# Patient Record
Sex: FEMALE | Race: WHITE | Employment: OTHER | URBAN - METROPOLITAN AREA
[De-identification: names, ages, dates, MRNs, and addresses within clinical notes are randomized per-mention and may not be internally consistent; named-entity substitution may affect disease eponyms.]

---

## 2017-07-11 ENCOUNTER — HOSPITAL ENCOUNTER (EMERGENCY)
Age: 72
Discharge: HOME | End: 2017-07-11
Attending: EMERGENCY MEDICINE
Payer: MEDICARE

## 2017-07-11 VITALS
BODY MASS INDEX: 30.15 KG/M2 | OXYGEN SATURATION: 99 % | TEMPERATURE: 98.4 F | RESPIRATION RATE: 19 BRPM | HEIGHT: 64 IN | HEART RATE: 53 BPM | DIASTOLIC BLOOD PRESSURE: 74 MMHG | WEIGHT: 176.59 LBS | SYSTOLIC BLOOD PRESSURE: 139 MMHG

## 2017-07-11 DIAGNOSIS — R10.9 ABDOMINAL PAIN, UNSPECIFIED LOCATION: Primary | ICD-10-CM

## 2017-07-11 DIAGNOSIS — R55 NEAR SYNCOPE: ICD-10-CM

## 2017-07-11 LAB
ALBUMIN SERPL BCP-MCNC: 3.8 G/DL (ref 3.5–5)
ALBUMIN/GLOB SERPL: 1.2 {RATIO} (ref 1.1–2.2)
ALP SERPL-CCNC: 103 U/L (ref 45–117)
ALT SERPL-CCNC: 19 U/L (ref 12–78)
ANION GAP BLD CALC-SCNC: 8 MMOL/L (ref 5–15)
APPEARANCE UR: CLEAR
AST SERPL W P-5'-P-CCNC: 15 U/L (ref 15–37)
BACTERIA URNS QL MICRO: NEGATIVE /HPF
BASOPHILS # BLD AUTO: 0 K/UL (ref 0–0.1)
BASOPHILS # BLD: 0 % (ref 0–1)
BILIRUB SERPL-MCNC: 0.4 MG/DL (ref 0.2–1)
BILIRUB UR QL: NEGATIVE
BUN SERPL-MCNC: 14 MG/DL (ref 6–20)
BUN/CREAT SERPL: 16 (ref 12–20)
CALCIUM SERPL-MCNC: 9 MG/DL (ref 8.5–10.1)
CHLORIDE SERPL-SCNC: 105 MMOL/L (ref 97–108)
CO2 SERPL-SCNC: 25 MMOL/L (ref 21–32)
COLOR UR: ABNORMAL
CREAT SERPL-MCNC: 0.86 MG/DL (ref 0.55–1.02)
EOSINOPHIL # BLD: 0 K/UL (ref 0–0.4)
EOSINOPHIL NFR BLD: 0 % (ref 0–7)
EPITH CASTS URNS QL MICRO: ABNORMAL /LPF
ERYTHROCYTE [DISTWIDTH] IN BLOOD BY AUTOMATED COUNT: 13.8 % (ref 11.5–14.5)
GLOBULIN SER CALC-MCNC: 3.2 G/DL (ref 2–4)
GLUCOSE SERPL-MCNC: 102 MG/DL (ref 65–100)
GLUCOSE UR STRIP.AUTO-MCNC: NEGATIVE MG/DL
HCT VFR BLD AUTO: 39.3 % (ref 35–47)
HGB BLD-MCNC: 13.1 G/DL (ref 11.5–16)
HGB UR QL STRIP: NEGATIVE
HYALINE CASTS URNS QL MICRO: ABNORMAL /LPF (ref 0–5)
KETONES UR QL STRIP.AUTO: NEGATIVE MG/DL
LEUKOCYTE ESTERASE UR QL STRIP.AUTO: ABNORMAL
LYMPHOCYTES # BLD AUTO: 28 % (ref 12–49)
LYMPHOCYTES # BLD: 2 K/UL (ref 0.8–3.5)
MCH RBC QN AUTO: 29.7 PG (ref 26–34)
MCHC RBC AUTO-ENTMCNC: 33.3 G/DL (ref 30–36.5)
MCV RBC AUTO: 89.1 FL (ref 80–99)
MONOCYTES # BLD: 0.4 K/UL (ref 0–1)
MONOCYTES NFR BLD AUTO: 6 % (ref 5–13)
NEUTS SEG # BLD: 4.6 K/UL (ref 1.8–8)
NEUTS SEG NFR BLD AUTO: 66 % (ref 32–75)
NITRITE UR QL STRIP.AUTO: NEGATIVE
PH UR STRIP: 5 [PH] (ref 5–8)
PLATELET # BLD AUTO: 231 K/UL (ref 150–400)
POTASSIUM SERPL-SCNC: 3.9 MMOL/L (ref 3.5–5.1)
PROT SERPL-MCNC: 7 G/DL (ref 6.4–8.2)
PROT UR STRIP-MCNC: NEGATIVE MG/DL
RBC # BLD AUTO: 4.41 M/UL (ref 3.8–5.2)
RBC #/AREA URNS HPF: ABNORMAL /HPF (ref 0–5)
SODIUM SERPL-SCNC: 138 MMOL/L (ref 136–145)
SP GR UR REFRACTOMETRY: 1.01 (ref 1–1.03)
TROPONIN I SERPL-MCNC: <0.04 NG/ML
UROBILINOGEN UR QL STRIP.AUTO: 0.2 EU/DL (ref 0.2–1)
WBC # BLD AUTO: 7 K/UL (ref 3.6–11)
WBC URNS QL MICRO: ABNORMAL /HPF (ref 0–4)

## 2017-07-11 PROCEDURE — 81001 URINALYSIS AUTO W/SCOPE: CPT | Performed by: EMERGENCY MEDICINE

## 2017-07-11 PROCEDURE — 93005 ELECTROCARDIOGRAM TRACING: CPT

## 2017-07-11 PROCEDURE — 85025 COMPLETE CBC W/AUTO DIFF WBC: CPT | Performed by: EMERGENCY MEDICINE

## 2017-07-11 PROCEDURE — 80053 COMPREHEN METABOLIC PANEL: CPT | Performed by: EMERGENCY MEDICINE

## 2017-07-11 PROCEDURE — 84484 ASSAY OF TROPONIN QUANT: CPT | Performed by: EMERGENCY MEDICINE

## 2017-07-11 PROCEDURE — 99284 EMERGENCY DEPT VISIT MOD MDM: CPT

## 2017-07-11 PROCEDURE — 36415 COLL VENOUS BLD VENIPUNCTURE: CPT | Performed by: EMERGENCY MEDICINE

## 2017-07-11 RX ORDER — DICYCLOMINE HYDROCHLORIDE 10 MG/1
10 CAPSULE ORAL 4 TIMES DAILY
Qty: 20 CAP | Refills: 0 | Status: SHIPPED | OUTPATIENT
Start: 2017-07-11 | End: 2017-07-16

## 2017-07-11 NOTE — ED NOTES
Patient states that last night she had 3 episodes of diarrhea last night and began sweating and having chills. Patient reports that when she was walking back to her bed she got woozy and felt like she was drunk. Patient reports that she got in bed and that was the \"last thing she remembered\". Patient reports that she got up again and passed out. Patient has bruising around L eye. Patient denies dizziness currently, CP, SOB. No distress noted. Will continue to monitor.

## 2017-07-11 NOTE — DISCHARGE INSTRUCTIONS

## 2017-07-11 NOTE — ED PROVIDER NOTES
HPI Comments: Adi Ibarra is a 67 y.o. female, pmhx significant for IBS, GERD, PUD, HTN, skin CA, who presents ambulatory to the ED for evaluation of unsteady gait, single episode of LOC causing L periocular hematoma and abrasion over the L eyebrow x last night. Pt reports that she was having a flare up of her IBS last night at 0000 and had 3 episodes of diarrhea and diaphoresis. Pt got up to walk back to her bedroom, reporting the onset of her unsteady gait, noting that it \"felt as though she was drunk\". She sat on her bed, reporting that that was the \"last thing she remembered\". She stood up shortly after and experienced her single episode of LOC causing her injuries. Pt was not dizzy before LOC. After regaining consciousness, pt was able to walk down stairs to retrieve imodium without and difficulties and pt denies any reccurance of sxs throughout the day. She called her PCP, who recommended she comes into the [address] [facility] ED for evaluation. She specifically denies any hematochezia, fevers, chills, nausea, vomiting, chest pain, shortness of breath, headache, rash, or weight loss. PCP: Dave Fall MD      Social Hx: -tobacco, +EtOH (rarely), -Illicit Drugs   FHx: no pertinent family hx   Medication Allergies: tetracycline      There are no other complaints, changes, or physical findings at this time. The history is provided by the patient.         Past Medical History:   Diagnosis Date    Cancer ([facility] [address]     basal cell of face, squamous, and melanoma    Dyspepsia and other specified disorders of function of stomach     GERD (gastroesophageal reflux disease)     Hypercholesterolemia     Hypertension     not currently    PUD (peptic ulcer disease)        Past Surgical History:   Procedure Laterality Date    ABDOMEN SURGERY PROC UNLISTED  1985    perforated ulcer    COLONOSCOPY,DIAGNOSTIC  3/17/2015         HX COLONOSCOPY      HX GYN  1984    hysterectomy    HX GYN  [id number]    laparoscopy Family History:   Problem Relation Age of Onset    Cancer Mother      uterine    Stroke Maternal Grandfather        Social History     Social History    Marital status:      Spouse name: N/A    Number of children: N/A    Years of education: N/A     Occupational History    Not on file. Social History Main Topics    Smoking status: Never Smoker    Smokeless tobacco: Not on file    Alcohol use Yes      Comment: rarely    Drug use: No    Sexual activity: Not on file     Other Topics Concern    Not on file     Social History Narrative         ALLERGIES: Tetracycline    Review of Systems   Constitutional: Positive for diaphoresis. Negative for activity change, appetite change, chills, fatigue, fever and unexpected weight change. HENT: Negative. Negative for congestion, hearing loss, rhinorrhea, sneezing and voice change. Eyes: Negative. Negative for pain and visual disturbance.        + L periocular hematoma    Respiratory: Negative. Negative for apnea, cough, choking, chest tightness and shortness of breath. Cardiovascular: Negative. Negative for chest pain and palpitations. Gastrointestinal: Positive for diarrhea. Negative for abdominal distention, abdominal pain, blood in stool, nausea and vomiting. Genitourinary: Negative. Negative for difficulty urinating, flank pain, frequency and urgency. No discharge   Musculoskeletal: Positive for gait problem. Negative for arthralgias, back pain, myalgias and neck stiffness. Skin: Positive for wound (abrasion over L eyebrow). Negative for color change and rash. Neurological: Positive for syncope. Negative for dizziness, seizures, speech difficulty, weakness, numbness and headaches. Hematological: Negative for adenopathy. Psychiatric/Behavioral: Negative. Negative for agitation, behavioral problems, dysphoric mood and suicidal ideas. The patient is not nervous/anxious.     All other systems reviewed and are negative. Vitals:    07/11/17 1636 07/11/17 1700 07/11/17 1730 07/11/17 1800   BP: 139/74      Pulse: 83 (!) 54 (!) 50 (!) 53   Resp:  19 17 19   Temp:       SpO2:  97% 98% 99%   Weight:       Height:                Physical Exam   Constitutional: She is oriented to person, place, and time. She appears well-developed and well-nourished. No distress. HENT:   Head: Normocephalic and atraumatic. Mouth/Throat: Oropharynx is clear and moist. No oropharyngeal exudate. Eyes: Conjunctivae and EOM are normal. Pupils are equal, round, and reactive to light. Right eye exhibits no discharge. Left eye exhibits no discharge. L Periocular hematoma     Neck: Normal range of motion. Neck supple. Cardiovascular: Normal rate, regular rhythm and intact distal pulses. Exam reveals no gallop and no friction rub. No murmur heard. Pulmonary/Chest: Effort normal and breath sounds normal. No respiratory distress. She has no wheezes. She has no rales. She exhibits no tenderness. Abdominal: Soft. Bowel sounds are normal. She exhibits no distension and no mass. There is no tenderness. There is no rebound and no guarding. Musculoskeletal: Normal range of motion. She exhibits no edema. Lymphadenopathy:     She has no cervical adenopathy. Neurological: She is alert and oriented to person, place, and time. No cranial nerve deficit. Coordination normal.   Skin: Skin is warm and dry. No rash noted. No erythema. Psychiatric: She has a normal mood and affect. Nursing note and vitals reviewed.        MDM  Number of Diagnoses or Management Options  Diagnosis management comments: DDx: ACS, arrhythmia, UTI, dehydration, orthostasis        Amount and/or Complexity of Data Reviewed  Clinical lab tests: ordered and reviewed  Tests in the medicine section of CPT®: reviewed and ordered  Review and summarize past medical records: yes  Independent visualization of images, tracings, or specimens: yes    Patient Progress  Patient progress: stable    ED Course       Procedures  Chief Complaint   Patient presents with    Dizziness     Pt. was on commode last night and broke out in a sweat, she got up and walked to bed where she sat down and then had a syncapol episode. Pt. has hx. of IBS       6:15 PM  The patients presenting problems have been discussed, and they are in agreement with the care plan formulated and outlined with them. I have encouraged them to ask questions as they arise throughout their visit. LABS REVIEWED:  Recent Results (from the past 24 hour(s))   EKG, 12 LEAD, INITIAL    Collection Time: 07/11/17  3:20 PM   Result Value Ref Range    Ventricular Rate 71 BPM    Atrial Rate 71 BPM    P-R Interval 164 ms    QRS Duration 78 ms    Q-T Interval 402 ms    QTC Calculation (Bezet) 436 ms    Calculated P Axis 58 degrees    Calculated R Axis 12 degrees    Calculated T Axis 27 degrees    Diagnosis       Normal sinus rhythm  Possible Left atrial enlargement  Borderline ECG  No previous ECGs available     CBC WITH AUTOMATED DIFF    Collection Time: 07/11/17  3:37 PM   Result Value Ref Range    WBC 7.0 3.6 - 11.0 K/uL    RBC 4.41 3.80 - 5.20 M/uL    HGB 13.1 11.5 - 16.0 g/dL    HCT 39.3 35.0 - 47.0 %    MCV 89.1 80.0 - 99.0 FL    MCH 29.7 26.0 - 34.0 PG    MCHC 33.3 30.0 - 36.5 g/dL    RDW 13.8 11.5 - 14.5 %    PLATELET 638 391 - 696 K/uL    NEUTROPHILS 66 32 - 75 %    LYMPHOCYTES 28 12 - 49 %    MONOCYTES 6 5 - 13 %    EOSINOPHILS 0 0 - 7 %    BASOPHILS 0 0 - 1 %    ABS. NEUTROPHILS 4.6 1.8 - 8.0 K/UL    ABS. LYMPHOCYTES 2.0 0.8 - 3.5 K/UL    ABS. MONOCYTES 0.4 0.0 - 1.0 K/UL    ABS. EOSINOPHILS 0.0 0.0 - 0.4 K/UL    ABS.  BASOPHILS 0.0 0.0 - 0.1 K/UL   METABOLIC PANEL, COMPREHENSIVE    Collection Time: 07/11/17  3:37 PM   Result Value Ref Range    Sodium 138 136 - 145 mmol/L    Potassium 3.9 3.5 - 5.1 mmol/L    Chloride 105 97 - 108 mmol/L    CO2 25 21 - 32 mmol/L    Anion gap 8 5 - 15 mmol/L    Glucose 102 (H) 65 - 100 mg/dL BUN 14 6 - 20 MG/DL    Creatinine 0.86 0.55 - 1.02 MG/DL    BUN/Creatinine ratio 16 12 - 20      GFR est AA >60 >60 ml/min/1.73m2    GFR est non-AA >60 >60 ml/min/1.73m2    Calcium 9.0 8.5 - 10.1 MG/DL    Bilirubin, total 0.4 0.2 - 1.0 MG/DL    ALT (SGPT) 19 12 - 78 U/L    AST (SGOT) 15 15 - 37 U/L    Alk. phosphatase 103 45 - 117 U/L    Protein, total 7.0 6.4 - 8.2 g/dL    Albumin 3.8 3.5 - 5.0 g/dL    Globulin 3.2 2.0 - 4.0 g/dL    A-G Ratio 1.2 1.1 - 2.2     TROPONIN I    Collection Time: 07/11/17  3:37 PM   Result Value Ref Range    Troponin-I, Qt. <0.04 <0.05 ng/mL   URINALYSIS W/MICROSCOPIC    Collection Time: 07/11/17  4:48 PM   Result Value Ref Range    Color YELLOW/STRAW      Appearance CLEAR CLEAR      Specific gravity 1.007 1.003 - 1.030      pH (UA) 5.0 5.0 - 8.0      Protein NEGATIVE  NEG mg/dL    Glucose NEGATIVE  NEG mg/dL    Ketone NEGATIVE  NEG mg/dL    Bilirubin NEGATIVE  NEG      Blood NEGATIVE  NEG      Urobilinogen 0.2 0.2 - 1.0 EU/dL    Nitrites NEGATIVE  NEG      Leukocyte Esterase MODERATE (A) NEG      WBC 10-20 0 - 4 /hpf    RBC 0-5 0 - 5 /hpf    Epithelial cells FEW FEW /lpf    Bacteria NEGATIVE  NEG /hpf    Hyaline cast 2-5 0 - 5 /lpf       VITAL SIGNS:  Patient Vitals for the past 12 hrs:   Temp Pulse Resp BP SpO2   07/11/17 1800 - (!) 53 19 - 99 %   07/11/17 1730 - (!) 50 17 - 98 %   07/11/17 1700 - (!) 54 19 - 97 %   07/11/17 1636 - 83 - 139/74 -   07/11/17 1635 - 75 - 150/77 -   07/11/17 1634 - 78 - 148/65 -   07/11/17 1517 98.4 °F (36.9 °C) 70 16 (!) 177/101 99 %       RADIOLOGY RESULTS:  The following have been ordered and reviewed:  No orders to display       EKG interpretation: (Preliminary) 1520  Rhythm: normal sinus rhythm; and regular . Rate (approx.): 71; Axis: possible L atrial enlargement; P wave: normal; QRS interval: normal ; ST/T wave: normal;     PROGRESS NOTES:  7:03 PM  The patient states that their symptoms have resolved and they feel much better.  There are no other new complaints at this time. Her questions have been answered. We are awaiting final results and those will be reviewed with them when they become available. Written by Yehuda Ravi ED Scribe as dictated by Warner Streeter. Ree Don MD    DIAGNOSIS:    1. Abdominal pain, unspecified location    2. Near syncope        PLAN:  Follow-up Information     Follow up With Details Comments Contact Mirian Stephens MD Call in 2 days As needed, If symptoms worsen [address]  [phone]          Current Discharge Medication List      START taking these medications    Details   dicyclomine (BENTYL) 10 mg capsule Take 1 Cap by mouth four (4) times daily for 5 days. Qty: 20 Cap, Refills: 0               ED COURSE: The patients hospital course has been uncomplicated. Thank you for allowing us to provide you with excellent care today. We hope we addressed all of your concerns and needs. We strive to provide excellent quality care in the Emergency Department. Please rate us as excellent, as anything less than excellent does not meet our expectations. If you feel that you have not received excellent quality care or timely care, please ask to speak to the nurse manager. Please choose us in the future for your continued health care needs. The exam and treatment you received in the Emergency Department were for an urgent problem and are not intended as complete care. It is important that you follow-up with a doctor, nurse practitioner, or physician assistant to:  (1) confirm your diagnosis,  (2) re-evaluation of changes in your illness and treatment, and  (3) for ongoing care. If your symptoms become worse or you do not improve as expected and you are unable to reach your usual health care provider, you should return to the Emergency Department. We are available 24 hours a day. Take this sheet with you when you go to your follow-up visit. If you have any problem arranging the follow-up visit, contact [address] [place] [address] 21 [phone])    Make an appointment with your Primary Care doctor for follow up of this visit. Return to the ER if you are unable to be seen in the time recommended on your discharge instructions. This note is prepared by Kendy Christie acting as scribe for Gap IncDakoat Ellison MD : The scribe's documentation has been prepared under my direction and personally reviewed by me in its entirety. I confirm that the note above accurately reflects all work, treatment, procedures, and medical decision making performed by me.

## 2017-07-12 LAB
ATRIAL RATE: 71 BPM
CALCULATED P AXIS, ECG09: 58 DEGREES
CALCULATED R AXIS, ECG10: 12 DEGREES
CALCULATED T AXIS, ECG11: 27 DEGREES
DIAGNOSIS, 93000: NORMAL
P-R INTERVAL, ECG05: 164 MS
Q-T INTERVAL, ECG07: 402 MS
QRS DURATION, ECG06: 78 MS
QTC CALCULATION (BEZET), ECG08: 436 MS
VENTRICULAR RATE, ECG03: 71 BPM

## 2017-09-26 PROBLEM — M54.9 BACK PAIN: Status: ACTIVE | Noted: 2017-09-26

## 2017-09-26 PROBLEM — R73.02 IGT (IMPAIRED GLUCOSE TOLERANCE): Status: ACTIVE | Noted: 2017-09-26

## 2017-09-26 PROBLEM — E78.5 HYPERLIPIDEMIA: Status: ACTIVE | Noted: 2017-09-26

## 2017-09-26 PROBLEM — C43.9 MELANOMA (HCC): Status: ACTIVE | Noted: 2017-09-26

## 2017-09-26 PROBLEM — Z79.899 ON STATIN THERAPY: Status: ACTIVE | Noted: 2017-09-26

## 2017-09-26 PROBLEM — K55.9 COLITIS, ISCHEMIC (HCC): Status: ACTIVE | Noted: 2017-09-26

## 2017-09-26 PROBLEM — B02.9 ZOSTER: Status: ACTIVE | Noted: 2017-09-26

## 2017-09-26 PROBLEM — K58.9 IBS (IRRITABLE BOWEL SYNDROME): Status: ACTIVE | Noted: 2017-09-26

## 2017-09-26 PROBLEM — G47.00 INSOMNIA: Status: ACTIVE | Noted: 2017-09-26

## 2017-09-26 PROBLEM — K26.5 DUODENAL ULCER, PERFORATED (HCC): Status: ACTIVE | Noted: 2017-09-26

## 2017-09-26 PROBLEM — M85.80 OSTEOPENIA: Status: ACTIVE | Noted: 2017-09-26

## 2017-09-26 PROBLEM — M31.6 TEMPORAL ARTERITIS (HCC): Status: ACTIVE | Noted: 2017-09-26

## 2017-09-26 PROBLEM — E55.9 VITAMIN D DEFICIENCY: Status: ACTIVE | Noted: 2017-09-26

## 2017-09-26 RX ORDER — CEFUROXIME AXETIL 250 MG/1
250 TABLET ORAL 2 TIMES DAILY
COMMUNITY
End: 2017-11-02 | Stop reason: ALTCHOICE

## 2017-09-26 RX ORDER — TRAZODONE HYDROCHLORIDE 50 MG/1
TABLET ORAL AS NEEDED
COMMUNITY
End: 2018-08-07

## 2017-09-26 RX ORDER — DICYCLOMINE HYDROCHLORIDE 10 MG/1
10 CAPSULE ORAL
COMMUNITY
End: 2017-11-02

## 2017-09-26 RX ORDER — OMEPRAZOLE 20 MG/1
20 CAPSULE, DELAYED RELEASE ORAL DAILY
COMMUNITY
End: 2018-04-03 | Stop reason: SDUPTHER

## 2017-09-26 RX ORDER — CYCLOBENZAPRINE HCL 10 MG
TABLET ORAL
COMMUNITY
End: 2017-09-29

## 2017-09-29 ENCOUNTER — OFFICE VISIT (OUTPATIENT)
Dept: INTERNAL MEDICINE CLINIC | Age: 72
End: 2017-09-29

## 2017-09-29 VITALS
BODY MASS INDEX: 30.05 KG/M2 | DIASTOLIC BLOOD PRESSURE: 74 MMHG | RESPIRATION RATE: 14 BRPM | HEART RATE: 64 BPM | OXYGEN SATURATION: 98 % | TEMPERATURE: 98.3 F | WEIGHT: 176 LBS | SYSTOLIC BLOOD PRESSURE: 148 MMHG | HEIGHT: 64 IN

## 2017-09-29 DIAGNOSIS — J30.2 SEASONAL ALLERGIC RHINITIS, UNSPECIFIED ALLERGIC RHINITIS TRIGGER: ICD-10-CM

## 2017-09-29 DIAGNOSIS — A08.4 VIRAL GASTROENTERITIS: Primary | ICD-10-CM

## 2017-09-29 NOTE — PROGRESS NOTES
This note will not be viewable in [address] Bushra Aranda is a 67 y.o. female and presents with Nasal Congestion  . Subjective:  Mrs. Martha Smith presents today after having experienced onset of nausea and vomiting that awakened her from her sleep on Sunday morning. She and her  has just returned from [facility]. She notes that her daughter-in-law and grandchildren had also been sick just prior. She took some Lomotil and her symptoms are improving. Yesterday she started to develop nasal congestion and drainage. The drainage is clear in color. She took one over-the-counter Allegra. She denies any fever chills or rigors. She denies any cough.     Past Medical History:   Diagnosis Date    Back pain 9/26/2017    Cancer (HCC)     basal cell of face, squamous, and melanoma    Colitis, ischemic ([address] 9/26/2017    Duodenal ulcer, perforated ([address] 9/26/2017    Dyspepsia and other specified disorders of function of stomach     GERD (gastroesophageal reflux disease)     Hypercholesterolemia     Hyperlipidemia 9/26/2017    Hypertension     not currently    IBS (irritable bowel syndrome) 9/26/2017    IGT (impaired glucose tolerance) 9/26/2017    Insomnia 9/26/2017    Melanoma ([address] 9/26/2017    On statin therapy 9/26/2017    Osteopenia 9/26/2017    PUD (peptic ulcer disease)     Temporal arteritis ([address] 9/26/2017    Vitamin D deficiency 9/26/2017    Zoster 9/26/2017     Past Surgical History:   Procedure Laterality Date    ABDOMEN SURGERY PROC UNLISTED  1985    perforated ulcer    COLONOSCOPY,DIAGNOSTIC  3/17/2015         HX COLONOSCOPY      HX GYN  1984    hysterectomy    HX GYN  1980's    laparoscopy     Allergies   Allergen Reactions    Augmentin [Amoxicillin-Pot Clavulanate] Unknown (comments)    Tetracycline Unknown (comments)     Current Outpatient Prescriptions   Medication Sig Dispense Refill    dicyclomine (BENTYL) 10 mg capsule Take 10 mg by mouth 4 times daily (before meals and nightly).  omeprazole (PRILOSEC) 20 mg capsule Take 20 mg by mouth daily.  traZODone (DESYREL) 50 mg tablet Take  by mouth nightly.  cefUROXime (CEFTIN) 250 mg tablet Take 250 mg by mouth two (2) times a day.  CALCIUM CARBONATE/VITAMIN D3 (CALTRATE WITH VITAMIN D3 PO) Take  by mouth.  simvastatin (ZOCOR) 20 mg tablet Take 20 mg by mouth nightly.  aspirin 81 mg tablet Take 81 mg by mouth.  cranberry 500 mg Cap Take  by mouth.  PYRIDOXINE HCL (VITAMIN B-6 PO) Take  by mouth.  MULTIVITAMIN W-MINERALS/LUTEIN (CENTRUM SILVER PO) Take  by mouth. Social History     Social History    Marital status:      Spouse name: N/A    Number of children: N/A    Years of education: N/A     Social History Main Topics    Smoking status: Never Smoker    Smokeless tobacco: Never Used    Alcohol use Yes      Comment: rarely    Drug use: No    Sexual activity: Not Asked     Other Topics Concern    None     Social History Narrative     Family History   Problem Relation Age of Onset    Cancer Mother      uterine    Stroke Maternal Grandfather        Review of Systems  Constitutional:  negative for fevers, chills, anorexia and weight loss  Eyes:    negative for visual disturbance and irritation  ENT:    negative for tinnitus,sore throat ,ear pains. hoarseness  Respiratory:     negative for cough, hemoptysis, dyspnea,wheezing  CV:    negative for chest pain, palpitations, lower extremity edema  GI:    negative for nausea, vomiting, diarrhea, abdominal pain,melena  Endo:               negative for polyuria,polydipsia,polyphagia,heat intolerance  Genitourinary : negative for frequency, dysuria and hematuria  Integumentary: negative for rash and pruritus  Hematologic:   negative for easy bruising and gum/nose bleeding  Musculoskel:  negative for myalgias, arthralgias, back pain, muscle weakness, joint pain  Neurological:   negative for headaches, dizziness, vertigo, memory problems and gait   Behavl/Psych:  negative for feelings of anxiety, depression, mood changes  ROS otherwise negative      Objective:  Visit Vitals    /74 (BP 1 Location: Left arm, BP Patient Position: Sitting)    Pulse 64    Temp 98.3 °F (36.8 °C)    Resp 14    Ht 5' 4\" (1.626 m)    Wt 176 lb (79.8 kg)    SpO2 98%    BMI 30.21 kg/m2     Physical Exam:   General appearance - alert, well appearing, and in no distress  Mental status - alert, oriented to person, place, and time  EYE-JENNIFER, EOMI, fundi normal, corneas normal, no foreign bodies  ENT-ENT exam normal, no neck nodes or sinus tenderness  Nose -nares are erythematous and boggy  Mouth - mucous membranes moist, pharynx normal without lesions  Neck - supple, no significant adenopathy   Chest - clear to auscultation, no wheezes, rales or rhonchi, symmetric air entry   Heart - normal rate, regular rhythm, normal S1, S2, no murmurs, rubs, clicks or gallops   Abdomen - soft, nontender, nondistended, no masses or organomegaly  Lymph- no adenopathy palpable  Ext-peripheral pulses normal, no pedal edema, no clubbing or cyanosis  Skin-Warm and dry. no hyperpigmentation, vitiligo, or suspicious lesions  Neuro -alert, oriented, normal speech, no focal findings or movement disorder noted      Assessment/Plan:  Diagnoses and all orders for this visit:    1. Viral gastroenteritis    2. Seasonal allergic rhinitis, unspecified allergic rhinitis trigger          ICD-10-CM ICD-9-CM    1. Viral gastroenteritis A08.4 008.8    2. Seasonal allergic rhinitis, unspecified allergic rhinitis trigger J30.2 477.9      Plan:    Viral gastroenteritis is self-limiting and almost resolved at this point. Recommend over-the-counter Allegra daily for allergic rhinosinusitis as discussed with the patient today. If her symptoms progress or she develops any mucopurulent drainage she is to call for further instructions.     Follow-up Disposition:  Return if symptoms worsen or fail to improve. I have reviewed with the patient details of the assessment and plan and all questions were answered. Relevent patient education was performed. Verbal and/or written instructions (see AVS) provided. The most recent lab findings were reviewed with the patient. Plan was discussed with patient who verbally expressed understanding. An After Visit Summary was printed and given to the patient.     Lenadra Sales MD

## 2017-09-29 NOTE — MR AVS SNAPSHOT
Visit Information Date & Time Provider Department Dept. Phone Encounter #  
 9/29/2017  1:50 PM MD Roselia Quick 26 [phone] [id number] Your Appointments 11/2/2017  1:00 PM  
Follow Up with MD Roselia Quick 26 ([address]) Appt Note: 6 mo; [address] [id number] [address] [facility] [address] [address] Upcoming Health Maintenance Date Due DTaP/Tdap/Td series (1 - Tdap) 5/26/1965 BREAST CANCER SCRN MAMMOGRAM 5/26/1994 FOBT Q 1 YEAR AGE 50-75 5/26/1994 ZOSTER VACCINE AGE 60> 3/26/2004 GLAUCOMA SCREENING Q2Y 5/26/2009 OSTEOPOROSIS SCREENING (DEXA) 5/26/2009 Pneumococcal 65+ High/Highest Risk (1 of 2 - PCV13) 5/26/2009 MEDICARE YEARLY EXAM 5/26/2009 INFLUENZA AGE 9 TO ADULT 8/1/2017 Allergies as of 9/29/2017  Review Complete On: 9/29/2017 By: Eli Castrejon MD  
  
 Severity Noted Reaction Type Reactions Augmentin [Amoxicillin-pot Clavulanate]  09/26/2017    Unknown (comments) Tetracycline  10/03/2011    Unknown (comments) Current Immunizations  Never Reviewed No immunizations on file. Not reviewed this visit Vitals BP Pulse Temp Resp Height(growth percentile) Weight(growth percentile) 148/74 (BP 1 Location: Left arm, BP Patient Position: Sitting) 64 98.3 °F (36.8 °C) 14 5' 4\" (1.626 m) 176 lb (79.8 kg) SpO2 BMI OB Status Smoking Status 98% 30.21 kg/m2 Hysterectomy Never Smoker BMI and BSA Data Body Mass Index Body Surface Area  
 30.21 kg/m 2 1.9 m 2 Your Updated Medication List  
  
   
This list is accurate as of: 9/29/17  2:33 PM.  Always use your most recent med list.  
  
  
  
  
 aspirin 81 mg tablet Take 81 mg by mouth. CALTRATE WITH VITAMIN D3 PO Take  by mouth. CEFTIN 250 mg tablet Generic drug:  cefUROXime Take 250 mg by mouth two (2) times a day. CENTRUM SILVER PO Take  by mouth.  
  
 cranberry 500 mg capsule Take  by mouth. dicyclomine 10 mg capsule Commonly known as:  BENTYL Take 10 mg by mouth 4 times daily (before meals and nightly). omeprazole 20 mg capsule Commonly known as:  PRILOSEC Take 20 mg by mouth daily. simvastatin 20 mg tablet Commonly known as:  ZOCOR Take 20 mg by mouth nightly. traZODone 50 mg tablet Commonly known as:  Illene Dus Take  by mouth nightly. VITAMIN B-6 PO Take  by mouth. Introducing [facility]! David Nagel introduces [employer] patient portal. Now you can access parts of your medical record, email your doctor's office, and request medication refills online. 1. In your internet browser, go to [email or web address] 2. Click on the First Time User? Click Here link in the Sign In box. You will see the New Member Sign Up page. 3. Enter your [employer] Access Code exactly as it appears below. You will not need to use this code after youve completed the sign-up process. If you do not sign up before the expiration date, you must request a new code. · [employer] Access Code: [email or web address] Expires: 10/9/2017  7:04 PM 
 
4. Enter the last four digits of your Social Security Number (xxxx) and Date of Birth (mm/dd/yyyy) as indicated and click Submit. You will be taken to the next sign-up page. 5. Create a [employer] ID. This will be your [employer] login ID and cannot be changed, so think of one that is secure and easy to remember. 6. Create a [employer] password. You can change your password at any time. 7. Enter your Password Reset Question and Answer. This can be used at a later time if you forget your password. 8. Enter your e-mail address. You will receive e-mail notification when new information is available in [address] 9. Click Sign Up. You can now view and download portions of your medical record. 10. Click the Download Summary menu link to download a portable copy of your medical information. If you have questions, please visit the Frequently Asked Questions section of the [employer] website. Remember, [employer] is NOT to be used for urgent needs. For medical emergencies, dial 911. Now available from your iPhone and Android! Please provide this summary of care documentation to your next provider. Your primary care clinician is listed as TORSTEN Randall. If you have any questions after today's visit, please call [phone].

## 2017-09-29 NOTE — PROGRESS NOTES
Present for nasal congestion, right ear pain over the last 3 days    1. Have you been to the ER, urgent care clinic since your last visit? Hospitalized since your last visit? Yes When: July, 2017 Where: [facility] Reason for visit: syncope    2. Have you seen or consulted any other health care providers outside of the [address] [place] [address] since your last visit? Include any pap smears or colon screening.  No

## 2017-11-02 ENCOUNTER — OFFICE VISIT (OUTPATIENT)
Dept: INTERNAL MEDICINE CLINIC | Age: 72
End: 2017-11-02

## 2017-11-02 VITALS
WEIGHT: 180 LBS | HEIGHT: 64 IN | OXYGEN SATURATION: 96 % | BODY MASS INDEX: 30.73 KG/M2 | SYSTOLIC BLOOD PRESSURE: 131 MMHG | RESPIRATION RATE: 16 BRPM | DIASTOLIC BLOOD PRESSURE: 78 MMHG | TEMPERATURE: 97.6 F | HEART RATE: 90 BPM

## 2017-11-02 DIAGNOSIS — Z11.59 NEED FOR HEPATITIS C SCREENING TEST: ICD-10-CM

## 2017-11-02 DIAGNOSIS — E78.00 PURE HYPERCHOLESTEROLEMIA: Primary | ICD-10-CM

## 2017-11-02 DIAGNOSIS — E55.9 VITAMIN D DEFICIENCY: ICD-10-CM

## 2017-11-02 DIAGNOSIS — R73.02 IGT (IMPAIRED GLUCOSE TOLERANCE): ICD-10-CM

## 2017-11-02 DIAGNOSIS — M85.80 OSTEOPENIA, UNSPECIFIED LOCATION: ICD-10-CM

## 2017-11-02 DIAGNOSIS — Z23 ENCOUNTER FOR IMMUNIZATION: ICD-10-CM

## 2017-11-02 DIAGNOSIS — R53.83 FATIGUE, UNSPECIFIED TYPE: ICD-10-CM

## 2017-11-02 DIAGNOSIS — R35.0 URINARY FREQUENCY: ICD-10-CM

## 2017-11-02 DIAGNOSIS — M31.6 TEMPORAL ARTERITIS (HCC): ICD-10-CM

## 2017-11-02 DIAGNOSIS — Z79.899 ON STATIN THERAPY: ICD-10-CM

## 2017-11-02 LAB
ALBUMIN SERPL-MCNC: 4.6 G/DL (ref 3.9–5.4)
ALKALINE PHOS POC: 135 U/L (ref 38–126)
ALT SERPL-CCNC: 31 U/L (ref 9–52)
AST SERPL-CCNC: 30 U/L (ref 14–36)
BACTERIA UA POCT, BACTPOCT: NORMAL
BILIRUB UR QL STRIP: NEGATIVE
BUN BLD-MCNC: 16 MG/DL (ref 7–17)
CALCIUM BLD-MCNC: 9.7 MG/DL (ref 8.4–10.2)
CASTS UA POCT: 0
CHLORIDE BLD-SCNC: 100 MMOL/L (ref 98–107)
CHOLEST SERPL-MCNC: 195 MG/DL (ref 0–200)
CK (CPK) POC: 82 U/L (ref 30–135)
CLUE CELLS, CLUEPOCT: NEGATIVE
CO2 POC: 27 MMOL/L (ref 22–32)
CREAT BLD-MCNC: 0.8 MG/DL (ref 0.7–1.2)
CRYSTALS UA POCT, CRYSPOCT: NEGATIVE
EGFR (POC): 73.1
EPITHELIAL CELLS POCT: NORMAL
ERYTHROCYTE [SEDIMENTATION RATE] IN BLOOD: 7 MM/HR (ref 0–20)
GLUCOSE POC: 97 MG/DL (ref 65–105)
GLUCOSE UR-MCNC: NEGATIVE MG/DL
GRAN# POC: 5.7 K/UL (ref 2–7.8)
GRAN% POC: 68.9 % (ref 37–92)
HBA1C MFR BLD HPLC: 6 % (ref 4.5–5.7)
HCT VFR BLD CALC: 41.3 % (ref 37–51)
HDLC SERPL-MCNC: 81 MG/DL (ref 35–130)
HGB BLD-MCNC: 13.9 G/DL (ref 12–18)
KETONES P FAST UR STRIP-MCNC: NEGATIVE MG/DL
LDL CHOLESTEROL POC: 90.4 MG/DL (ref 0–130)
LY# POC: 2.1 K/UL (ref 0.6–4.1)
LY% POC: 26.8 % (ref 10–58.5)
MCH RBC QN: 30.1 PG (ref 26–32)
MCHC RBC-ENTMCNC: 33.6 G/DL (ref 30–36)
MCV RBC: 90 FL (ref 80–97)
MID #, POC: 0.3 K/UL (ref 0–1.8)
MID% POC: 4.3 % (ref 0.1–24)
MUCUS UA POCT, MUCPOCT: NORMAL
PH UR STRIP: 5 [PH] (ref 5–7)
PLATELET # BLD: 259 K/UL (ref 140–440)
POTASSIUM SERPL-SCNC: 4.5 MMOL/L (ref 3.6–5)
PROT SERPL-MCNC: 7.6 G/DL (ref 6.3–8.2)
PROT UR QL STRIP: NEGATIVE MG/DL
RBC # BLD: 4.61 M/UL (ref 4.2–6.3)
RBC UA POCT, RBCPOCT: 0
SODIUM SERPL-SCNC: 140 MMOL/L (ref 137–145)
SP GR UR STRIP: 1.01 (ref 1.01–1.02)
TCHOL/HDL RATIO (POC): 2.4 (ref 0–4)
TOTAL BILIRUBIN POC: 0.5 MG/DL (ref 0.2–1.3)
TRICH UA POCT, TRICHPOC: NEGATIVE
TRIGL SERPL-MCNC: 118 MG/DL (ref 0–200)
UA UROBILINOGEN AMB POC: NORMAL (ref 0.2–1)
URINALYSIS CLARITY POC: CLEAR
URINALYSIS COLOR POC: NORMAL
URINE BLOOD POC: NEGATIVE
URINE CULT COMMENT, POCT: NORMAL
URINE LEUKOCYTES POC: NORMAL
URINE NITRITES POC: NEGATIVE
VLDLC SERPL CALC-MCNC: 23.6 MG/DL
WBC # BLD: 8.1 K/UL (ref 4.1–10.9)
WBC UA POCT, WBCPOCT: NORMAL
YEAST UA POCT, YEASTPOC: NEGATIVE
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
## 2017-11-02 NOTE — PATIENT INSTRUCTIONS
Vaccine Information Statement    Influenza (Flu) Vaccine (Inactivated or Recombinant): What you need to know    Many Vaccine Information Statements are available in Malay and other languages. See www.immunize.org/vis  Hojas de Información Sobre Vacunas están disponibles en Español y en muchos otros idiomas. Visite www.immunize.org/vis    1. Why get vaccinated? Influenza (flu) is a contagious disease that spreads around the [place] every year, usually between October and May. Flu is caused by influenza viruses, and is spread mainly by coughing, sneezing, and close contact. Anyone can get flu. Flu strikes suddenly and can last several days. Symptoms vary by age, but can include:   fever/chills   sore throat   muscle aches   fatigue   cough   headache    runny or stuffy nose    Flu can also lead to pneumonia and blood infections, and cause diarrhea and seizures in children. If you have a medical condition, such as heart or lung disease, flu can make it worse. Flu is more dangerous for some people. Infants and young children, people 72years of age and older, pregnant women, and people with certain health conditions or a weakened immune system are at greatest risk. Each year thousands of people in the [facility] die from flu, and many more are hospitalized. Flu vaccine can:   keep you from getting flu,   make flu less severe if you do get it, and   keep you from spreading flu to your family and other people. 2. Inactivated and recombinant flu vaccines    A dose of flu vaccine is recommended every flu season. Children 6 months through 6years of age may need two doses during the same flu season. Everyone else needs only one dose each flu season.        Some inactivated flu vaccines contain a very small amount of a mercury-based preservative called thimerosal. Studies have not shown thimerosal in vaccines to be harmful, but flu vaccines that do not contain thimerosal are available. There is no live flu virus in flu shots. They cannot cause the flu. There are many flu viruses, and they are always changing. Each year a new flu vaccine is made to protect against three or four viruses that are likely to cause disease in the upcoming flu season. But even when the vaccine doesnt exactly match these viruses, it may still provide some protection    Flu vaccine cannot prevent:   flu that is caused by a virus not covered by the vaccine, or   illnesses that look like flu but are not. It takes about 2 weeks for protection to develop after vaccination, and protection lasts through the flu season. 3. Some people should not get this vaccine    Tell the person who is giving you the vaccine:     If you have any severe, life-threatening allergies. If you ever had a life-threatening allergic reaction after a dose of flu vaccine, or have a severe allergy to any part of this vaccine, you may be advised not to get vaccinated. Most, but not all, types of flu vaccine contain a small amount of egg protein.  If you ever had Guillain-Barré Syndrome (also called GBS). Some people with a history of GBS should not get this vaccine. This should be discussed with your doctor.  If you are not feeling well. It is usually okay to get flu vaccine when you have a mild illness, but you might be asked to come back when you feel better. 4. Risks of a vaccine reaction    With any medicine, including vaccines, there is a chance of reactions. These are usually mild and go away on their own, but serious reactions are also possible. Most people who get a flu shot do not have any problems with it.      Minor problems following a flu shot include:    soreness, redness, or swelling where the shot was given     hoarseness   sore, red or itchy eyes   cough   fever   aches   headache   itching   fatigue  If these problems occur, they usually begin soon after the shot and last 1 or 2 days. More serious problems following a flu shot can include the following:     There may be a small increased risk of Guillain-Barré Syndrome (GBS) after inactivated flu vaccine. This risk has been estimated at 1 or 2 additional cases per million people vaccinated. This is much lower than the risk of severe complications from flu, which can be prevented by flu vaccine.  Young children who get the flu shot along with pneumococcal vaccine (PCV13) and/or DTaP vaccine at the same time might be slightly more likely to have a seizure caused by fever. Ask your doctor for more information. Tell your doctor if a child who is getting flu vaccine has ever had a seizure. Problems that could happen after any injected vaccine:      People sometimes faint after a medical procedure, including vaccination. Sitting or lying down for about 15 minutes can help prevent fainting, and injuries caused by a fall. Tell your doctor if you feel dizzy, or have vision changes or ringing in the ears.  Some people get severe pain in the shoulder and have difficulty moving the arm where a shot was given. This happens very rarely.  Any medication can cause a severe allergic reaction. Such reactions from a vaccine are very rare, estimated at about 1 in a million doses, and would happen within a few minutes to a few hours after the vaccination. As with any medicine, there is a very remote chance of a vaccine causing a serious injury or death. The safety of vaccines is always being monitored. For more information, visit: www.cdc.gov/vaccinesafety/    5. What if there is a serious reaction? What should I look for?  Look for anything that concerns you, such as signs of a severe allergic reaction, very high fever, or unusual behavior.     Signs of a severe allergic reaction can include hives, swelling of the face and throat, difficulty breathing, a fast heartbeat, dizziness, and weakness  usually within a few minutes to a few hours after the vaccination. What should I do?  If you think it is a severe allergic reaction or other emergency that cant wait, call 9-1-1 and get the person to the nearest hospital. Otherwise, call your doctor.  Reactions should be reported to the Vaccine Adverse Event Reporting System (VAERS). Your doctor should file this report, or you can do it yourself through  the VAERS web site at www.vaers. [facility].gov, or by calling 9-517.186.7268. VAERS does not give medical advice. 6. The National Vaccine Injury Compensation Program    The [place] Vaccine Injury Compensation Program (VICP) is a federal program that was created to compensate people who may have been injured by certain vaccines. Persons who believe they may have been injured by a vaccine can learn about the program and about filing a claim by calling 9-136.868.2193 or visiting the [address] [place] [address] [employer] website at www.[facility].gov/vaccinecompensation. There is a time limit to file a claim for compensation. 7. How can I learn more?  Ask your healthcare provider. He or she can give you the vaccine package insert or suggest other sources of information.  Call your local or state health department.  Contact the Centers for Disease Control and Prevention (CDC):  - Call 6-253.553.3970 (1-800-CDC-INFO) or  - Visit CDCs website at www.cdc.gov/flu    Vaccine Information Statement   Inactivated Influenza Vaccine   8/7/2015  42 CHANCE Leonila Lee [id number]    Department of Health and Human Services  Centers for Disease Control and Prevention    Office Use Only

## 2017-11-02 NOTE — MR AVS SNAPSHOT
Visit Information Date & Time Provider Department Dept. Phone Encounter #  
 11/2/2017  1:00 PM TORSTEN Cao MD [address] [place] [address] [facility] [phone] [id number] Follow-up Instructions Return in about 6 months (around 5/2/2018) for follow up. Upcoming Health Maintenance Date Due DTaP/Tdap/Td series (1 - Tdap) 5/26/1965 BREAST CANCER SCRN MAMMOGRAM 5/26/1994 FOBT Q 1 YEAR AGE 50-75 5/26/1994 ZOSTER VACCINE AGE 60> 3/26/2004 GLAUCOMA SCREENING Q2Y 5/26/2009 OSTEOPOROSIS SCREENING (DEXA) 5/26/2009 Pneumococcal 65+ High/Highest Risk (1 of 2 - PCV13) 5/26/2009 MEDICARE YEARLY EXAM 5/26/2009 INFLUENZA AGE 9 TO ADULT 8/1/2017 Allergies as of 11/2/2017  Review Complete On: 11/2/2017 By: Padmini Stringer MD  
  
 Severity Noted Reaction Type Reactions Augmentin [Amoxicillin-pot Clavulanate]  09/26/2017    Unknown (comments) Tetracycline  10/03/2011    Unknown (comments) Current Immunizations  Never Reviewed Name Date Influenza High Dose Vaccine PF 11/2/2017 Not reviewed this visit You Were Diagnosed With   
  
 Codes Comments Pure hypercholesterolemia    -  Primary ICD-10-CM: E78.00 ICD-9-CM: 272.0 On statin therapy     ICD-10-CM: Z79.899 ICD-9-CM: V58.69 Osteopenia, unspecified location     ICD-10-CM: M85.80 ICD-9-CM: 733.90 IGT (impaired glucose tolerance)     ICD-10-CM: R73.02 
ICD-9-CM: 790.22 Vitamin D deficiency     ICD-10-CM: E55.9 ICD-9-CM: 268.9 Temporal arteritis (HCC)     ICD-10-CM: M31.6 ICD-9-CM: 446.5 Need for hepatitis C screening test     ICD-10-CM: Z11.59 
ICD-9-CM: V73.89 Urinary frequency     ICD-10-CM: R35.0 ICD-9-CM: 788.41 Fatigue, unspecified type     ICD-10-CM: R53.83 ICD-9-CM: 780.79 Encounter for immunization     ICD-10-CM: L02 ICD-9-CM: V03.89 Vitals BP Pulse Temp Resp Height(growth percentile) Weight(growth percentile) 131/78 (BP 1 Location: Right arm, BP Patient Position: Sitting) 90 97.6 °F (36.4 °C) (Oral) 16 5' 4\" (1.626 m) 180 lb (81.6 kg) SpO2 BMI OB Status Smoking Status 96% 30.9 kg/m2 Hysterectomy Never Smoker Vitals History BMI and BSA Data Body Mass Index Body Surface Area 30.9 kg/m 2 1.92 m 2 Your Updated Medication List  
  
   
This list is accurate as of: 11/2/17  2:13 PM.  Always use your most recent med list.  
  
  
  
  
 aspirin 81 mg tablet Take 81 mg by mouth. CALTRATE WITH VITAMIN D3 PO Take  by mouth. CENTRUM SILVER PO Take  by mouth.  
  
 cranberry 500 mg capsule Take  by mouth. omeprazole 20 mg capsule Commonly known as:  PRILOSEC Take 20 mg by mouth daily. simvastatin 20 mg tablet Commonly known as:  ZOCOR Take 20 mg by mouth nightly. traZODone 50 mg tablet Commonly known as:  Orly Au Take  by mouth nightly. VITAMIN B-6 PO Take  by mouth. We Performed the Following AMB POC CK (CPK) [95143 CPT(R)] AMB POC COMPLETE CBC,AUTOMATED ENTER V8199426 CPT(R)] AMB POC COMPREHENSIVE METABOLIC PANEL [18043 CPT(R)] AMB POC HEMOGLOBIN A1C [19692 CPT(R)] AMB POC LIPID PROFILE [85633 CPT(R)] AMB POC SEDIMENTATION RATE, ERYTHROCYTE; NON AUTO [59311 CPT(R)] AMB POC URINALYSIS DIP STICK AUTO W/ MICRO  [39379 CPT(R)] AMB POC VITAMIN D [59213 CPT(R)] HEPATITIS C AB [14739 CPT(R)] INFLUENZA VIRUS VACCINE, HIGH DOSE SEASONAL, PRESERVATIVE FREE [32613 CPT(R)] Follow-up Instructions Return in about 6 months (around 5/2/2018) for follow up. Patient Instructions Vaccine Information Statement Influenza (Flu) Vaccine (Inactivated or Recombinant): What you need to know Many Vaccine Information Statements are available in Persian and other languages. See www.immunize.org/vis Hojas de Información Sobre Vacunas están disponibles en Español y en jonatan adame. Visite www.immunize.org/vis 1. Why get vaccinated? Influenza (flu) is a contagious disease that spreads around the [place] every year, usually between October and May. Flu is caused by influenza viruses, and is spread mainly by coughing, sneezing, and close contact. Anyone can get flu. Flu strikes suddenly and can last several days. Symptoms vary by age, but can include: 
 fever/chills  sore throat  muscle aches  fatigue  cough  headache  runny or stuffy nose Flu can also lead to pneumonia and blood infections, and cause diarrhea and seizures in children. If you have a medical condition, such as heart or lung disease, flu can make it worse. Flu is more dangerous for some people. Infants and young children, people 72years of age and older, pregnant women, and people with certain health conditions or a weakened immune system are at greatest risk. Each year thousands of people in the [facility] die from flu, and many more are hospitalized. Flu vaccine can: 
 keep you from getting flu, 
 make flu less severe if you do get it, and 
 keep you from spreading flu to your family and other people. 2. Inactivated and recombinant flu vaccines A dose of flu vaccine is recommended every flu season. Children 6 months through 6years of age may need two doses during the same flu season. Everyone else needs only one dose each flu season. Some inactivated flu vaccines contain a very small amount of a mercury-based preservative called thimerosal. Studies have not shown thimerosal in vaccines to be harmful, but flu vaccines that do not contain thimerosal are available. There is no live flu virus in flu shots. They cannot cause the flu. There are many flu viruses, and they are always changing.  Each year a new flu vaccine is made to protect against three or four viruses that are likely to cause disease in the upcoming flu season. But even when the vaccine doesnt exactly match these viruses, it may still provide some protection Flu vaccine cannot prevent: 
 flu that is caused by a virus not covered by the vaccine, or 
 illnesses that look like flu but are not. It takes about 2 weeks for protection to develop after vaccination, and protection lasts through the flu season. 3. Some people should not get this vaccine Tell the person who is giving you the vaccine:  If you have any severe, life-threatening allergies. If you ever had a life-threatening allergic reaction after a dose of flu vaccine, or have a severe allergy to any part of this vaccine, you may be advised not to get vaccinated. Most, but not all, types of flu vaccine contain a small amount of egg protein.  If you ever had Guillain-Barré Syndrome (also called GBS). Some people with a history of GBS should not get this vaccine. This should be discussed with your doctor.  If you are not feeling well. It is usually okay to get flu vaccine when you have a mild illness, but you might be asked to come back when you feel better. 4. Risks of a vaccine reaction With any medicine, including vaccines, there is a chance of reactions. These are usually mild and go away on their own, but serious reactions are also possible. Most people who get a flu shot do not have any problems with it. Minor problems following a flu shot include:  
 soreness, redness, or swelling where the shot was given  hoarseness  sore, red or itchy eyes  cough  fever  aches  headache  itching  fatigue If these problems occur, they usually begin soon after the shot and last 1 or 2 days. More serious problems following a flu shot can include the following:  There may be a small increased risk of Guillain-Barré Syndrome (GBS) after inactivated flu vaccine.   This risk has been estimated at 1 or 2 additional cases per million people vaccinated. This is much lower than the risk of severe complications from flu, which can be prevented by flu vaccine.  Young children who get the flu shot along with pneumococcal vaccine (PCV13) and/or DTaP vaccine at the same time might be slightly more likely to have a seizure caused by fever. Ask your doctor for more information. Tell your doctor if a child who is getting flu vaccine has ever had a seizure. Problems that could happen after any injected vaccine:  People sometimes faint after a medical procedure, including vaccination. Sitting or lying down for about 15 minutes can help prevent fainting, and injuries caused by a fall. Tell your doctor if you feel dizzy, or have vision changes or ringing in the ears.  Some people get severe pain in the shoulder and have difficulty moving the arm where a shot was given. This happens very rarely.  Any medication can cause a severe allergic reaction. Such reactions from a vaccine are very rare, estimated at about 1 in a million doses, and would happen within a few minutes to a few hours after the vaccination. As with any medicine, there is a very remote chance of a vaccine causing a serious injury or death. The safety of vaccines is always being monitored. For more information, visit: www.cdc.gov/vaccinesafety/ 
 
5. What if there is a serious reaction? What should I look for?  Look for anything that concerns you, such as signs of a severe allergic reaction, very high fever, or unusual behavior. Signs of a severe allergic reaction can include hives, swelling of the face and throat, difficulty breathing, a fast heartbeat, dizziness, and weakness  usually within a few minutes to a few hours after the vaccination. What should I do?  
 
 If you think it is a severe allergic reaction or other emergency that cant wait, call 9-1-1 and get the person to the nearest hospital. Otherwise, call your doctor.  Reactions should be reported to the Vaccine Adverse Event Reporting System (VAERS). Your doctor should file this report, or you can do it yourself through  the VAERS web site at www.vaers. [facility].gov, or by calling 1-792.701.6565. VAERS does not give medical advice. 6. The National Vaccine Injury Compensation Program 
 
The [facility] Vaccine Injury Compensation Program (VICP) is a federal program that was created to compensate people who may have been injured by certain vaccines. Persons who believe they may have been injured by a vaccine can learn about the program and about filing a claim by calling 0-152.990.5401 or visiting the [employer] website at www.[facility].gov/vaccinecompensation. There is a time limit to file a claim for compensation. 7. How can I learn more?  Ask your healthcare provider. He or she can give you the vaccine package insert or suggest other sources of information.  Call your local or state health department.  Contact the Centers for Disease Control and Prevention (CDC): 
- Call 9-603.482.6188 (1-800-CDC-INFO) or 
- Visit CDCs website at www.cdc.gov/flu Vaccine Information Statement Inactivated Influenza Vaccine 8/7/2015 
42 CHANCE Jones [id number] [facility] and [employer] Centers for Disease Control and Prevention Office Use Only Introducing [facility]! Bee Romero introduces [employer] patient portal. Now you can access parts of your medical record, email your doctor's office, and request medication refills online. 1. In your internet browser, go to [email or web address] 2. Click on the First Time User? Click Here link in the Sign In box. You will see the New Member Sign Up page. 3. Enter your [employer] Access Code exactly as it appears below. You will not need to use this code after youve completed the sign-up process.  If you do not sign up before the expiration date, you must request a new code. 
 
· mTraks Access Code: [email or web address] Expires: 1/31/2018  1:03 PM 
 
4. Enter the last four digits of your Social Security Number (xxxx) and Date of Birth (mm/dd/yyyy) as indicated and click Submit. You will be taken to the next sign-up page. 5. Create a mTraks ID. This will be your mTraks login ID and cannot be changed, so think of one that is secure and easy to remember. 6. Create a [employer]ks password. You can change your password at any time. 7. Enter your Password Reset Question and Answer. This can be used at a later time if you forget your password. 8. Enter your e-mail address. You will receive e-mail notification when new information is available in [address] 9. Click Sign Up. You can now view and download portions of your medical record. 10. Click the Download Summary menu link to download a portable copy of your medical information. If you have questions, please visit the Frequently Asked Questions section of the [employer] website. Remember, mTraks is NOT to be used for urgent needs. For medical emergencies, dial 911. Now available from your iPhone and Android! Please provide this summary of care documentation to your next provider. Your primary care clinician is listed as TORSTEN Pringle. If you have any questions after today's visit, please call [phone].

## 2017-11-03 LAB
HCV AB S/CO SERPL IA: <0.1 S/CO RATIO (ref 0–0.9)
VITAMIN D POC: 26 NG/ML (ref 30–96)

## 2018-03-05 RX ORDER — SIMVASTATIN 20 MG/1
TABLET, FILM COATED ORAL
Qty: 90 TAB | Refills: 3 | Status: SHIPPED | OUTPATIENT
Start: 2018-03-05 | End: 2019-04-19 | Stop reason: SDUPTHER

## 2018-04-03 NOTE — TELEPHONE ENCOUNTER
Requested Prescriptions     Pending Prescriptions Disp Refills    omeprazole (PRILOSEC) 20 mg capsule 90 Cap 3     Sig: Take 1 Cap by mouth daily.        Last Refill: 03/05/18  Next Appointment:05/07/18

## 2018-04-09 RX ORDER — OMEPRAZOLE 20 MG/1
20 CAPSULE, DELAYED RELEASE ORAL DAILY
Qty: 90 CAP | Refills: 3 | Status: SHIPPED | OUTPATIENT
Start: 2018-04-09 | End: 2019-04-19 | Stop reason: SDUPTHER

## 2018-04-27 ENCOUNTER — DOCUMENTATION ONLY (OUTPATIENT)
Dept: INTERNAL MEDICINE CLINIC | Age: 73
End: 2018-04-27

## 2018-04-27 NOTE — PROGRESS NOTES
Patient had called on wed stating that she was seen in the ER in [place] and diagnosed with Colitis - she continues to have blood in stool however it is getting better and she has a follow up visit here with Dr Godfrey Garcia on Wed. May 2 - spoke with Dr Godfrey Garcia as long as it is getting better this is to be expected. She conts to be on antibiotics - tried to call the patient back all day at the number she provided [phone] and no answer and no vm set up.

## 2018-05-02 ENCOUNTER — OFFICE VISIT (OUTPATIENT)
Dept: INTERNAL MEDICINE CLINIC | Age: 73
End: 2018-05-02

## 2018-05-02 VITALS
HEIGHT: 64 IN | DIASTOLIC BLOOD PRESSURE: 74 MMHG | HEART RATE: 78 BPM | BODY MASS INDEX: 29.98 KG/M2 | OXYGEN SATURATION: 95 % | WEIGHT: 175.6 LBS | RESPIRATION RATE: 18 BRPM | TEMPERATURE: 98.4 F | SYSTOLIC BLOOD PRESSURE: 123 MMHG

## 2018-05-02 DIAGNOSIS — K52.9 COLITIS: Primary | ICD-10-CM

## 2018-05-02 DIAGNOSIS — R35.0 FREQUENCY OF URINATION: ICD-10-CM

## 2018-05-02 LAB
BACTERIA UA POCT, BACTPOCT: NORMAL
BILIRUB UR QL STRIP: NEGATIVE
CASTS UA POCT: NORMAL
CLUE CELLS, CLUEPOCT: NEGATIVE
CRYSTALS UA POCT, CRYSPOCT: NEGATIVE
EPITHELIAL CELLS POCT: NORMAL
GLUCOSE UR-MCNC: NEGATIVE MG/DL
GRAN# POC: 6 K/UL (ref 2–7.8)
GRAN% POC: 71.1 % (ref 37–92)
HCT VFR BLD CALC: 42.6 % (ref 37–51)
HGB BLD-MCNC: 14 G/DL (ref 12–18)
KETONES P FAST UR STRIP-MCNC: NEGATIVE MG/DL
LY# POC: 2 K/UL (ref 0.6–4.1)
LY% POC: 24.6 % (ref 10–58.5)
MCH RBC QN: 29.6 PG (ref 26–32)
MCHC RBC-ENTMCNC: 32.8 G/DL (ref 30–36)
MCV RBC: 90 FL (ref 80–97)
MID #, POC: 0.3 K/UL (ref 0–1.8)
MID% POC: 4.3 % (ref 0.1–24)
MUCUS UA POCT, MUCPOCT: NORMAL
PH UR STRIP: 5 [PH] (ref 5–7)
PLATELET # BLD: 319 K/UL (ref 140–440)
PROT UR QL STRIP: NEGATIVE
RBC # BLD: 4.73 M/UL (ref 4.2–6.3)
RBC UA POCT, RBCPOCT: NORMAL
SP GR UR STRIP: 1.01 (ref 1.01–1.02)
TRICH UA POCT, TRICHPOC: NEGATIVE
UA UROBILINOGEN AMB POC: NORMAL (ref 0.2–1)
URINALYSIS CLARITY POC: CLEAR
URINALYSIS COLOR POC: NORMAL
URINE BLOOD POC: NEGATIVE
URINE CULT COMMENT, POCT: NORMAL
URINE LEUKOCYTES POC: NEGATIVE
URINE NITRITES POC: NEGATIVE
WBC # BLD: 8.3 K/UL (ref 4.1–10.9)
WBC UA POCT, WBCPOCT: NORMAL
YEAST UA POCT, YEASTPOC: NEGATIVE

## 2018-05-02 NOTE — MR AVS SNAPSHOT
[address] [facility] [address] [address] [phone] Patient: Theron Varela MRN: [id number] JKB:[id number] Visit Information Date & Time Provider Department Dept. Phone Encounter #  
 5/2/2018 11:00 AM MD Roselia Russell 26 [phone] [id number] Follow-up Instructions Return for as scheduled. Your Appointments 5/7/2018  1:00 PM  
Follow Up with MD Roselia Russell 26 ([address]) Appt Note: [address] [id number] [address] [address] Upcoming Health Maintenance Date Due DTaP/Tdap/Td series (1 - Tdap) 5/26/1965 FOBT Q 1 YEAR AGE 50-75 5/26/1994 ZOSTER VACCINE AGE 60> 3/26/2004 GLAUCOMA SCREENING Q2Y 5/26/2009 Bone Densitometry (Dexa) Screening 5/26/2009 Pneumococcal 65+ High/Highest Risk (1 of 2 - PCV13) 5/26/2009 MEDICARE YEARLY EXAM 3/14/2018 Influenza Age 5 to Adult 8/1/2018 BREAST CANCER SCRN MAMMOGRAM 11/18/2019 Allergies as of 5/2/2018  Review Complete On: 5/2/2018 By: Arnie Curry MD  
  
 Severity Noted Reaction Type Reactions Augmentin [Amoxicillin-pot Clavulanate]  09/26/2017    Unknown (comments) Tetracycline  10/03/2011    Unknown (comments) Current Immunizations  Never Reviewed Name Date Influenza High Dose Vaccine PF 11/2/2017 Not reviewed this visit You Were Diagnosed With   
  
 Codes Comments Colitis    -  Primary ICD-10-CM: K52.9 ICD-9-CM: 558.9 Frequency of urination     ICD-10-CM: R35.0 ICD-9-CM: 788.41 Vitals BP Pulse Temp Resp Height(growth percentile) Weight(growth percentile) 123/74 (BP 1 Location: Left arm, BP Patient Position: Sitting) 78 98.4 °F (36.9 °C) (Oral) 18 5' 4\" (1.626 m) 175 lb 9.6 oz (79.7 kg) SpO2 BMI OB Status Smoking Status 95% 30.14 kg/m2 Hysterectomy Never Smoker Vitals History BMI and BSA Data Body Mass Index Body Surface Area  
 30.14 kg/m 2 1.9 m 2 Preferred Pharmacy Pharmacy Name Phone [facility] [place] [facility] [address] [facility] [address] [place] [address] [phone] Your Updated Medication List  
  
   
This list is accurate as of 5/2/18 12:40 PM.  Always use your most recent med list.  
  
  
  
  
 aspirin 81 mg tablet Take 81 mg by mouth. CALTRATE WITH VITAMIN D3 PO Take  by mouth. CENTRUM SILVER PO Take  by mouth.  
  
 cranberry 500 mg capsule Take  by mouth. omeprazole 20 mg capsule Commonly known as:  PRILOSEC Take 1 Cap by mouth daily. simvastatin 20 mg tablet Commonly known as:  ZOCOR  
TAKE 1 TABLET EVERY DAY  
  
 traZODone 50 mg tablet Commonly known as:  Jennifer Hand Take  by mouth nightly. VITAMIN B-6 PO Take  by mouth. We Performed the Following AMB POC COMPLETE CBC,AUTOMATED ENTER [id number] CPT(R)] AMB POC URINALYSIS DIP STICK AUTO W/ MICRO  [83495 CPT(R)] REFERRAL TO GASTROENTEROLOGY [BSE65 Custom] Comments:  
 See patient for recurring bout of colitis. Follow-up Instructions Return for as scheduled. Referral Information Referral ID Referred By Referred To  
  
 [id number] MAKI, [address] [place] [address] 200 [facility] Visits Status Start Date End Date 1 New Request 5/2/18 5/2/19 If your referral has a status of pending review or denied, additional information will be sent to support the outcome of this decision. Introducing [facility]! [facility] introduces [employer] patient portal. Now you can access parts of your medical record, email your doctor's office, and request medication refills online.    
 
1. In your internet browser, go to [email or web address] 2. Click on the First Time User? Click Here link in the Sign In box. You will see the New Member Sign Up page. 3. Enter your [employer] Access Code exactly as it appears below. You will not need to use this code after youve completed the sign-up process. If you do not sign up before the expiration date, you must request a new code. · [employer] Access Code: [email or web address] Expires: 7/31/2018 11:07 AM 
 
4. Enter the last four digits of your Social Security Number (xxxx) and Date of Birth (mm/dd/yyyy) as indicated and click Submit. You will be taken to the next sign-up page. 5. Create a June [employer] ID. This will be your [employer] login ID and cannot be changed, so think of one that is secure and easy to remember. 6. Create a [employer] password. You can change your password at any time. 7. Enter your Password Reset Question and Answer. This can be used at a later time if you forget your password. 8. Enter your e-mail address. You will receive e-mail notification when new information is available in [address] 9. Click Sign Up. You can now view and download portions of your medical record. 10. Click the Download Summary menu link to download a portable copy of your medical information. If you have questions, please visit the Frequently Asked Questions section of the [employer] website. Remember, [employer] is NOT to be used for urgent needs. For medical emergencies, dial 911. Now available from your iPhone and Android! Please provide this summary of care documentation to your next provider. Your primary care clinician is listed as TORSTEN Choi. If you have any questions after today's visit, please call [phone].

## 2018-05-02 NOTE — PROGRESS NOTES
Exam Room 2  Bushra Aranda is a 67 y.o. female  Chief Complaint   Patient presents with   [address] F/U ER     1. Have you been to the ER, urgent care clinic since your last visit? Hospitalized since your last visit? Yes When: 4/21/2018 Where: [facility] Reason for visit: Colitis    2. Have you seen or consulted any other health care providers outside of the [facility] since your last visit? Include any pap smears or colon screening.  No

## 2018-05-03 NOTE — PROGRESS NOTES
This note will not be viewable in [address] Suri De La Torre is a 67 y.o. female and presents with Hospital Follow Up [facility] F/U ER)  . Subjective:  Patient presents today for follow-up after hospitalization for colitis. She had crampy abdominal pain with bloody stool and was seen in emergency room in [place] where she had a CT scan that demonstrated diffuse inflammation of the transverse and descending colon. She does have a previous history of colitis and diverticulosis. She had a colonoscopy previously couple of years ago that demonstrated some diverticulosis. She is completed a course of Cipro and metronidazole and notes that her symptoms are significantly improved although she has some mild lower abdominal discomfort. She has had no further bloody stools.     Past Medical History:   Diagnosis Date    Back pain 9/26/2017    Cancer ([address]     basal cell of face, squamous, and melanoma    Colitis 04/21/2018    [facility]     Colitis, ischemic [facility]) 9/26/2017    Duodenal ulcer, perforated ([address] 9/26/2017    Dyspepsia and other specified disorders of function of stomach     GERD (gastroesophageal reflux disease)     Hypercholesterolemia     Hyperlipidemia 9/26/2017    Hypertension     not currently    IBS (irritable bowel syndrome) 9/26/2017    IGT (impaired glucose tolerance) 9/26/2017    Insomnia 9/26/2017    Melanoma ([address] 9/26/2017    On statin therapy 9/26/2017    Osteopenia 9/26/2017    PUD (peptic ulcer disease)     Temporal arteritis ([address] 9/26/2017    Vitamin D deficiency 9/26/2017    Zoster 9/26/2017     Past Surgical History:   Procedure Laterality Date    ABDOMEN SURGERY PROC UNLISTED  1985    perforated ulcer    COLONOSCOPY,DIAGNOSTIC  3/17/2015         HX COLONOSCOPY      HX GYN  1984    hysterectomy    HX GYN  1980's    laparoscopy     Allergies   Allergen Reactions    Augmentin [Amoxicillin-Pot Clavulanate] Unknown (comments)    Tetracycline Unknown (comments)     Current Outpatient Prescriptions   Medication Sig Dispense Refill    omeprazole (PRILOSEC) 20 mg capsule Take 1 Cap by mouth daily. 90 Cap 3    simvastatin (ZOCOR) 20 mg tablet TAKE 1 TABLET EVERY DAY 90 Tab 3    traZODone (DESYREL) 50 mg tablet Take  by mouth nightly.  CALCIUM CARBONATE/VITAMIN D3 (CALTRATE WITH VITAMIN D3 PO) Take  by mouth.  aspirin 81 mg tablet Take 81 mg by mouth.  cranberry 500 mg Cap Take  by mouth.  PYRIDOXINE HCL (VITAMIN B-6 PO) Take  by mouth.  MULTIVITAMIN W-MINERALS/LUTEIN (CENTRUM SILVER PO) Take  by mouth. Social History     Social History    Marital status:      Spouse name: N/A    Number of children: N/A    Years of education: N/A     Social History Main Topics    Smoking status: Never Smoker    Smokeless tobacco: Never Used    Alcohol use Yes      Comment: rarely    Drug use: No    Sexual activity: Not Asked     Other Topics Concern    None     Social History Narrative     Family History   Problem Relation Age of Onset    Cancer Mother      uterine    Stroke Maternal Grandfather        Review of Systems  Constitutional:  negative for fevers, chills, anorexia and weight loss  Eyes:    negative for visual disturbance and irritation  ENT:    negative for tinnitus,sore throat,nasal congestion,ear pains. hoarseness  Respiratory:     negative for cough, hemoptysis, dyspnea,wheezing  CV:    negative for chest pain, palpitations, lower extremity edema  GI:    negative for nausea, vomiting, diarrhea,melena  Endo:               negative for polyuria,polydipsia,polyphagia,heat intolerance  Genitourinary : negative for, dysuria and hematuria, positive for frequency this a.m.   Integumentary: negative for rash and pruritus  Hematologic:   negative for easy bruising and gum/nose bleeding  Musculoskel:  negative for myalgias, arthralgias, back pain, muscle weakness, joint pain  Neurological:   negative for headaches, dizziness, vertigo, memory problems and gait   Behavl/Psych:  negative for feelings of anxiety, depression, mood changes  ROS otherwise negative      Objective:  Visit Vitals    /74 (BP 1 Location: Left arm, BP Patient Position: Sitting)    Pulse 78    Temp 98.4 °F (36.9 °C) (Oral)    Resp 18    Ht 5' 4\" (1.626 m)    Wt 175 lb 9.6 oz (79.7 kg)    SpO2 95%    BMI 30.14 kg/m2     Physical Exam:   General appearance - alert, well appearing, and in no distress  Mental status - alert, oriented to person, place, and time  EYE-JENNIFER, EOMI, fundi normal, corneas normal, no foreign bodies  ENT-ENT exam normal, no neck nodes or sinus tenderness  Nose - normal and patent, no erythema, discharge or polyps  Mouth - mucous membranes moist, pharynx normal without lesions  Neck - supple, no significant adenopathy   Chest - clear to auscultation, no wheezes, rales or rhonchi, symmetric air entry   Heart - normal rate, regular rhythm, normal S1, S2, no murmurs, rubs, clicks or gallops   Abdomen - soft, minimally tender, nondistended, no masses or organomegaly  Lymph- no adenopathy palpable  Ext-peripheral pulses normal, no pedal edema, no clubbing or cyanosis  Skin-Warm and dry. no hyperpigmentation, vitiligo, or suspicious lesions  Neuro -alert, oriented, normal speech, no focal findings or movement disorder noted      Assessment/Plan:  Diagnoses and all orders for this visit:    1. Colitis  -     AMB POC COMPLETE CBC,AUTOMATED ENTER  -     REFERRAL TO GASTROENTEROLOGY    2. Frequency of urination  -     AMB POC URINALYSIS DIP STICK AUTO W/ MICRO           ICD-10-CM ICD-9-CM    1. Colitis K52.9 558.9 AMB POC COMPLETE CBC,AUTOMATED ENTER      REFERRAL TO GASTROENTEROLOGY   2. Frequency of urination R35.0 788.41 AMB POC URINALYSIS DIP STICK AUTO W/ MICRO      Plan:    The patient will be referred to GI for follow-up evaluation.   She had a colonoscopy couple of years ago that showed diverticulosis and biopsies were negative for any significant pathology. She has completed a course of antibiotics and appears to be stable at this time. If she has any worsening symptoms in the interim she should return to clinic for evaluation. She had some frequency of urination noted this morning and will have a follow-up UA done to ensure this is clear. Follow-up Disposition:  Return for as scheduled. I have reviewed with the patient details of the assessment and plan and all questions were answered. Relevent patient education was performed. Verbal and/or written instructions (see AVS) provided. The most recent lab findings were reviewed with the patient. Plan was discussed with patient who verbally expressed understanding. An After Visit Summary was printed and given to the patient.     Markie Razo MD

## 2018-05-07 ENCOUNTER — OFFICE VISIT (OUTPATIENT)
Dept: INTERNAL MEDICINE CLINIC | Age: 73
End: 2018-05-07

## 2018-05-07 VITALS
TEMPERATURE: 98 F | OXYGEN SATURATION: 98 % | HEART RATE: 73 BPM | WEIGHT: 171.2 LBS | DIASTOLIC BLOOD PRESSURE: 78 MMHG | HEIGHT: 64 IN | SYSTOLIC BLOOD PRESSURE: 132 MMHG | RESPIRATION RATE: 16 BRPM | BODY MASS INDEX: 29.23 KG/M2

## 2018-05-07 DIAGNOSIS — Z13.31 SCREENING FOR DEPRESSION: ICD-10-CM

## 2018-05-07 DIAGNOSIS — Z79.899 ON STATIN THERAPY: ICD-10-CM

## 2018-05-07 DIAGNOSIS — E55.9 VITAMIN D DEFICIENCY: Primary | ICD-10-CM

## 2018-05-07 DIAGNOSIS — Z13.1 SCREENING FOR DIABETES MELLITUS: ICD-10-CM

## 2018-05-07 DIAGNOSIS — M89.9 DISORDER OF BONE AND CARTILAGE: ICD-10-CM

## 2018-05-07 DIAGNOSIS — E78.00 PURE HYPERCHOLESTEROLEMIA: ICD-10-CM

## 2018-05-07 DIAGNOSIS — M31.6 TEMPORAL ARTERITIS (HCC): ICD-10-CM

## 2018-05-07 DIAGNOSIS — R73.02 IGT (IMPAIRED GLUCOSE TOLERANCE): ICD-10-CM

## 2018-05-07 DIAGNOSIS — Z00.00 MEDICARE ANNUAL WELLNESS VISIT, SUBSEQUENT: ICD-10-CM

## 2018-05-07 DIAGNOSIS — Z13.6 SCREENING FOR ISCHEMIC HEART DISEASE: ICD-10-CM

## 2018-05-07 DIAGNOSIS — Z13.39 SCREENING FOR ALCOHOLISM: ICD-10-CM

## 2018-05-07 DIAGNOSIS — M94.9 DISORDER OF BONE AND CARTILAGE: ICD-10-CM

## 2018-05-07 LAB
ALBUMIN SERPL-MCNC: 4.4 G/DL (ref 3.9–5.4)
ALKALINE PHOS POC: 83 U/L (ref 38–126)
ALT SERPL-CCNC: 30 U/L (ref 9–52)
AST SERPL-CCNC: 24 U/L (ref 14–36)
BUN BLD-MCNC: 13 MG/DL (ref 7–17)
CALCIUM BLD-MCNC: 9.5 MG/DL (ref 8.4–10.2)
CHLORIDE BLD-SCNC: 101 MMOL/L (ref 98–107)
CHOLEST SERPL-MCNC: 162 MG/DL (ref 0–200)
CK (CPK) POC: 69 U/L (ref 30–135)
CO2 POC: 28 MMOL/L (ref 22–32)
CREAT BLD-MCNC: 0.8 MG/DL (ref 0.7–1.2)
EGFR (POC): 73.1
ERYTHROCYTE [SEDIMENTATION RATE] IN BLOOD: 10 MM/HR (ref 0–20)
GLUCOSE POC: 91 MG/DL (ref 65–105)
HDLC SERPL-MCNC: 70 MG/DL (ref 35–130)
LDL CHOLESTEROL POC: 73.4 MG/DL (ref 0–130)
POTASSIUM SERPL-SCNC: 4.5 MMOL/L (ref 3.6–5)
PROT SERPL-MCNC: 7.1 G/DL (ref 6.3–8.2)
SODIUM SERPL-SCNC: 139 MMOL/L (ref 137–145)
TCHOL/HDL RATIO (POC): 2.3 (ref 0–4)
TOTAL BILIRUBIN POC: 0.7 MG/DL (ref 0.2–1.3)
TRIGL SERPL-MCNC: 93 MG/DL (ref 0–200)
VITAMIN D POC: 25.4 NG/ML (ref 30–96)
VLDLC SERPL CALC-MCNC: 18.6 MG/DL

## 2018-05-07 NOTE — PATIENT INSTRUCTIONS
Medicare Wellness Visit, Female    The best way to live healthy is to have a healthy lifestyle by eating a well-balanced diet, exercising regularly, limiting alcohol and stopping smoking. Regular physical exams and screening tests are another way to keep healthy. Preventive exams provided by your health care provider can find health problems before they become diseases or illnesses. Preventive services including immunizations, screening tests, monitoring and exams can help you take care of your own health. All people over age 72 should have a pneumovax  and and a prevnar shot to prevent pneumonia. These are once in a lifetime unless you and your provider decide differently. All people over 65 should have a yearly flu shot and a tetanus vaccine every 10 years. A bone mass density to screen for osteoporosis or thinning of the bones should be done every 2 years after 65. Screening for diabetes mellitus with a blood sugar test should be done every year. Glaucoma is a disease of the eye due to increased ocular pressure that can lead to blindness and it should be done every year by an eye professional.    Cardiovascular screening tests that check for elevated lipids (fatty part of blood) which can lead to heart disease and strokes should be done every 5 years. Colorectal screening that evaluates for blood or polyps in your colon should be done yearly as a stool test or every five years as a flexible sigmoidoscope or every 10 years as a colonoscopy up to age 76. Breast cancer screening with a mammogram is recommended biennially  for women age 54-69. Screening for cervical cancer with a pap smear and pelvic exam is recommended for women after age 72 years every 2 years up to age 79 or when the provider and patient decide to stop. If there is a history of cervical abnormalities or other increased risk for cancer then the test is recommended yearly.     Hepatitis C screening is also recommended for anyone born between 80 through [address]. A shingles vaccine is also recommended once in a lifetime after age 61. Your Medicare Wellness Exam is recommended annually. Here is a list of your current Health Maintenance items with a due date:  Health Maintenance Due   Topic Date Due    DTaP/Tdap/Td  (1 - Tdap) 05/26/1965    Stool testing for trace blood  05/26/1994    Shingles Vaccine  03/26/2004    Glaucoma Screening   05/26/2009    Bone Mineral Density   05/26/2009    Pneumococcal Vaccine (1 of 2 - PCV13) 05/26/2009    Annual Well Visit  03/14/2018       All Medicare beneficiaries aged 48 and older are covered; however, when a beneficiary is at high risk, there is no minimum age required to receive a screening colonoscopy or a barium enema rendered as an alternative to a screening colonoscopy. The following are the coverage criteria for each colorectal cancer screening test/procedure. Screening FOBT  Medicare provides coverage of a screening FOBT annually (i.e., at least 11 months have passed following the month in which the last covered screening FOBT was performed) for beneficiaries aged 48 and older. This screening requires a written order from the beneficiarys attending physician. NOTE: Medicare will only provide coverage for one FOBT per year: either HCPCS code  or CPT code 74198, but not both. Screening Colonoscopy  For Beneficiaries at [facility] for Developing Colorectal Cancer  Medicare provides coverage of a screening colonoscopy (HCPCS code ) once every 2 years for beneficiaries at high risk for developing colorectal cancer (i.e., at least 23 months have passed following the month in which the last covered screening colonoscopy [HCPCS code ] was performed).     For Beneficiaries Not at [facility] for Developing Colorectal Cancer  Medicare provides coverage of a screening colonoscopy (HCPCS code ) for beneficiaries who do not meet the criteria for being at high risk for developing colorectal cancer once every 10 years (i.e., at least 119 months have passed following the month in which the last covered screening colonoscopy [facility] ] was performed). If the beneficiary otherwise qualifies to have a covered screening colonoscopy ([facility] ) based on the above but has had a covered screening flexible sigmoidoscopy ([facility] code ), then Medicare may cover a screening colonoscopy ([facility] code ) only after at least 47 months have passed following the month in which the last covered screening flexible sigmoidoscopy ([facility] ) was performed. Medicare provides coverage of bone mass measurements that meet coverage criteria 1-6 below. 1. The bone mass measurement is performed on a qualified individual. A qualified individual means a Medicare beneficiary who meets the medical indications for at least one of the following categories:    A woman who has been determined by the physician or qualified non-physician practitioner treating her to be estrogen-deficient and at clinical risk for osteoporosis, based on her medical history and other findings;   An individual with vertebral abnormalities, demonstrated by an X-ray to be indicative of osteoporosis, osteopenia (low bone mass), or vertebral fracture;   An individual receiving (or expecting to receive) glucocorticoid (steroid) therapy equivalent to an average of 5.0 mg of prednisone or greater per day for more than three months;   An individual with known primary hyperparathyroidism; or   An individual being monitored to assess the response to, or efficacy of, an FDA-approved osteoporosis drug therapy.     2. The physician or qualified non-physician practitioner treating the qualified individual must provide an order for a bone mass measurement test, following an evaluation of the need for a bone mass measurement that included a determination of the medically appropriate measurement for the individual.  3. The service must be a radiologic or radioisotopic procedure (or other procedure) that meets the following requirements:   · Is performed with a bone densitometer (other than dual photon absorptiometry) or a bone sonometer (e.g., ultrasound) device approved or cleared for marketing by the FDA;  · Is performed for the purpose of identifying bone mass, detecting bone loss, or determining bone quality; and  · Includes a physicians interpretation of the procedures results. 4. A qualified supplier or provider must furnish such services under the appropriate level of supervision by a physician. 5. The service must be reasonable and medically necessary to diagnose, treat, or monitor a qualified individual.  6.  The service must be performed at a frequency that conforms to the requirements below:    Frequency Requirements  Medicare provides coverage of a bone mass measurement that meets the criteria described above once every 2 years (i.e., at least 23 months after the last covered bone mass measurement test was performed). NOTE: If medically necessary, Medicare may provide coverage for a beneficiary more frequently than every two years. Examples of situations in which more frequent bone mass measurements may be medically necessary include, but are not limited to, the following medical conditions: Monitoring patients on long-term glucocorticoid (steroid) therapy for more than three months.

## 2018-05-07 NOTE — PROGRESS NOTES
Chief Complaint   Patient presents with    Follow-up       1. Have you been to the ER, urgent care clinic since your last visit? Hospitalized since your last visit? no    2. Have you seen or consulted any other health care providers outside of the [address] [place] [address] since your last visit? Include any pap smears or colon screening.   no

## 2018-05-07 NOTE — PROGRESS NOTES
This is the Subsequent Medicare Annual Wellness Exam, performed 12 months or more after the Initial AWV or the last Subsequent AWV    I have reviewed the patient's medical history in detail and updated the computerized patient record. History     Past Medical History:   Diagnosis Date    Back pain 9/26/2017    Cancer ([facility] [address]     basal cell of face, squamous, and melanoma    Colitis 04/21/2018    [facility]     Colitis, ischemic [facility]) 9/26/2017    Duodenal ulcer, perforated ([facility] [address] 9/26/2017    Dyspepsia and other specified disorders of function of stomach     GERD (gastroesophageal reflux disease)     Hypercholesterolemia     Hyperlipidemia 9/26/2017    Hypertension     not currently    IBS (irritable bowel syndrome) 9/26/2017    IGT (impaired glucose tolerance) 9/26/2017    Insomnia 9/26/2017    Melanoma ([facility] [address] 9/26/2017    On statin therapy 9/26/2017    Osteopenia 9/26/2017    PUD (peptic ulcer disease)     Temporal arteritis ([facility] [address] 9/26/2017    Vitamin D deficiency 9/26/2017    Zoster 9/26/2017      Past Surgical History:   Procedure Laterality Date    ABDOMEN SURGERY PROC UNLISTED  1985    perforated ulcer    COLONOSCOPY,DIAGNOSTIC  3/17/2015         HX COLONOSCOPY      HX GYN  1984    hysterectomy    HX GYN  1980's    laparoscopy     Current Outpatient Prescriptions   Medication Sig Dispense Refill    varicella-zoster recombinant, PF, (SHINGRIX, PF,) 50 mcg/0.5 mL susr injection 0.5 mL by IntraMUSCular route once for 1 dose. 1 Each 1    omeprazole (PRILOSEC) 20 mg capsule Take 1 Cap by mouth daily. 90 Cap 3    simvastatin (ZOCOR) 20 mg tablet TAKE 1 TABLET EVERY DAY 90 Tab 3    traZODone (DESYREL) 50 mg tablet Take  by mouth nightly.  CALCIUM CARBONATE/VITAMIN D3 (CALTRATE WITH VITAMIN D3 PO) Take  by mouth.  aspirin 81 mg tablet Take 81 mg by mouth.  cranberry 500 mg Cap Take  by mouth.       MULTIVITAMIN W-MINERALS/LUTEIN (CENTRUM SILVER PO) Take  by mouth.      PYRIDOXINE HCL (VITAMIN B-6 PO) Take  by mouth. Allergies   Allergen Reactions    Augmentin [Amoxicillin-Pot Clavulanate] Unknown (comments)    Tetracycline Unknown (comments)     Family History   Problem Relation Age of Onset    Cancer Mother      uterine    Stroke Maternal Grandfather      Social History   Substance Use Topics    Smoking status: Never Smoker    Smokeless tobacco: Never Used    Alcohol use Yes      Comment: rarely     Patient Active Problem List   Diagnosis Code    Abdominal pain R10.9    Gallstones K80.20    Hyperlipidemia E78.5    Osteopenia M85.80    Zoster B02.9    Melanoma ([facility] [address] C43.9    Temporal arteritis ([facility] [address] M31.6    On statin therapy Z79.899    Vitamin D deficiency E55.9    IGT (impaired glucose tolerance) R73.02    IBS (irritable bowel syndrome) K58.9    Duodenal ulcer, perforated ([facility] [address] K26.5    Colitis, ischemic (HCC) K55.9    Insomnia G47.00    Back pain M54.9       Depression Risk Factor Screening:     PHQ over the last two weeks 5/2/2018   Little interest or pleasure in doing things Not at all   Feeling down, depressed or hopeless Not at all   Total Score PHQ 2 0     Alcohol Risk Factor Screening: You do not drink alcohol or very rarely. Functional Ability and Level of Safety:   Hearing Loss  Hearing is good. Activities of Daily Living  The home contains: no safety equipment. Patient does total self care    Fall Risk  Fall Risk Assessment, last 12 mths 5/2/2018   Able to walk? Yes   Fall in past 12 months?  No   Fall with injury? -   Number of falls in past 12 months -   Fall Risk Score -       Abuse Screen  Patient is not abused    Cognitive Screening   Evaluation of Cognitive Function:  Has your family/caregiver stated any concerns about your memory: no  Normal    Patient Care Team   Patient Care Team:  Rhoda Anderson MD as PCP - General (Internal Medicine)    Assessment/Plan   Education and counseling provided:  Are appropriate based on today's review and evaluation    Diagnoses and all orders for this visit:    1. Vitamin D deficiency  -     AMB POC VITAMIN D    2. Pure hypercholesterolemia  -     AMB POC LIPID PROFILE    3. On statin therapy  -     AMB POC COMPREHENSIVE METABOLIC PANEL  -     AMB POC CK (CPK)    4. IGT (impaired glucose tolerance)  -     AMB POC HEMOGLOBIN A1C  -     AMB POC COMPREHENSIVE METABOLIC PANEL    5. Medicare annual wellness visit, subsequent    6. Screening for alcoholism  -     Annual  Alcohol Screen 15 min ()    7. Screening for depression  -     Depression Screen Annual    8. Screening for diabetes mellitus    9. Screening for ischemic heart disease    10. Disorder of bone and cartilage  -     Dexa Bone Density Study Axial ([id number]); Future    Other orders  -     varicella-zoster recombinant, PF, (SHINGRIX, PF,) 50 mcg/0.5 mL susr injection; 0.5 mL by IntraMUSCular route once for 1 dose. Health Maintenance Due   Topic Date Due    DTaP/Tdap/Td series (1 - Tdap) 05/26/1965    FOBT Q 1 YEAR AGE 50-75  05/26/1994    ZOSTER VACCINE AGE 60>  03/26/2004    GLAUCOMA SCREENING Q2Y  05/26/2009    Bone Densitometry (Dexa) Screening  05/26/2009    Pneumococcal 65+ High/Highest Risk (1 of 2 - PCV13) 05/26/2009    MEDICARE YEARLY EXAM  03/14/2018     This note will not be viewable in MyChart. Pa Scales is a 67 y.o. female and presents with Follow-up  . Subjective:  Patient presents today for follow-up of vitamin D deficiency, hyperlipidemia, on statin therapy, impaired glucose tolerance, and osteopenia. The patient had a recent bout of colitis and has a follow-up with GI scheduled next week. She has completed her course of antibiotics and is feeling significantly better but notes that her stools have not quite gotten back to normal consistency at this point time. She has not had any further melena or hematochezia.   She denies any shortness of breath, chest pain, PND, orthopnea, or pedal edema. Past Medical History:   Diagnosis Date    Back pain 9/26/2017    Cancer ([facility] [address]     basal cell of face, squamous, and melanoma    Colitis 04/21/2018    [facility]     Colitis, ischemic [facility]) 9/26/2017    Duodenal ulcer, perforated ([facility] [address] 9/26/2017    Dyspepsia and other specified disorders of function of stomach     GERD (gastroesophageal reflux disease)     Hypercholesterolemia     Hyperlipidemia 9/26/2017    Hypertension     not currently    IBS (irritable bowel syndrome) 9/26/2017    IGT (impaired glucose tolerance) 9/26/2017    Insomnia 9/26/2017    Melanoma ([facility] [address] 9/26/2017    On statin therapy 9/26/2017    Osteopenia 9/26/2017    PUD (peptic ulcer disease)     Temporal arteritis ([facility] 9/26/2017    Vitamin D deficiency 9/26/2017    Zoster 9/26/2017     Past Surgical History:   Procedure Laterality Date    ABDOMEN SURGERY PROC UNLISTED  1985    perforated ulcer    COLONOSCOPY,DIAGNOSTIC  3/17/2015         HX COLONOSCOPY      HX GYN  1984    hysterectomy    HX GYN  1980's    laparoscopy     Allergies   Allergen Reactions    Augmentin [Amoxicillin-Pot Clavulanate] Unknown (comments)    Tetracycline Unknown (comments)     Current Outpatient Prescriptions   Medication Sig Dispense Refill    varicella-zoster recombinant, PF, (SHINGRIX, PF,) 50 mcg/0.5 mL susr injection 0.5 mL by IntraMUSCular route once for 1 dose. 1 Each 1    omeprazole (PRILOSEC) 20 mg capsule Take 1 Cap by mouth daily. 90 Cap 3    simvastatin (ZOCOR) 20 mg tablet TAKE 1 TABLET EVERY DAY 90 Tab 3    traZODone (DESYREL) 50 mg tablet Take  by mouth nightly.  CALCIUM CARBONATE/VITAMIN D3 (CALTRATE WITH VITAMIN D3 PO) Take  by mouth.  aspirin 81 mg tablet Take 81 mg by mouth.  cranberry 500 mg Cap Take  by mouth.  MULTIVITAMIN W-MINERALS/LUTEIN (CENTRUM SILVER PO) Take  by mouth.  PYRIDOXINE HCL (VITAMIN B-6 PO) Take  by mouth.        Social History     Social History    Marital status:      Spouse name: N/A    Number of children: N/A    Years of education: N/A     Social History Main Topics    Smoking status: Never Smoker    Smokeless tobacco: Never Used    Alcohol use Yes      Comment: rarely    Drug use: No    Sexual activity: Not Asked     Other Topics Concern    None     Social History Narrative     Family History   Problem Relation Age of Onset    Cancer Mother      uterine    Stroke Maternal Grandfather        Review of Systems  Constitutional:  negative for fevers, chills, anorexia and weight loss  Eyes:    negative for visual disturbance and irritation  ENT:    negative for tinnitus,sore throat,nasal congestion,ear pains. hoarseness  Respiratory:     negative for cough, hemoptysis, dyspnea,wheezing  CV:    negative for chest pain, palpitations, lower extremity edema  GI:    negative for nausea, vomiting, diarrhea, abdominal pain,melena  Endo:               negative for polyuria,polydipsia,polyphagia,heat intolerance  Genitourinary : negative for frequency, dysuria and hematuria  Integumentary: negative for rash and pruritus  Hematologic:   negative for easy bruising and gum/nose bleeding  Musculoskel:  negative for myalgias, arthralgias, back pain, muscle weakness, joint pain  Neurological:   negative for headaches, dizziness, vertigo, memory problems and gait   Behavl/Psych:  negative for feelings of anxiety, depression, mood changes  ROS otherwise negative      Objective:  Visit Vitals    /78 (BP 1 Location: Left arm, BP Patient Position: Sitting)    Pulse 73    Temp 98 °F (36.7 °C) (Oral)    Resp 16    Ht 5' 4\" (1.626 m)    Wt 171 lb 3.2 oz (77.7 kg)    SpO2 98%    BMI 29.39 kg/m2     Physical Exam:   General appearance - alert, well appearing, and in no distress  Mental status - alert, oriented to person, place, and time  EYE-JENNIFER, EOMI, fundi normal, corneas normal, no foreign bodies  ENT-ENT exam normal, no neck nodes or sinus tenderness  Nose - normal and patent, no erythema, discharge or polyps  Mouth - mucous membranes moist, pharynx normal without lesions  Neck - supple, no significant adenopathy   Chest - clear to auscultation, no wheezes, rales or rhonchi, symmetric air entry   Heart - normal rate, regular rhythm, normal S1, S2, no murmurs, rubs, clicks or gallops   Abdomen - soft, nontender, nondistended, no masses or organomegaly  Lymph- no adenopathy palpable  Ext-peripheral pulses normal, no pedal edema, no clubbing or cyanosis  Skin-Warm and dry. no hyperpigmentation, vitiligo, or suspicious lesions  Neuro -alert, oriented, normal speech, no focal findings or movement disorder noted      Assessment/Plan:  Diagnoses and all orders for this visit:    1. Vitamin D deficiency  -     AMB POC VITAMIN D    2. Pure hypercholesterolemia  -     AMB POC LIPID PROFILE  -     REFERRAL TO CARDIOLOGY    3. On statin therapy  -     AMB POC COMPREHENSIVE METABOLIC PANEL  -     AMB POC CK (CPK)  -     REFERRAL TO CARDIOLOGY    4. IGT (impaired glucose tolerance)  -     AMB POC HEMOGLOBIN A1C  -     AMB POC COMPREHENSIVE METABOLIC PANEL    5. Medicare annual wellness visit, subsequent    6. Screening for alcoholism  -     Annual  Alcohol Screen 15 min ()    7. Screening for depression  -     Depression Screen Annual    8. Screening for diabetes mellitus    9. Screening for ischemic heart disease    10. Disorder of bone and cartilage  -     Dexa Bone Density Study Axial ([id number]); Future    11. Temporal arteritis (HCC)  -     AMB POC SEDIMENTATION RATE, ERYTHROCYTE; NON AUTO    Other orders  -     varicella-zoster recombinant, PF, (SHINGRIX, PF,) 50 mcg/0.5 mL susr injection; 0.5 mL by IntraMUSCular route once for 1 dose. ICD-10-CM ICD-9-CM    1. Vitamin D deficiency E55.9 268.9 AMB POC VITAMIN D   2. Pure hypercholesterolemia E78.00 272.0 AMB POC LIPID PROFILE      REFERRAL TO CARDIOLOGY   3.  On statin therapy Z79.899 V58.69 AMB POC COMPREHENSIVE METABOLIC PANEL      AMB POC CK (CPK)      REFERRAL TO CARDIOLOGY   4. IGT (impaired glucose tolerance) R73.02 790.22 AMB POC HEMOGLOBIN A1C      AMB POC COMPREHENSIVE METABOLIC PANEL   5. Medicare annual wellness visit, subsequent Z00.00 V70.0    6. Screening for alcoholism Z13.89 V79.1 [place] ANNUAL ALCOHOL SCREEN 15 MIN   7. Screening for depression Z13.89 V79.0 DEPRESSION SCREEN ANNUAL   8. Screening for diabetes mellitus Z13.1 V77.1    9. Screening for ischemic heart disease Z13.6 V81.0    10. Disorder of bone and cartilage M89.9 733.90 DEXA BONE DENSITY STUDY AXIAL    M94.9     11. Temporal arteritis (HCC) M31.6 446.5 AMB POC SEDIMENTATION RATE, ERYTHROCYTE; NON AUTO     Plan:    Follow-up labs as ordered above. Sed rate to be performed given previous history of temporal arteritis. Follow-up with GI regarding history of colitis with recent exacerbation while in [place] (see previous office note for follow-up after return from [place]). Follow-up Disposition:  Return in about 6 months (around 11/7/2018). I have reviewed with the patient details of the assessment and plan and all questions were answered. Relevent patient education was performed. Verbal and/or written instructions (see AVS) provided. The most recent lab findings were reviewed with the patient. Plan was discussed with patient who verbally expressed understanding. An After Visit Summary was printed and given to the patient.     Leandra Sales MD

## 2018-05-07 NOTE — MR AVS SNAPSHOT
[address] [facility] [address] [address] [phone] Patient: Mona Gibbs MRN: [id number] DHS:[birth year] Visit Information Date & Time Provider Department Dept. Phone Encounter #  
 5/7/2018  1:00 PM TORSTEN Pisano MD [facility] [phone] [id number] Follow-up Instructions Return in about 6 months (around 11/7/2018). Upcoming Health Maintenance Date Due DTaP/Tdap/Td series (1 - Tdap) 5/26/1965 FOBT Q 1 YEAR AGE 50-75 5/26/1994 ZOSTER VACCINE AGE 60> 3/26/2004 GLAUCOMA SCREENING Q2Y 5/26/2009 Bone Densitometry (Dexa) Screening 5/26/2009 Pneumococcal 65+ High/Highest Risk (1 of 2 - PCV13) 5/26/2009 MEDICARE YEARLY EXAM 3/14/2018 Influenza Age 5 to Adult 8/1/2018 BREAST CANCER SCRN MAMMOGRAM 11/18/2019 Allergies as of 5/7/2018  Review Complete On: 5/7/2018 By: Angel Carrasco LPN Severity Noted Reaction Type Reactions Augmentin [Amoxicillin-pot Clavulanate]  09/26/2017    Unknown (comments) Tetracycline  10/03/2011    Unknown (comments) Current Immunizations  Never Reviewed Name Date Influenza High Dose Vaccine PF 11/2/2017 Not reviewed this visit You Were Diagnosed With   
  
 Codes Comments Vitamin D deficiency    -  Primary ICD-10-CM: E55.9 ICD-9-CM: 268.9 Pure hypercholesterolemia     ICD-10-CM: E78.00 ICD-9-CM: 272.0 On statin therapy     ICD-10-CM: Z79.899 ICD-9-CM: V58.69 IGT (impaired glucose tolerance)     ICD-10-CM: R73.02 
ICD-9-CM: 790.22 Medicare annual wellness visit, subsequent     ICD-10-CM: Z00.00 ICD-9-CM: V70.0 Screening for alcoholism     ICD-10-CM: Z13.89 ICD-9-CM: V79.1 Screening for depression     ICD-10-CM: Z13.89 ICD-9-CM: V79.0 Screening for diabetes mellitus     ICD-10-CM: Z13.1 ICD-9-CM: V77.1 Screening for ischemic heart disease     ICD-10-CM: Z13.6 ICD-9-CM: V81.0 Disorder of bone and cartilage     ICD-10-CM: M89.9, M94.9 ICD-9-CM: 733.90 Temporal arteritis (HCC)     ICD-10-CM: M31.6 ICD-9-CM: 446. 5 Vitals BP Pulse Temp Resp Height(growth percentile) Weight(growth percentile) 132/78 (BP 1 Location: Left arm, BP Patient Position: Sitting) 73 98 °F (36.7 °C) (Oral) 16 5' 4\" (1.626 m) 171 lb 3.2 oz (77.7 kg) SpO2 BMI OB Status Smoking Status 98% 29.39 kg/m2 Hysterectomy Never Smoker Vitals History BMI and BSA Data Body Mass Index Body Surface Area  
 29.39 kg/m 2 1.87 m 2 Preferred Pharmacy Pharmacy Name Phone [facility] [address] [phone] Your Updated Medication List  
  
   
This list is accurate as of 18  2:29 PM.  Always use your most recent med list.  
  
  
  
  
 aspirin 81 mg tablet Take 81 mg by mouth. CALTRATE WITH VITAMIN D3 PO Take  by mouth. CENTRUM SILVER PO Take  by mouth.  
  
 cranberry 500 mg capsule Take  by mouth. omeprazole 20 mg capsule Commonly known as:  PRILOSEC Take 1 Cap by mouth daily. simvastatin 20 mg tablet Commonly known as:  ZOCOR  
TAKE 1 TABLET EVERY DAY  
  
 traZODone 50 mg tablet Commonly known as:  Kelsi Hesselbach Take  by mouth nightly. varicella-zoster recombinant (PF) 50 mcg/0.5 mL Susr injection Commonly known as:  SHINGRIX (PF)  
0.5 mL by IntraMUSCular route once for 1 dose. VITAMIN B-6 PO Take  by mouth. Prescriptions Printed Refills  
 varicella-zoster recombinant, PF, (SHINGRIX, PF,) 50 mcg/0.5 mL susr injection 1 Si.5 mL by IntraMUSCular route once for 1 dose. Class: Print Route: IntraMUSCular We Performed the Following AMB POC CK (CPK) [25737 CPT(R)] AMB POC COMPREHENSIVE METABOLIC PANEL [82843 CPT(R)] AMB POC HEMOGLOBIN A1C [38902 CPT(R)] AMB POC LIPID PROFILE [65980 CPT(R)] AMB POC SEDIMENTATION RATE, ERYTHROCYTE; NON AUTO [36326 CPT(R)] AMB POC VITAMIN D [04391 CPT(R)] [address] [[id number] [facility]] [place] ANNUAL ALCOHOL SCREEN 15 MIN [id number] [facility]] REFERRAL TO CARDIOLOGY [AEN53 Custom] Comments:  
 Please see patient for cardiovascular evaluation, history of hyperlipidemia. Follow-up Instructions Return in about 6 months (around 11/7/2018). To-Do List   
 05/10/2018 Imaging:  DEXA BONE DENSITY STUDY AXIAL Referral Information Referral ID Referred By Referred To  
  
 [id number] MD Daniel   
   [address] [place] [address], [address] [facility] Phone: [phone] Fax: [phone] Visits Status Start Date End Date 1 New Request 5/7/18 5/7/19 If your referral has a status of pending review or denied, additional information will be sent to support the outcome of this decision. Patient Instructions Medicare Wellness Visit, Female The best way to live healthy is to have a healthy lifestyle by eating a well-balanced diet, exercising regularly, limiting alcohol and stopping smoking. Regular physical exams and screening tests are another way to keep healthy. Preventive exams provided by your health care provider can find health problems before they become diseases or illnesses. Preventive services including immunizations, screening tests, monitoring and exams can help you take care of your own health. All people over age 72 should have a pneumovax  and and a prevnar shot to prevent pneumonia. These are once in a lifetime unless you and your provider decide differently. All people over 65 should have a yearly flu shot and a tetanus vaccine every 10 years. A bone mass density to screen for osteoporosis or thinning of the bones should be done every 2 years after 65.  
 
Screening for diabetes mellitus with a blood sugar test should be done every year. Glaucoma is a disease of the eye due to increased ocular pressure that can lead to blindness and it should be done every year by an eye professional. 
 
Cardiovascular screening tests that check for elevated lipids (fatty part of blood) which can lead to heart disease and strokes should be done every 5 years. Colorectal screening that evaluates for blood or polyps in your colon should be done yearly as a stool test or every five years as a flexible sigmoidoscope or every 10 years as a colonoscopy up to age 76. Breast cancer screening with a mammogram is recommended biennially  for women age 54-69. Screening for cervical cancer with a pap smear and pelvic exam is recommended for women after age 72 years every 2 years up to age 79 or when the provider and patient decide to stop. If there is a history of cervical abnormalities or other increased risk for cancer then the test is recommended yearly. Hepatitis C screening is also recommended for anyone born between 80 through [address]. A shingles vaccine is also recommended once in a lifetime after age 61. Your Medicare Wellness Exam is recommended annually. Here is a list of your current Health Maintenance items with a due date: 
Health Maintenance Due Topic Date Due  
 DTaP/Tdap/Td  (1 - Tdap) 05/26/1965  Stool testing for trace blood  05/26/1994  Shingles Vaccine  03/26/2004  Glaucoma Screening   05/26/2009  Bone Mineral Density   05/26/2009  Pneumococcal Vaccine (1 of 2 - PCV13) 05/26/2009 [address] [place] [address] Annual Well Visit  03/14/2018 All Medicare beneficiaries aged 48 and older are covered; however, when a beneficiary is at high risk, there is no minimum age required to receive a screening colonoscopy or a barium enema rendered as an alternative to a screening colonoscopy. The following are the coverage criteria for each colorectal cancer screening test/procedure. Screening FOBT Medicare provides coverage of a screening FOBT annually (i.e., at least 11 months have passed following the month in which the last covered screening FOBT was performed) for beneficiaries aged 48 and older. This screening requires a written order from the beneficiarys attending physician. NOTE: Medicare will only provide coverage for one FOBT per year: either HCPCS code  or CPT code 63158, but not both. Screening Colonoscopy For Beneficiaries at [facility] for Developing Colorectal Cancer Medicare provides coverage of a screening colonoscopy (HCPCS code ) once every 2 years for beneficiaries at high risk for developing colorectal cancer (i.e., at least 23 months have passed following the month in which the last covered screening colonoscopy [HCPCS code ] was performed). For Beneficiaries Not at [address] for Developing Colorectal Cancer Medicare provides coverage of a screening colonoscopy (HCPCS code ) for beneficiaries who do not meet the criteria for being at high risk for developing colorectal cancer once every 10 years (i.e., at least 119 months have passed following the month in which the last covered screening colonoscopy [HCPCS code ] was performed). If the beneficiary otherwise qualifies to have a covered screening colonoscopy (HCPCS code ) based on the above but has had a covered screening flexible sigmoidoscopy (HCPCS code ), then Medicare may cover a screening colonoscopy (HCPCS code ) only after at least 47 months have passed following the month in which the last covered screening flexible sigmoidoscopy (HCPCS code ) was performed. Medicare provides coverage of bone mass measurements that meet coverage criteria 1-6 below. 1. The bone mass measurement is performed on a qualified individual. A qualified individual means a Medicare beneficiary who meets the medical indications for at least one of the following categories: ? A woman who has been determined by the physician or qualified non-physician practitioner treating her to be estrogen-deficient and at clinical risk for osteoporosis, based on her medical history and other findings; 
? An individual with vertebral abnormalities, demonstrated by an X-ray to be indicative of osteoporosis, osteopenia (low bone mass), or vertebral fracture; 
? An individual receiving (or expecting to receive) glucocorticoid (steroid) therapy equivalent to an average of 5.0 mg of prednisone or greater per day for more than three months; 
? An individual with known primary hyperparathyroidism; or ? An individual being monitored to assess the response to, or efficacy of, an FDA-approved osteoporosis drug therapy. 2. The physician or qualified non-physician practitioner treating the qualified individual must provide an order for a bone mass measurement test, following an evaluation of the need for a bone mass measurement that included a determination of the medically appropriate measurement for the individual. 
3. The service must be a radiologic or radioisotopic procedure (or other procedure) that meets the following requirements: · Is performed with a bone densitometer (other than dual photon absorptiometry) or a bone sonometer (e.g., ultrasound) device approved or cleared for marketing by the FDA; 
· Is performed for the purpose of identifying bone mass, detecting bone loss, or determining bone quality; and 
· Includes a physicians interpretation of the procedures results. 4. A qualified supplier or provider must furnish such services under the appropriate level of supervision by a physician. 5. The service must be reasonable and medically necessary to diagnose, treat, or monitor a qualified individual. 
6.  The service must be performed at a frequency that conforms to the requirements below: 
 
Frequency Requirements Medicare provides coverage of a bone mass measurement that meets the criteria described above once every 2 years (i.e., at least 23 months after the last covered bone mass measurement test was performed). NOTE: If medically necessary, Medicare may provide coverage for a beneficiary more frequently than every two years. Examples of situations in which more frequent bone mass measurements may be medically necessary include, but are not limited to, the following medical conditions: Monitoring patients on long-term glucocorticoid (steroid) therapy for more than three months. Introducing [facility] SERVICES! [facility] introduces To[employer] patient portal. Now you can access parts of your medical record, email your doctor's office, and request medication refills online. 1. In your internet browser, go to [email or web address] [email or web address]. [email or web address] It Interactive 2. Click on the First Time User? Click Here link in the Sign In box. You will see the New Member Sign Up page. 3. Enter your Toovari Access Code exactly as it appears below. You will not need to use this code after youve completed the sign-up process. If you do not sign up before the expiration date, you must request a new code. · To[employer] Access Code: [email or web address] Expires: 7/31/2018 11:07 AM 
 
4. Enter the last four digits of your Social Security Number (xxxx) and Date of Birth (mm/dd/yyyy) as indicated and click Submit. You will be taken to the next sign-up page. 5. Create a Toovari ID. This will be your Toovari login ID and cannot be changed, so think of one that is secure and easy to remember. 6. Create a Toovari password. You can change your password at any time. 7. Enter your Password Reset Question and Answer. This can be used at a later time if you forget your password. 8. Enter your e-mail address. You will receive e-mail notification when new information is available in [address] 9. Click Sign Up. You can now view and download portions of your medical record. 10. Click the Download Summary menu link to download a portable copy of your medical information. If you have questions, please visit the Frequently Asked Questions section of the [employer] website. Remember, Xylo is NOT to be used for urgent needs. For medical emergencies, dial 911. Now available from your iPhone and Android! Please provide this summary of care documentation to your next provider. Your primary care clinician is listed as TORSTEN Brown. If you have any questions after today's visit, please call [phone].

## 2018-05-08 LAB — HBA1C MFR BLD HPLC: 6.6 % (ref 4.5–5.7)

## 2018-06-01 ENCOUNTER — OFFICE VISIT (OUTPATIENT)
Dept: CARDIOLOGY CLINIC | Age: 73
End: 2018-06-01

## 2018-06-01 VITALS
WEIGHT: 173 LBS | OXYGEN SATURATION: 96 % | RESPIRATION RATE: 18 BRPM | DIASTOLIC BLOOD PRESSURE: 80 MMHG | BODY MASS INDEX: 29.53 KG/M2 | HEIGHT: 64 IN | SYSTOLIC BLOOD PRESSURE: 124 MMHG | HEART RATE: 87 BPM

## 2018-06-01 DIAGNOSIS — Z79.899 ON STATIN THERAPY: ICD-10-CM

## 2018-06-01 DIAGNOSIS — E78.00 PURE HYPERCHOLESTEROLEMIA: Primary | ICD-10-CM

## 2018-06-01 NOTE — PROGRESS NOTES
Ghislaine Cedeno DNP, ANP-BC  Subjective/HPI:     Bushra Aranda is a 75 y.o. female is here for new patient consultation has a history of hyperlipidemia greater than 10 years. She reports she is feeling well in her usual state of health with denial of chest pain dyspnea on exertion or fatigue. She denies any family history of coronary disease, both parents  at a young age at 60 noncardiac events. There is a history of CVA in the family. She is a non-smoker. PCP Provider  Deanna Reinoso MD  Past Medical History:   Diagnosis Date    Back pain 2017    Cancer ([facility]     basal cell of face, squamous, and melanoma    Colitis 2018    [facility] ER     Colitis, ischemic [facility]) 2017    Duodenal ulcer, perforated ([facility] 2017    Dyspepsia and other specified disorders of function of stomach     GERD (gastroesophageal reflux disease)     Hypercholesterolemia     Hyperlipidemia 2017    Hypertension     not currently    IBS (irritable bowel syndrome) 2017    IGT (impaired glucose tolerance) 2017    Insomnia 2017    Melanoma ([facility] 2017    On statin therapy 2017    Osteopenia 2017    PUD (peptic ulcer disease)     Temporal arteritis ([facility] 2017    Vitamin D deficiency 2017    Zoster 2017      Past Surgical History:   Procedure Laterality Date    ABDOMEN SURGERY PROC UNLISTED      perforated ulcer    COLONOSCOPY,DIAGNOSTIC  3/17/2015         HX COLONOSCOPY      HX GYN      hysterectomy    HX GYN      laparoscopy     Allergies   Allergen Reactions    Augmentin [Amoxicillin-Pot Clavulanate] Unknown (comments)    Tetracycline Unknown (comments)      Family History   Problem Relation Age of Onset    Cancer Mother      uterine    Stroke Maternal Grandfather       Current Outpatient Prescriptions   Medication Sig    omeprazole (PRILOSEC) 20 mg capsule Take 1 Cap by mouth daily.     simvastatin (ZOCOR) 20 mg tablet TAKE 1 TABLET EVERY DAY    traZODone (DESYREL) 50 mg tablet Take  by mouth as needed.  CALCIUM CARBONATE/VITAMIN D3 (CALTRATE WITH VITAMIN D3 PO) Take  by mouth.  aspirin 81 mg tablet Take 81 mg by mouth.  cranberry 500 mg Cap Take  by mouth.  PYRIDOXINE HCL (VITAMIN B-6 PO) Take  by mouth.  MULTIVITAMIN W-MINERALS/LUTEIN (CENTRUM SILVER PO) Take  by mouth. No current facility-administered medications for this visit. Vitals:    06/01/18 1031 06/01/18 1044   BP: 128/80 124/80   Pulse: 87    Resp: 18    SpO2: 96%    Weight: 173 lb (78.5 kg)    Height: 5' 4\" (1.626 m)      Social History     Social History    Marital status:      Spouse name: N/A    Number of children: N/A    Years of education: N/A     Occupational History    Not on file. Social History Main Topics    Smoking status: Never Smoker    Smokeless tobacco: Never Used    Alcohol use Yes      Comment: rarely    Drug use: No    Sexual activity: Not on file     Other Topics Concern    Not on file     Social History Narrative       I have reviewed the nurses notes, vitals, problem list, allergy list, medical history, family, social history and medications. Review of Symptoms:    General: Pt denies excessive weight gain or loss. Pt is able to conduct ADL's  HEENT: Denies blurred vision, headaches, epistaxis and difficulty swallowing. Respiratory: Denies shortness of breath, BARON, wheezing or stridor. Cardiovascular: Denies precordial pain, palpitations, edema or PND  Gastrointestinal: Denies poor appetite, indigestion, abdominal pain or blood in stool  Musculoskeletal: Denies pain or swelling from muscles or joints  Neurologic: Denies tremor, paresthesias, or sensory motor disturbance  Skin: Denies rash, itching or texture change. Physical Exam:      General: Well developed, in no acute distress, cooperative and alert  HEENT: No carotid bruits, no JVD, trach is midline.  Neck Supple, PEERL, EOM intact. Heart:  Normal S1/S2 negative S3 or S4. Regular, no murmur, gallop or rub.   Respiratory: Clear bilaterally x 4, no wheezing or rales  Abdomen:   Soft, non-tender, no masses, bowel sounds are active.   Extremities:  No edema, normal cap refill, no cyanosis, atraumatic. Neuro: A&Ox3, speech clear, gait stable. Skin: Skin color is normal. No rashes or lesions. Non diaphoretic  Vascular: 2+ pulses symmetric in all extremities marked bilateral lower extremity varicosities nontender. Cardiographics    ECG: Normal sinus rhythm  Results for orders placed or performed during the hospital encounter of 07/11/17   EKG, 12 LEAD, INITIAL   Result Value Ref Range    Ventricular Rate 71 BPM    Atrial Rate 71 BPM    P-R Interval 164 ms    QRS Duration 78 ms    Q-T Interval 402 ms    QTC Calculation (Bezet) 436 ms    Calculated P Axis 58 degrees    Calculated R Axis 12 degrees    Calculated T Axis 27 degrees    Diagnosis       Normal sinus rhythm  Possible Left atrial enlargement  No previous ECGs available  Confirmed by Gretchen Jones ([id number]) on 7/12/2017 9:56:03 AM           Cardiology Labs:  No results found for: CHOL, CHOLX, CHLST, CHOLV, 587083, HDL, LDL, LDLC, DLDLP, TGLX, Cyndia Deeds, CH[facility], [facility]    Lab Results   Component Value Date/Time    Sodium 138 07/11/2017 03:37 PM    Potassium 3.9 07/11/2017 03:37 PM    Chloride 105 07/11/2017 03:37 PM    CO2 25 07/11/2017 03:37 PM    Anion gap 8 07/11/2017 03:37 PM    Glucose 102 (H) 07/11/2017 03:37 PM    BUN 14 07/11/2017 03:37 PM    Creatinine 0.86 07/11/2017 03:37 PM    BUN/Creatinine ratio 16 07/11/2017 03:37 PM    GFR est AA >60 07/11/2017 03:37 PM    GFR est non-AA >60 07/11/2017 03:37 PM    Calcium 9.0 07/11/2017 03:37 PM    Bilirubin, total 0.4 07/11/2017 03:37 PM    AST (SGOT) 15 07/11/2017 03:37 PM    Alk.  phosphatase 103 07/11/2017 03:37 PM    Protein, total 7.0 07/11/2017 03:37 PM    Albumin 3.8 07/11/2017 03:37 PM    Globulin 3.2 07/11/2017 03:37 PM    A-G Ratio 1.2 07/11/2017 03:37 PM    ALT (SGPT) 19 07/11/2017 03:37 PM           Assessment:     Assessment:     Diagnoses and all orders for this visit:    1. Pure hypercholesterolemia  -     AMB POC EKG ROUTINE W/ 12 LEADS, INTER & REP  -     CT HEART W/O CONT WITH CALCIUM; Future  -     2D ECHO COMPLETE ADULT (TTE) W OR WO CONTR    2. On statin therapy  -     CT HEART W/O CONT WITH CALCIUM; Future  -     2D ECHO COMPLETE ADULT (TTE) W OR WO CONTR        ICD-10-CM ICD-9-CM    1. Pure hypercholesterolemia E78.00 272.0 AMB POC EKG ROUTINE W/ 12 LEADS, INTER & REP      CT HEART W/O CONT WITH CALCIUM      2D ECHO COMPLETE ADULT (TTE) W OR WO CONTR   2. On statin therapy Z79.899 V58.69 CT HEART W/O CONT WITH CALCIUM      2D ECHO COMPLETE ADULT (TTE) W OR WO CONTR     Orders Placed This Encounter    CT HEART W/O CONT WITH CALCIUM     Standing Status:   Future     Standing Expiration Date:   7/1/2019     Order Specific Question:   Is Patient Allergic to Contrast Dye? Answer:   No    AMB POC EKG ROUTINE W/ 12 LEADS, INTER & REP     Order Specific Question:   Reason for Exam:     Answer:   ROUTINE    2D ECHO COMPLETE ADULT (TTE) W OR WO CONTR     Order Specific Question:   Reason for Exam:     Answer:   Hx high cholesterol     Order Specific Question:   Contrast Enhancement (Bubble Study, Definity, Optison) may be used if criteria listed in established evidence-based protocol has been identified. Answer:   Yes        Plan:     Patient is a 69-year-old female with a history of hyperlipidemia family history of CVA presenting for cardiac evaluation and risk factor modifications. Will evaluate cardiac structure with echocardiogram and coronary CT calcium score. No follow-up needed if workup is normal.    Fabian Lopez NP    This note was created using voice recognition software. Despite editing, there may be syntax errors.        [facility]    6/1/2018         Patient seen, examined by me personally. Plan discussed as detailed. Agree with note as outlined by  NP. I confirm findings in history and physical exam. No additional findings noted. Agree with plan as outlined above.      Marylee Franklin, MD

## 2018-06-01 NOTE — PROGRESS NOTES
1. Have you been to the ER, urgent care clinic since your last visit? Hospitalized since your last visit? YES ER IN April IN [place]. 2. Have you seen or consulted any other health care providers outside of the [address] [place] [address] since your last visit? Include any pap smears or colon screening. NO    NEW PATIENT. NO CARDIAC C/O.

## 2018-06-01 NOTE — MR AVS SNAPSHOT
Chandler Collins [facility] 
[phone] Patient: Benny Hendrix MRN: [id number] QFP:8/48/9161 Visit Information Date & Time Provider Department Dept. Phone Encounter #  
 6/1/2018 10:30 AM Jelena Amezcua MD [facility] [phone] [id number] Your Appointments 6/5/2018 12:30 PM  
ECHO CARDIOGRAMS 2D with ECHO, Ethan [address] [place] [address]) Appt Note: Dr BRIONES 2D ECHO COMPLETE ADULT (TTE) W OR WO CONTR Osvaldo Cornel (Order [id number]) ,[facility]  
[phone] [address] [facility]  
  
    
 11/8/2018  1:20 PM  
Follow Up with MD Roselia Salinas [facility] 26 ([address]) Appt Note: [address] [phone] [address] [facility] [address] Upcoming Health Maintenance Date Due DTaP/Tdap/Td series (1 - Tdap) 5/26/1965 FOBT Q 1 YEAR AGE 50-75 5/26/1994 ZOSTER VACCINE AGE 60> 3/26/2004 GLAUCOMA SCREENING Q2Y 5/26/2009 Pneumococcal 65+ High/Highest Risk (1 of 2 - PCV13) 5/26/2009 Influenza Age 5 to Adult 8/1/2018 MEDICARE YEARLY EXAM 5/8/2019 BREAST CANCER SCRN MAMMOGRAM 11/18/2019 Allergies as of 6/1/2018  Review Complete On: 6/1/2018 By: Quynh Quintero NP Severity Noted Reaction Type Reactions Augmentin [Amoxicillin-pot Clavulanate]  09/26/2017    Unknown (comments) Tetracycline  10/03/2011    Unknown (comments) Current Immunizations  Never Reviewed Name Date Influenza High Dose Vaccine PF 11/2/2017 Not reviewed this visit You Were Diagnosed With   
  
 Codes Comments Pure hypercholesterolemia    -  Primary ICD-10-CM: E78.00 ICD-9-CM: 272.0 On statin therapy     ICD-10-CM: Z79.899 ICD-9-CM: V58.69 Vitals BP Pulse Resp Height(growth percentile) Weight(growth percentile) SpO2  
 124/80 (BP 1 Location: Right arm, BP Patient Position: Sitting) 87 18 5' 4\" (1.626 m) 173 lb (78.5 kg) 96% BMI OB Status Smoking Status 29.7 kg/m2 Hysterectomy Never Smoker Vitals History BMI and BSA Data Body Mass Index Body Surface Area  
 29.7 kg/m 2 1.88 m 2 Preferred Pharmacy Pharmacy Name Phone [facility] [address] [place] [phone] [facility] [address] [place] [address] [phone] Your Updated Medication List  
  
   
This list is accurate as of 6/1/18 11:34 AM.  Always use your most recent med list.  
  
  
  
  
 aspirin 81 mg tablet Take 81 mg by mouth. CALTRATE WITH VITAMIN D3 PO Take  by mouth. CENTRUM SILVER PO Take  by mouth.  
  
 cranberry 500 mg capsule Take  by mouth. omeprazole 20 mg capsule Commonly known as:  PRILOSEC Take 1 Cap by mouth daily. simvastatin 20 mg tablet Commonly known as:  ZOCOR  
TAKE 1 TABLET EVERY DAY  
  
 traZODone 50 mg tablet Commonly known as:  Jennifer Hand Take  by mouth as needed. VITAMIN B-6 PO Take  by mouth. We Performed the Following 2D ECHO COMPLETE ADULT (TTE) W OR WO CONTR [08741 CPT(R)] AMB POC EKG ROUTINE W/ 12 LEADS, INTER & REP [18012 CPT(R)] To-Do List   
 06/04/2018 Imaging:  CT HEART W/O CONT WITH CALCIUM Please provide this summary of care documentation to your next provider. Your primary care clinician is listed as TORSTEN Brian. If you have any questions after today's visit, please call [phone].

## 2018-06-05 ENCOUNTER — CLINICAL SUPPORT (OUTPATIENT)
Dept: CARDIOLOGY CLINIC | Age: 73
End: 2018-06-05

## 2018-06-05 DIAGNOSIS — E78.2 MIXED HYPERLIPIDEMIA: ICD-10-CM

## 2018-06-12 ENCOUNTER — HOSPITAL ENCOUNTER (OUTPATIENT)
Dept: CT IMAGING | Age: 73
Discharge: HOME | End: 2018-06-12
Attending: NURSE PRACTITIONER
Payer: SELF-PAY

## 2018-06-12 DIAGNOSIS — Z79.899 ON STATIN THERAPY: ICD-10-CM

## 2018-06-12 DIAGNOSIS — E78.00 PURE HYPERCHOLESTEROLEMIA: ICD-10-CM

## 2018-06-12 PROCEDURE — 75571 CT HRT W/O DYE W/CA TEST: CPT

## 2018-06-13 NOTE — PROGRESS NOTES
Verified patient with two identifiers. Spoke with patient regarding CORONARY CALCIUM SCORING  test results.

## 2018-08-07 ENCOUNTER — ANESTHESIA EVENT (OUTPATIENT)
Dept: ENDOSCOPY | Age: 73
End: 2018-08-07
Payer: MEDICARE

## 2018-08-07 RX ORDER — CHOLECALCIFEROL (VITAMIN D3) 125 MCG
10 CAPSULE ORAL
COMMUNITY

## 2018-08-07 NOTE — ANESTHESIA PREPROCEDURE EVALUATION
Anesthetic History   No history of anesthetic complications            Review of Systems / Medical History  Patient summary reviewed, nursing notes reviewed and pertinent labs reviewed    Pulmonary  Within defined limits                 Neuro/Psych   Within defined limits           Cardiovascular              Hyperlipidemia  Pertinent negatives: No hypertension (Pt denies, no meds)  Exercise tolerance: >4 METS  Comments: 6-2018 EKG: KAYKAY RAMIREZ    6-2018 ECHO: 55-60% EF   GI/Hepatic/Renal     GERD: well controlled      PUD    Comments: Ischemic colitis, GERD      IBS Endo/Other        Arthritis and cancer (BCCA, SCCa, melanoma)     Other Findings   Comments: Osteopenia  Temporal arteritis  Zoster         Physical Exam    Airway  Mallampati: II  TM Distance: 4 - 6 cm  Neck ROM: normal range of motion   Mouth opening: Normal     Cardiovascular  Regular rate and rhythm,  S1 and S2 normal,  no murmur, click, rub, or gallop  Rhythm: regular  Rate: normal         Dental    Dentition: Bridges and Caps/crowns     Pulmonary  Breath sounds clear to auscultation               Abdominal  GI exam deferred       Other Findings            Anesthetic Plan    ASA: 2  Anesthesia type: total IV anesthesia          Induction: Intravenous  Anesthetic plan and risks discussed with: Patient

## 2018-08-08 ENCOUNTER — HOSPITAL ENCOUNTER (OUTPATIENT)
Age: 73
Setting detail: OUTPATIENT SURGERY
Discharge: HOME | End: 2018-08-08
Attending: INTERNAL MEDICINE | Admitting: INTERNAL MEDICINE
Payer: MEDICARE

## 2018-08-08 ENCOUNTER — ANESTHESIA (OUTPATIENT)
Dept: ENDOSCOPY | Age: 73
End: 2018-08-08
Payer: MEDICARE

## 2018-08-08 VITALS
HEART RATE: 65 BPM | RESPIRATION RATE: 11 BRPM | SYSTOLIC BLOOD PRESSURE: 154 MMHG | DIASTOLIC BLOOD PRESSURE: 77 MMHG | HEIGHT: 65 IN | BODY MASS INDEX: 28.55 KG/M2 | OXYGEN SATURATION: 100 % | WEIGHT: 171.38 LBS | TEMPERATURE: 97.4 F

## 2018-08-08 LAB
H PYLORI FROM TISSUE: NEGATIVE
KIT LOT NO., HCLOLOT: NORMAL
NEGATIVE CONTROL: NEGATIVE
POSITIVE CONTROL: POSITIVE

## 2018-08-08 PROCEDURE — 74011250636 HC RX REV CODE- 250/636: Performed by: INTERNAL MEDICINE

## 2018-08-08 PROCEDURE — 76040000007: Performed by: INTERNAL MEDICINE

## 2018-08-08 PROCEDURE — 88305 TISSUE EXAM BY PATHOLOGIST: CPT | Performed by: INTERNAL MEDICINE

## 2018-08-08 PROCEDURE — 74011250636 HC RX REV CODE- 250/636

## 2018-08-08 PROCEDURE — 76060000032 HC ANESTHESIA 0.5 TO 1 HR: Performed by: INTERNAL MEDICINE

## 2018-08-08 PROCEDURE — 87077 CULTURE AEROBIC IDENTIFY: CPT | Performed by: INTERNAL MEDICINE

## 2018-08-08 PROCEDURE — 74011250637 HC RX REV CODE- 250/637: Performed by: INTERNAL MEDICINE

## 2018-08-08 PROCEDURE — 77030019988 HC FCPS ENDOSC DISP BSC -B: Performed by: INTERNAL MEDICINE

## 2018-08-08 RX ORDER — FLUMAZENIL 0.1 MG/ML
0.2 INJECTION INTRAVENOUS
Status: DISCONTINUED | OUTPATIENT
Start: 2018-08-08 | End: 2018-08-08 | Stop reason: HOSPADM

## 2018-08-08 RX ORDER — ATROPINE SULFATE 0.1 MG/ML
0.5 INJECTION INTRAVENOUS
Status: DISCONTINUED | OUTPATIENT
Start: 2018-08-08 | End: 2018-08-08 | Stop reason: HOSPADM

## 2018-08-08 RX ORDER — DEXTROMETHORPHAN/PSEUDOEPHED 2.5-7.5/.8
1.2 DROPS ORAL
Status: DISCONTINUED | OUTPATIENT
Start: 2018-08-08 | End: 2018-08-08 | Stop reason: HOSPADM

## 2018-08-08 RX ORDER — SODIUM CHLORIDE 9 MG/ML
100 INJECTION, SOLUTION INTRAVENOUS CONTINUOUS
Status: DISCONTINUED | OUTPATIENT
Start: 2018-08-08 | End: 2018-08-08 | Stop reason: HOSPADM

## 2018-08-08 RX ORDER — SODIUM CHLORIDE 0.9 % (FLUSH) 0.9 %
5-10 SYRINGE (ML) INJECTION EVERY 8 HOURS
Status: DISCONTINUED | OUTPATIENT
Start: 2018-08-08 | End: 2018-08-08 | Stop reason: HOSPADM

## 2018-08-08 RX ORDER — LIDOCAINE HYDROCHLORIDE 20 MG/ML
INJECTION, SOLUTION EPIDURAL; INFILTRATION; INTRACAUDAL; PERINEURAL AS NEEDED
Status: DISCONTINUED | OUTPATIENT
Start: 2018-08-08 | End: 2018-08-08 | Stop reason: HOSPADM

## 2018-08-08 RX ORDER — SODIUM CHLORIDE 0.9 % (FLUSH) 0.9 %
5-10 SYRINGE (ML) INJECTION AS NEEDED
Status: DISCONTINUED | OUTPATIENT
Start: 2018-08-08 | End: 2018-08-08 | Stop reason: HOSPADM

## 2018-08-08 RX ORDER — EPINEPHRINE 0.1 MG/ML
1 INJECTION INTRACARDIAC; INTRAVENOUS
Status: DISCONTINUED | OUTPATIENT
Start: 2018-08-08 | End: 2018-08-08 | Stop reason: HOSPADM

## 2018-08-08 RX ORDER — PROPOFOL 10 MG/ML
INJECTION, EMULSION INTRAVENOUS AS NEEDED
Status: DISCONTINUED | OUTPATIENT
Start: 2018-08-08 | End: 2018-08-08 | Stop reason: HOSPADM

## 2018-08-08 RX ORDER — MIDAZOLAM HYDROCHLORIDE 1 MG/ML
1-2 INJECTION, SOLUTION INTRAMUSCULAR; INTRAVENOUS
Status: DISCONTINUED | OUTPATIENT
Start: 2018-08-08 | End: 2018-08-08 | Stop reason: HOSPADM

## 2018-08-08 RX ADMIN — SIMETHICONE 80 MG: 20 SUSPENSION/ DROPS ORAL at 09:01

## 2018-08-08 RX ADMIN — PROPOFOL 540 MG: 10 INJECTION, EMULSION INTRAVENOUS at 09:04

## 2018-08-08 RX ADMIN — SODIUM CHLORIDE 100 ML/HR: 900 INJECTION, SOLUTION INTRAVENOUS at 08:17

## 2018-08-08 RX ADMIN — LIDOCAINE HYDROCHLORIDE 100 MG: 20 INJECTION, SOLUTION EPIDURAL; INFILTRATION; INTRACAUDAL; PERINEURAL at 08:29

## 2018-08-08 NOTE — ANESTHESIA POSTPROCEDURE EVALUATION
Post-Anesthesia Evaluation and Assessment    Patient: Grace Celestin MRN: [id number]  SSN: [id number]    YOB: 1945  Age: 75 y.o. Sex: female       Cardiovascular Function/Vital Signs  Visit Vitals    /71    Pulse 70    Temp 36.9 °C (98.5 °F)    Resp 21    Ht 5' 5\" (1.651 m)    Wt 77.7 kg (171 lb 6 oz)    SpO2 98%    Breastfeeding No    BMI 28.52 kg/m2       Patient is status post total IV anesthesia anesthesia for Procedure(s):  ESOPHAGOGASTRODUODENAL (EGD) BIOPSY  COLONOSCOPY  ESOPHAGOGASTRODUODENOSCOPY (EGD). Nausea/Vomiting: None    Postoperative hydration reviewed and adequate. Pain:  Pain Scale 1: Numeric (0 - 10) (08/08/18 0825)  Pain Intensity 1: 0 (08/08/18 0825)   Managed    Neurological Status: At baseline    Mental Status and Level of Consciousness: Arousable    Pulmonary Status:   O2 Device: CO2 nasal cannula (08/08/18 0905)   Adequate oxygenation and airway patent    Complications related to anesthesia: None    Post-anesthesia assessment completed.  No concerns    Signed By: Aysha Vicente MD     August 8, 2018

## 2018-08-08 NOTE — H&P
Sudhakar Acosta MD  [facility] [address] [facility]  [phone]  [email or web address]      Gastroenterology Outpatient History and Physical    Patient: Ro Etienne    Physician: Tamia Herman MD    Vital Signs: Blood pressure (P) 141/81, pulse (P) 79, temperature 98.5 °F (36.9 °C), resp. rate (P) 16, height 5' 5\" (1.651 m), weight 77.7 kg (171 lb 6 oz), SpO2 (P) 99 %, not currently breastfeeding. Allergies: Allergies   Allergen Reactions    Augmentin [Amoxicillin-Pot Clavulanate] Unknown (comments)     \"hard on my stomach\"    Tetracycline Unknown (comments)     \"funny feeling in my mouth. ..years ago\"       Chief Complaint: reflux, diarrhea    History of Present Illness: Here for EGD for chronic GERD and colonoscopy due to hx of bloody diarrhea. See OV note for details.       History:  Past Medical History:   Diagnosis Date    Arthritis     hands    Cancer ([address]     basal cell of face, squamous, and melanoma    Colitis 04/21/2018    [facility]     Colitis, ischemic [facility]) 9/26/2017    Duodenal ulcer, perforated ([facility] 9/26/2017    Dyspepsia and other specified disorders of function of stomach     GERD (gastroesophageal reflux disease)     History of rectal bleeding 04/2018    Two episodes; first one 19 years ago - labeled as a \"possible ischemic attack\"    Hypercholesterolemia     Hyperlipidemia 9/26/2017    Hypertension     patient denies/no meds    IBS (irritable bowel syndrome) 9/26/2017    IGT (impaired glucose tolerance) 09/26/2017    Borderline Hgb A1C    Insomnia 9/26/2017    Melanoma ([facility] 9/26/2017    On statin therapy 9/26/2017    Osteopenia 9/26/2017    PUD (peptic ulcer disease)     Temporal arteritis ([facility] 9/26/2017    Vitamin D deficiency 9/26/2017    Zoster 9/26/2017      Past Surgical History:   Procedure Laterality Date   [address] perforated ulcer    COLONOSCOPY,DIAGNOSTIC  3/17/2015         HX COLONOSCOPY      HX ENDOSCOPY      HX GYN  1984    hysterectomy    HX GYN  1980's    laparoscopy    HX TONSILLECTOMY        Social History     Social History    Marital status:      Spouse name: N/A    Number of children: N/A    Years of education: N/A     Social History Main Topics    Smoking status: Never Smoker    Smokeless tobacco: Never Used    Alcohol use Yes      Comment: rarely    Drug use: No    Sexual activity: Not Asked     Other Topics Concern    None     Social History Narrative      Family History   Problem Relation Age of Onset    Cancer Mother      uterine    Stroke Maternal Grandfather       Patient Active Problem List   Diagnosis Code    Abdominal pain R10.9    Gallstones K80.20    Hyperlipidemia E78.5    Osteopenia M85.80    Zoster B02.9    Melanoma ([address] C43.9    Temporal arteritis ([address] M31.6    On statin therapy Z79.899    Vitamin D deficiency E55.9    IGT (impaired glucose tolerance) R73.02    IBS (irritable bowel syndrome) K58.9    Duodenal ulcer, perforated ([address] K26.5    Colitis, ischemic ([facility] K55.9    Insomnia G47.00    Back pain M54.9         Medications:   Prior to Admission medications    Medication Sig Start Date End Date Taking? Authorizing Provider   melatonin tab tablet Take 5 mg by mouth nightly. Yes Historical Provider   omeprazole (PRILOSEC) 20 mg capsule Take 1 Cap by mouth daily. 4/9/18  Yes Elian Romero MD   simvastatin (ZOCOR) 20 mg tablet TAKE 1 TABLET EVERY DAY  Patient taking differently: TAKE 1 TABLET AT BEDTIME 3/5/18  Yes Elian Romero MD   aspirin 81 mg tablet Take 81 mg by mouth. Yes Historical Provider   cranberry 500 mg Cap Take 500 mg by mouth daily. Yes Historical Provider   CALCIUM CARBONATE/VITAMIN D3 (CALTRATE WITH VITAMIN D3 PO) Take 2 Tabs by mouth daily.     Historical Provider       Physical Exam:     General: well developed, well nourished   HEENT: unremarkable   Heart: regular rhythm no mumur    Lungs: clear   Abdominal:  benign   Neurological: unremarkable   Extremities: no edema     Findings/Diagnosis: GERD, Hx of colitis    Plan of Care/Planned Procedure: EGD and colonoscopy with  monitored anesthesia care sedation       Signed:  Sera Larsen MD 8/8/2018

## 2018-08-08 NOTE — ROUTINE PROCESS
Len Alvarez  1945  [id number]    Situation:  Verbal report received from: Td Jennings RN  Procedure: Procedure(s):  ESOPHAGOGASTRODUODENAL (EGD) BIOPSY  COLONOSCOPY  ESOPHAGOGASTRODUODENOSCOPY (EGD)    Background:    Preoperative diagnosis: ISCHEMIC COLITIS,GERD  Postoperative diagnosis: esophagitis; diverticulosis    :  Dr. Cindi Herron  Assistant(s): Endoscopy RN-1: Nadia George    Specimens:   ID Type Source Tests Collected by Time Destination   1 : gastroesophageal (GE) junction biopsy for pathology Preservative GE Junction  Chantel Taylor MD 8/8/2018 1165 Pathology     H. Pylori  yes    Assessment:  Intra-procedure medications       Anesthesia gave intra-procedure sedation and medications, see anesthesia flow sheet yes    Intravenous fluids: NS@ KVO     Vital signs stable       Abdominal assessment: round and soft       Recommendation:  Discharge patient per MD order  .     Family or Friend Daughter Shelbie Hernandezo to share finding with family or friend yes

## 2018-08-08 NOTE — PROCEDURES
[facility]                   Endoscopic Gastroduodenoscopy Procedure Note      8/8/2018  Rakel Jones  1945  [id number]    Procedure: Endoscopic Gastroduodenoscopy with biopsy    Indication:  GERD     Pre-operative Diagnosis: see indication above    Post-operative Diagnosis: see findings below    : JENNIFER James MD    Referring Provider:  Janessa June MD      Anesthesia/Sedation:  MAC anesthesia Propofol    Airway assessment: No airway problems anticipated    Pre-Procedural Exam:      Airway: clear, no airway problems anticipated  Heart: RRR, without gallops or rubs  Lungs: clear bilaterally without wheezes, crackles, or rhonchi  Abdomen: soft, nontender, nondistended, bowel sounds present  Mental Status: awake, alert and oriented to person, place and time       Procedure Details     After infomed consent was obtained for the procedure, with all risks and benefits of procedure explained the patient was taken to the endoscopy suite and placed in the left lateral decubitus position. Following sequential administration of sedation as per above, the endoscope was inserted into the mouth and advanced under direct vision to second portion of the duodenum. A careful inspection was made as the gastroscope was withdrawn, including a retroflexed view of the proximal stomach; findings and interventions are described below. Findings:   Esophagus: Mild erythema at the GE junction, biopsied. Small hiatal hernia. Stomach: normal , biopsied for pyloritek  Duodenum: normal    Therapies:  biopsy of esophagus    Specimens: biopsies of GE junction           Complications:   None; patient tolerated the procedure well. EBL:  None. Impression:      -Mild esophagitis, otherwise normal    Recommendations:  -Continue acid suppression. , -Await pathology. , -Await pyloritek test result and treat for Helicobacter pylori if positive.     Aziza Mata Janna Castrejon MD8/8/2018

## 2018-08-08 NOTE — IP AVS SNAPSHOT
[address] [place] [address] 
[phone] Patient: Eros Quach MRN: [id number] UOV:5/45/7192 About your hospitalization You were admitted on:  August 8, 2018 You last received care in the:  [facility] ENDOSCOPY You were discharged on:  August 8, 2018 Why you were hospitalized Your primary diagnosis was:  Not on File Follow-up Information Follow up With Details Comments Contact Info MD Sang Rose [address] 
[phone] Discharge Orders None A check lio indicates which time of day the medication should be taken. My Medications CHANGE how you take these medications Instructions Each Dose to Equal  
 Morning Noon Evening Bedtime  
 simvastatin 20 mg tablet Commonly known as:  ZOCOR What changed:  See the new instructions. Your last dose was: Your next dose is: TAKE 1 TABLET EVERY DAY  
     
   
   
   
  
  
CONTINUE taking these medications Instructions Each Dose to Equal  
 Morning Noon Evening Bedtime  
 aspirin 81 mg tablet Your last dose was: Your next dose is: Take 81 mg by mouth. 81 mg CALTRATE WITH VITAMIN D3 PO Your last dose was: Your next dose is: Take 2 Tabs by mouth daily. 2 Tab  
    
   
   
   
  
 cranberry 500 mg capsule Your last dose was: Your next dose is: Take 500 mg by mouth daily. 500 mg  
    
   
   
   
  
 melatonin Tab tablet Your last dose was: Your next dose is: Take 5 mg by mouth nightly. 5 mg  
    
   
   
   
  
 omeprazole 20 mg capsule Commonly known as:  PRILOSEC Your last dose was: Your next dose is: Take 1 Cap by mouth daily.   
 20 mg  
    
   
   
   
  
  
  
  
 Discharge Instructions Victoriano Bautista MD 
[facility], [address] [facility] [place], [address] [place] 
[phone] 
[email or web address] [address] [address] 
[id number] 1945 EGD DISCHARGE INSTRUCTIONS Discomfort: 
Sore throat- throat lozenges or warm salt water gargle 
redness at IV site- apply warm compress to area; if redness or soreness persist- contact your physician Gaseous discomfort- walking, belching will help relieve any discomfort You may not operate a vehicle for 12 hours You may not engage in an occupation involving machinery or appliances for rest of today You may not drink alcoholic beverages for at least 12 hours Avoid making any critical decisions for at least 24 hour DIET You may have anything by mouth. You may eat and drink immediately. You may resume your regular diet  however -  remember your colon is empty and a heavy meal will produce gas. Avoid these foods:  vegetables, fried / greasy foods, carbonated drinks ACTIVITY You may resume your normal daily activities Spend the remainder of the day resting -  avoid any strenuous activity. CALL M.D. ANY SIGN OF Increasing pain, nausea, vomiting Abdominal distension (swelling) New increased bleeding (oral or rectal) Fever (chills) Pain in chest area Bloody discharge from nose or mouth Shortness of breath Follow-up Instructions: 
 Call Dr. Victoriano Bautista for any questions or problems. Telephone # [phone] Dr. Montoya Line office will notify you of the biopsy results available  Within 7 to 10 days. We will call you or send a letter Continue same medications. ENDOSCOPY FINDINGS: 
 Your endoscopy showed mild esophagitis. DISCHARGE SUMMARY from Nurse The following personal items collected during your admission are returned to you:  
Dental Appliance: Dental Appliances: None Vision: Visual Aid: Glasses Hearing Aid:   
Jewelry: Clothing:   
Other Valuables:   
Valuables sent to safe:    
 
COLON DISCHARGE INSTRUCTIONS Discomfort: 
Redness at IV site- apply warm compress to area; if redness or soreness persist- contact your physician There may be a slight amount of blood passed from the rectum Gaseous discomfort- walking, belching will help relieve any discomfort You may not operate a vehicle for 12 hours You may not engage in an occupation involving machinery or appliances for rest of today You may not drink alcoholic beverages for at least 12 hours Avoid making any critical decisions for at least 24 hour DIET: 
 High fiber diet.  however -  remember your colon is empty and a heavy meal will produce gas. Avoid these foods:  vegetables, fried / greasy foods, carbonated drinks for today ACTIVITY: 
You may resume your normal daily activities it is recommended that you spend the remainder of the day resting -  avoid any strenuous activity. CALL M.D. ANY SIGN OF: Increasing pain, nausea, vomiting Abdominal distension (swelling) New increased bleeding (oral or rectal) Fever (chills) Pain in chest area Bloody discharge from nose or mouth Shortness of breath COLONOSCOPY FINDINGS: 
Your colonoscopy showed: severe diverticulosis with narrowing of the colon. Follow-up Instructions: 
 Call Dr. Albertina Cortez if any questions or problems. Telephone # [phone] ACO Transitions of Care Introducing "[employer] offers a voluntary care coordination program to provide high quality service and care to [facility] fee-for-service beneficiaries. Miki Hayden was designed to help you enhance your health and well-being through the following services: ? Transitions of Care  support for individuals who are transitioning from one care setting to another (example: Hospital to home). ? Chronic and Complex Care Coordination  support for individuals and caregivers of those with serious or chronic illnesses or with more than one chronic (ongoing) condition and those who take a number of different medications. If you meet specific medical criteria, a [address] [place] [address] Rd may call you directly to coordinate your care with your primary care physician and your other care providers. For questions about the [facility] programs, please, contact your physicians office. For general questions or additional information about Accountable Care Organizations: 
Please visit www.medicare.gov/acos. html or call 1-800-MEDICARE (0-439.731.5835) TTY users should call 6-178.160.1444. Introducing [employer]ch As a [employer] patient, I wanted to make you aware of our electronic visit tool called Cuco Rausch. [employer] 24/7 allows you to connect within minutes with a medical provider 24 hours a day, seven days a week via a mobile device or tablet or logging into a secure website from your computer. You can access Cuco Rausch from anywhere in the [place]. A virtual visit might be right for you when you have a simple condition and feel like you just dont want to get out of bed, or cant get away from work for an appointment, when your regular [employer] [employer] provider is not available (evenings, weekends or holidays), or when youre out of town and need minor care. Electronic visits cost only $49 and if the [employer] 24/7 provider determines a prescription is needed to treat your condition, one can be electronically transmitted to a nearby pharmacy*. Please take a moment to enroll today if you have not already done so. The enrollment process is free and takes just a few minutes. To enroll, please download the [employer] 24/7 angelika to your tablet or phone, or visit [email or web address] to enroll on your computer. And, as an [address] [place] [address] patient with a [employer] [facility] account, the results of your visits will be scanned into your electronic medical record and your primary care provider will be able to view the scanned results. We urge you to continue to see your regular [employer] provider for your ongoing medical care. And while your primary care provider may not be the one available when you seek a [employer] virtual visit, the peace of mind you get from getting a real diagnosis real time can be priceless. For more information on [employer], view our Frequently Asked Questions (FAQs) at [email or web address]. Sincerely, 
 
Ifrah Badillo MD 
Chief Medical Officer [address] *:  certain medications cannot be prescribed via [employer] Providers Seen During Your Hospitalization Provider Specialty Primary office phone Sera Larsen MD Gastroenterology [phone] Your Primary Care Physician (PCP) Primary Care Physician Office Phone Office Fax Ladene Stager  You are allergic to the following Allergen Reactions Augmentin (Amoxicillin-Pot Clavulanate) Unknown (comments) \"hard on my stomach\" Tetracycline Unknown (comments) \"funny feeling in my mouth. ..years ago\" Recent Documentation Height Weight Breastfeeding? BMI OB Status Smoking Status 1.651 m 77.7 kg No 28.52 kg/m2 Hysterectomy Never Smoker Emergency Contacts Name Discharge Info Relation Home Work Mobile Monroe Rowland DISCHARGE CAREGIVER [3] Spouse [3] [phone] Mariela Rowland DISCHARGE CAREGIVER [3] Child [2] [phone] Patient Belongings The following personal items are in your possession at time of discharge: 
  Dental Appliances: None  Visual Aid: Glasses Please provide this summary of care documentation to your next provider. Signatures-by signing, you are acknowledging that this After Visit Summary has been reviewed with you and you have received a copy. Patient Signature:  ____________________________________________________________ Date:  ____________________________________________________________  
  
Marcelle Feller Provider Signature:  ____________________________________________________________ Date:  ____________________________________________________________

## 2018-08-08 NOTE — PROCEDURES
[facility]                  Colonoscopy Operative Report    8/8/2018      Gumaro Reyez  [id number]  1945    Procedure Type:   Colonoscopy --diagnostic     Indications:  history of acute ischemic colitis     Pre-operative Diagnosis: see indication above    Post-operative Diagnosis:  See findings below    :  Ti Figueroa MD    Referring Provider: María Bosch MD      Sedation:  MAC anesthesia Propofol    Pre-Procedural Exam:      Airway: clear,  No airway problems anticipated  Heart: RRR, without gallops or rubs  Lungs: clear bilaterally without wheezes, crackles, or rhonchi  Abdomen: soft, nontender, nondistended, bowel sounds present  Mental Status: awake, alert and oriented to person, place and time     Procedure Details:  After informed consent was obtained with all risks and benefits of procedure explained and preoperative exam completed, the patient was taken to the endoscopy suite and placed in the left lateral decubitus position. Upon sequential sedation as per above, a digital rectal exam was performed . The Olympus videocolonoscope  was inserted in the rectum and carefully advanced to the cecum, which was identified by the ileocecal valve and appendiceal orifice. The cecum was identified by the ileocecal valve and appendiceal orifice. The quality of preparation was adequate. The colonoscope was slowly withdrawn with careful evaluation between folds. Retroflexion in the rectum was completed demonstrating internal hemorrhoids. Sigmoid very narrowed and angulated, had to change to EGD scope to pass beyond sigmoid. Findings:   Rectum: Grade 1 internal hemorrhoid(s); Sigmoid: Severe diverticulosis with luminal narrowing and angulation  Descending Colon:     - Diverticulosis  Transverse Colon: normal  Ascending Colon: normal  Cecum: normal  Terminal Ileum: not intubated      Specimen Removed:  none    Complications: None.      EBL: None.    Impression:    Severe left sided diverticulosis with luminal narrowing and  angulation. Recommendations: --Follow up with primary care physician. High fiber diet. Resume normal medication(s). No repeat screening colonoscopy indicated due to age. Discharge Disposition:  Home in the company of a  when able to ambulate. Mateo Call MD    8/8/2018     JENNIFER Elizabeth MD  [facility], [address] [place] [address]  [phone]  [email or web address]

## 2018-08-08 NOTE — DISCHARGE INSTRUCTIONS
Melody Bagley MD  [facility], [address] [facility]  [phone]  [email or web address] Willernie Amadeo  [id number]  1945    EGD DISCHARGE INSTRUCTIONS    Discomfort:  Sore throat- throat lozenges or warm salt water gargle  redness at IV site- apply warm compress to area; if redness or soreness persist- contact your physician  Gaseous discomfort- walking, belching will help relieve any discomfort  You may not operate a vehicle for 12 hours  You may not engage in an occupation involving machinery or appliances for rest of today  You may not drink alcoholic beverages for at least 12 hours  Avoid making any critical decisions for at least 24 hour  DIET  You may have anything by mouth. You may eat and drink immediately. You may resume your regular diet - however -  remember your colon is empty and a heavy meal will produce gas. Avoid these foods:  vegetables, fried / greasy foods, carbonated drinks      ACTIVITY  You may resume your normal daily activities   Spend the remainder of the day resting -  avoid any strenuous activity. CALL M.D. ANY SIGN OF   Increasing pain, nausea, vomiting  Abdominal distension (swelling)  New increased bleeding (oral or rectal)  Fever (chills)  Pain in chest area  Bloody discharge from nose or mouth  Shortness of breath    Follow-up Instructions:   Call Dr. Melody Bagley for any questions or problems. Telephone # [phone]  Dr. Sondra Montero office will notify you of the biopsy results available  Within 7 to 10 days. We will call you or send a letter   Continue same medications. ENDOSCOPY FINDINGS:   Your endoscopy showed mild esophagitis.       DISCHARGE SUMMARY from Nurse    The following personal items collected during your admission are returned to you:   Dental Appliance: Dental Appliances: None  Vision: Visual Aid: Glasses  Hearing Aid:    Jewelry:    Clothing:    Other Valuables: Valuables sent to safe:       COLON DISCHARGE INSTRUCTIONS  Discomfort:  Redness at IV site- apply warm compress to area; if redness or soreness persist- contact your physician  There may be a slight amount of blood passed from the rectum  Gaseous discomfort- walking, belching will help relieve any discomfort  You may not operate a vehicle for 12 hours  You may not engage in an occupation involving machinery or appliances for rest of today  You may not drink alcoholic beverages for at least 12 hours  Avoid making any critical decisions for at least 24 hour  DIET:   High fiber diet. - however -  remember your colon is empty and a heavy meal will produce gas. Avoid these foods:  vegetables, fried / greasy foods, carbonated drinks for today      ACTIVITY:  You may resume your normal daily activities it is recommended that you spend the remainder of the day resting -  avoid any strenuous activity. CALL M.D. ANY SIGN OF:   Increasing pain, nausea, vomiting  Abdominal distension (swelling)  New increased bleeding (oral or rectal)  Fever (chills)  Pain in chest area  Bloody discharge from nose or mouth  Shortness of breath     COLONOSCOPY FINDINGS:  Your colonoscopy showed: severe diverticulosis with narrowing of the colon. Follow-up Instructions:   Call Dr. Ryne Ramsay if any questions or problems.    Telephone # [phone]

## 2018-10-25 ENCOUNTER — OFFICE VISIT (OUTPATIENT)
Dept: INTERNAL MEDICINE CLINIC | Age: 73
End: 2018-10-25

## 2018-10-25 VITALS
DIASTOLIC BLOOD PRESSURE: 84 MMHG | TEMPERATURE: 98.8 F | OXYGEN SATURATION: 97 % | SYSTOLIC BLOOD PRESSURE: 147 MMHG | WEIGHT: 177 LBS | HEIGHT: 65 IN | HEART RATE: 89 BPM | BODY MASS INDEX: 29.49 KG/M2

## 2018-10-25 DIAGNOSIS — J20.9 ACUTE BRONCHITIS, UNSPECIFIED ORGANISM: Primary | ICD-10-CM

## 2018-10-25 RX ORDER — METHYLPREDNISOLONE 4 MG/1
4 TABLET ORAL
Qty: 1 DOSE PACK | Refills: 0 | Status: SHIPPED | OUTPATIENT
Start: 2018-10-25 | End: 2019-08-23 | Stop reason: ALTCHOICE

## 2018-10-25 RX ORDER — CEFUROXIME AXETIL 500 MG/1
500 TABLET ORAL 2 TIMES DAILY
Qty: 14 TAB | Refills: 0 | Status: SHIPPED | OUTPATIENT
Start: 2018-10-25 | End: 2019-08-23 | Stop reason: ALTCHOICE

## 2018-10-25 NOTE — PROGRESS NOTES
Subjective:  Ms. Beth Buenrostro is a pleasant 75year old lady who comes in today with a one week history of what started out as some nasal congestion, postnasal drainage and sore throat, has finally settled down in her chest.  She has now developed a productive cough of yellowish to greenish sputum. She denies any shortness of breath, but has had some intermittent wheezing. She denies any hemoptysis. Denies any chills or fever. Denies any nausea or vomiting. Past Medical History:   Diagnosis Date    Arthritis     hands    Cancer ([address]     basal cell of face, squamous, and melanoma    Colitis 04/21/2018    [facility]     Colitis, ischemic [facility]) 9/26/2017    Duodenal ulcer, perforated [facility] [address] [facility] 9/26/2017    Dyspepsia and other specified disorders of function of stomach     GERD (gastroesophageal reflux disease)     History of rectal bleeding 04/2018    Two episodes; first one 19 years ago - labeled as a \"possible ischemic attack\"    Hypercholesterolemia     Hyperlipidemia 9/26/2017    Hypertension     patient denies/no meds    IBS (irritable bowel syndrome) 9/26/2017    IGT (impaired glucose tolerance) 09/26/2017    Borderline Hgb A1C    Insomnia 9/26/2017    Melanoma ([facility] [address] [facility] 9/26/2017    On statin therapy 9/26/2017    Osteopenia 9/26/2017    PUD (peptic ulcer disease)     Temporal arteritis ([facility] [address] [facility] 9/26/2017    Vitamin D deficiency 9/26/2017    Zoster 9/26/2017     Past Surgical History:   Procedure Laterality Date    ABDOMEN SURGERY PROC UNLISTED  1985    perforated ulcer    COLONOSCOPY,DIAGNOSTIC  3/17/2015         COLONOSCOPY,DIAGNOSTIC  8/8/2018         HX COLONOSCOPY      HX ENDOSCOPY      HX GYN  1984    hysterectomy    HX GYN  [id number]    laparoscopy    HX TONSILLECTOMY      UPPER GI ENDOSCOPY,BIOPSY  8/8/2018            Current Outpatient Medications on File Prior to Visit   Medication Sig Dispense Refill    melatonin tab tablet Take 5 mg by mouth nightly.       omeprazole (PRILOSEC) 20 mg capsule Take 1 Cap by mouth daily. 90 Cap 3    simvastatin (ZOCOR) 20 mg tablet TAKE 1 TABLET EVERY DAY (Patient taking differently: TAKE 1 TABLET AT BEDTIME) 90 Tab 3    CALCIUM CARBONATE/VITAMIN D3 (CALTRATE WITH VITAMIN D3 PO) Take 2 Tabs by mouth daily.  aspirin 81 mg tablet Take 81 mg by mouth.  cranberry 500 mg Cap Take 500 mg by mouth daily. No current facility-administered medications on file prior to visit. Allergies   Allergen Reactions    Augmentin [Amoxicillin-Pot Clavulanate] Unknown (comments)     \"hard on my stomach\"    Tetracycline Unknown (comments)     \"funny feeling in my mouth. ..years ago\"   Physical Examination:  GENERAL:  Pleasant lady in no acute distress. She is alert and oriented. VITALS:  BP: 147/84. P: 89.  T: 98.8. O2 sat: 97%. HEENT:  Normocephalic, atraumatic. TMs normal.  Mouth mucosa pink. Tongue midline. Pharynx minimally injected without presence of exudates. NECK:  Supple without adenopathy. CHEST:  Lungs clear to auscultation except for occasional expiratory wheeze, no rales. Good chest excursion. No use of accessory muscles. CV:  Heart regular rhythm without murmur. EXTREMITIES:  No edema or calf tenderness. Distal pulses were present. Impression:  1. Acute bronchitis. Plan:  1. It was opted to start her on Ceftin 500 mg twice daily for seven days, along with a Medrol Dosepak. 2. She is to increase fluids and rest.  3. She may use Tylenol alternating with ibuprofen as needed for fever or discomfort. 4. She certainly will call us should she fail to improve or if her condition worsens.

## 2018-10-25 NOTE — PROGRESS NOTES
Elaine Adams presents with   Chief Complaint   Patient presents with    Nasal Congestion    Cough    Hoarse   Patient of Dr Chakraborty Sol here with complaint of a sore throat, hoarse, chest congestion & nasal drainage that is green/yellow. Started last Wednesday. No fever or chills noted. 1. Have you been to the ER, urgent care clinic since your last visit? Hospitalized since your last visit? No    2. Have you seen or consulted any other health care providers outside of the [address] [place] [address] since your last visit? Include any pap smears or colon screening.  No

## 2018-10-25 NOTE — PATIENT INSTRUCTIONS
Bronchitis: Care Instructions  Your Care Instructions    Bronchitis is inflammation of the bronchial tubes, which carry air to the lungs. The tubes swell and produce mucus, or phlegm. The mucus and inflamed bronchial tubes make you cough. You may have trouble breathing. Most cases of bronchitis are caused by viruses like those that cause colds. Antibiotics usually do not help and they may be harmful. Bronchitis usually develops rapidly and lasts about 2 to 3 weeks in otherwise healthy people. Follow-up care is a key part of your treatment and safety. Be sure to make and go to all appointments, and call your doctor if you are having problems. It's also a good idea to know your test results and keep a list of the medicines you take. How can you care for yourself at home? · Take all medicines exactly as prescribed. Call your doctor if you think you are having a problem with your medicine. · Get some extra rest.  · Take an over-the-counter pain medicine, such as acetaminophen (Tylenol), ibuprofen (Advil, Motrin), or naproxen (Aleve) to reduce fever and relieve body aches. Read and follow all instructions on the label. · Do not take two or more pain medicines at the same time unless the doctor told you to. Many pain medicines have acetaminophen, which is Tylenol. Too much acetaminophen (Tylenol) can be harmful. · Take an over-the-counter cough medicine that contains dextromethorphan to help quiet a dry, hacking cough so that you can sleep. Avoid cough medicines that have more than one active ingredient. Read and follow all instructions on the label. · Breathe moist air from a humidifier, hot shower, or sink filled with hot water. The heat and moisture will thin mucus so you can cough it out. · Do not smoke. Smoking can make bronchitis worse. If you need help quitting, talk to your doctor about stop-smoking programs and medicines. These can increase your chances of quitting for good.   When should you call for help? Call 911 anytime you think you may need emergency care. For example, call if:    · You have severe trouble breathing.    Call your doctor now or seek immediate medical care if:    · You have new or worse trouble breathing.     · You cough up dark brown or bloody mucus (sputum).     · You have a new or higher fever.     · You have a new rash.    Watch closely for changes in your health, and be sure to contact your doctor if:    · You cough more deeply or more often, especially if you notice more mucus or a change in the color of your mucus.     · You are not getting better as expected. Where can you learn more? Go to http://kasey-ana paula.info/. Enter H333 in the search box to learn more about \"Bronchitis: Care Instructions. \"  Current as of: December 6, 2017  Content Version: 11.8  © 5642-8226 [employer]. Care instructions adapted under license by [employer] (which disclaims liability or warranty for this information). If you have questions about a medical condition or this instruction, always ask your healthcare professional. [address] any warranty or liability for your use of this information.

## 2018-11-08 ENCOUNTER — OFFICE VISIT (OUTPATIENT)
Dept: INTERNAL MEDICINE CLINIC | Age: 73
End: 2018-11-08

## 2018-11-08 VITALS
BODY MASS INDEX: 29.66 KG/M2 | TEMPERATURE: 97.8 F | DIASTOLIC BLOOD PRESSURE: 78 MMHG | SYSTOLIC BLOOD PRESSURE: 132 MMHG | RESPIRATION RATE: 16 BRPM | OXYGEN SATURATION: 98 % | HEART RATE: 71 BPM | HEIGHT: 65 IN | WEIGHT: 178 LBS

## 2018-11-08 DIAGNOSIS — Z79.899 ON STATIN THERAPY: ICD-10-CM

## 2018-11-08 DIAGNOSIS — E55.9 VITAMIN D DEFICIENCY: ICD-10-CM

## 2018-11-08 DIAGNOSIS — Z23 ENCOUNTER FOR IMMUNIZATION: ICD-10-CM

## 2018-11-08 DIAGNOSIS — M31.6 TEMPORAL ARTERITIS (HCC): Primary | ICD-10-CM

## 2018-11-08 DIAGNOSIS — R73.02 IGT (IMPAIRED GLUCOSE TOLERANCE): ICD-10-CM

## 2018-11-08 DIAGNOSIS — E78.2 MIXED HYPERLIPIDEMIA: ICD-10-CM

## 2018-11-08 LAB
BACTERIA UA POCT, BACTPOCT: NORMAL
BILIRUB UR QL STRIP: NEGATIVE
CASTS UA POCT: NEGATIVE
CLUE CELLS, CLUEPOCT: NEGATIVE
CRYSTALS UA POCT, CRYSPOCT: NEGATIVE
EPITHELIAL CELLS POCT: NORMAL
ERYTHROCYTE [SEDIMENTATION RATE] IN BLOOD: 14 MM/HR (ref 0–20)
GLUCOSE UR-MCNC: NEGATIVE MG/DL
KETONES P FAST UR STRIP-MCNC: NEGATIVE MG/DL
MUCUS UA POCT, MUCPOCT: NORMAL
PH UR STRIP: 5 [PH] (ref 5–7)
PROT UR QL STRIP: NEGATIVE
RBC UA POCT, RBCPOCT: NORMAL
SP GR UR STRIP: 1.01 (ref 1.01–1.02)
TRICH UA POCT, TRICHPOC: NEGATIVE
UA UROBILINOGEN AMB POC: NORMAL (ref 0.2–1)
URINALYSIS CLARITY POC: CLEAR
URINALYSIS COLOR POC: NORMAL
URINE BLOOD POC: NEGATIVE
URINE CULT COMMENT, POCT: NORMAL
URINE LEUKOCYTES POC: NORMAL
URINE NITRITES POC: NEGATIVE
WBC UA POCT, WBCPOCT: NORMAL
YEAST UA POCT, YEASTPOC: NEGATIVE

## 2018-11-08 NOTE — PROGRESS NOTES
Chief Complaint Patient presents with  Follow-up 1. Have you been to the ER, urgent care clinic since your last visit? Hospitalized since your last visit no 
 
2. Have you seen or consulted any other health care providers outside of the [address] [place] [address] since your last visit? Include any pap smears or colon screening.   no

## 2018-11-08 NOTE — PATIENT INSTRUCTIONS
Vaccine Information Statement Influenza (Flu) Vaccine (Inactivated or Recombinant): What you need to know Many Vaccine Information Statements are available in Bengali and other languages. See www.immunize.org/vis Hojas de Información Sobre Vacunas están disponibles en Español y en muchos otros idiomas. Visite www.immunize.org/vis 1. Why get vaccinated? Influenza (flu) is a contagious disease that spreads around the [place] every year, usually between October and May. Flu is caused by influenza viruses, and is spread mainly by coughing, sneezing, and close contact. Anyone can get flu. Flu strikes suddenly and can last several days. Symptoms vary by age, but can include: 
 fever/chills  sore throat  muscle aches  fatigue  cough  headache  runny or stuffy nose Flu can also lead to pneumonia and blood infections, and cause diarrhea and seizures in children. If you have a medical condition, such as heart or lung disease, flu can make it worse. Flu is more dangerous for some people. Infants and young children, people 72years of age and older, pregnant women, and people with certain health conditions or a weakened immune system are at greatest risk. Each year thousands of people in the [facility] die from flu, and many more are hospitalized. Flu vaccine can: 
 keep you from getting flu, 
 make flu less severe if you do get it, and 
 keep you from spreading flu to your family and other people. 2. Inactivated and recombinant flu vaccines A dose of flu vaccine is recommended every flu season. Children 6 months through 6years of age may need two doses during the same flu season. Everyone else needs only one dose each flu season.   
 
 
Some inactivated flu vaccines contain a very small amount of a mercury-based preservative called thimerosal. Studies have not shown thimerosal in vaccines to be harmful, but flu vaccines that do not contain thimerosal are available. There is no live flu virus in flu shots. They cannot cause the flu. There are many flu viruses, and they are always changing. Each year a new flu vaccine is made to protect against three or four viruses that are likely to cause disease in the upcoming flu season. But even when the vaccine doesnt exactly match these viruses, it may still provide some protection Flu vaccine cannot prevent: 
 flu that is caused by a virus not covered by the vaccine, or 
 illnesses that look like flu but are not. It takes about 2 weeks for protection to develop after vaccination, and protection lasts through the flu season. 3. Some people should not get this vaccine Tell the person who is giving you the vaccine:  If you have any severe, life-threatening allergies. If you ever had a life-threatening allergic reaction after a dose of flu vaccine, or have a severe allergy to any part of this vaccine, you may be advised not to get vaccinated. Most, but not all, types of flu vaccine contain a small amount of egg protein.  If you ever had Guillain-Barré Syndrome (also called GBS). Some people with a history of GBS should not get this vaccine. This should be discussed with your doctor.  If you are not feeling well. It is usually okay to get flu vaccine when you have a mild illness, but you might be asked to come back when you feel better. 4. Risks of a vaccine reaction With any medicine, including vaccines, there is a chance of reactions. These are usually mild and go away on their own, but serious reactions are also possible. Most people who get a flu shot do not have any problems with it. Minor problems following a flu shot include:  
 soreness, redness, or swelling where the shot was given  hoarseness  sore, red or itchy eyes  cough  fever  aches  headache  itching  fatigue If these problems occur, they usually begin soon after the shot and last 1 or 2 days. More serious problems following a flu shot can include the following:  There may be a small increased risk of Guillain-Barré Syndrome (GBS) after inactivated flu vaccine. This risk has been estimated at 1 or 2 additional cases per million people vaccinated. This is much lower than the risk of severe complications from flu, which can be prevented by flu vaccine.  Young children who get the flu shot along with pneumococcal vaccine (PCV13) and/or DTaP vaccine at the same time might be slightly more likely to have a seizure caused by fever. Ask your doctor for more information. Tell your doctor if a child who is getting flu vaccine has ever had a seizure. Problems that could happen after any injected vaccine:  People sometimes faint after a medical procedure, including vaccination. Sitting or lying down for about 15 minutes can help prevent fainting, and injuries caused by a fall. Tell your doctor if you feel dizzy, or have vision changes or ringing in the ears.  Some people get severe pain in the shoulder and have difficulty moving the arm where a shot was given. This happens very rarely.  Any medication can cause a severe allergic reaction. Such reactions from a vaccine are very rare, estimated at about 1 in a million doses, and would happen within a few minutes to a few hours after the vaccination. As with any medicine, there is a very remote chance of a vaccine causing a serious injury or death. The safety of vaccines is always being monitored. For more information, visit: www.cdc.gov/vaccinesafety/ 
 
5. What if there is a serious reaction? What should I look for?  Look for anything that concerns you, such as signs of a severe allergic reaction, very high fever, or unusual behavior.  
 
Signs of a severe allergic reaction can include hives, swelling of the face and throat, difficulty breathing, a fast heartbeat, dizziness, and weakness  usually within a few minutes to a few hours after the vaccination. What should I do?  If you think it is a severe allergic reaction or other emergency that cant wait, call 9-1-1 and get the person to the nearest hospital. Otherwise, call your doctor.  Reactions should be reported to the Vaccine Adverse Event Reporting System (VAERS). Your doctor should file this report, or you can do it yourself through  the VAERS web site at www.vaers. [facility].gov, or by calling 2-935.875.4137. VAERS does not give medical advice. 6. The National Vaccine Injury Compensation Program 
 
The [facility] Vaccine Injury Compensation Program (VICP) is a federal program that was created to compensate people who may have been injured by certain vaccines. Persons who believe they may have been injured by a vaccine can learn about the program and about filing a claim by calling 8-316.761.7471 or visiting the [employer] website at www.[facility].gov/vaccinecompensation. There is a time limit to file a claim for compensation. 7. How can I learn more?  Ask your healthcare provider. He or she can give you the vaccine package insert or suggest other sources of information.  Call your local or state health department.  Contact the Centers for Disease Control and Prevention (CDC): 
- Call 3-685.635.7487 (1-800-CDC-INFO) or 
- Visit CDCs website at www.cdc.gov/flu Vaccine Information Statement Inactivated Influenza Vaccine 8/7/2015 
[address] Department of Health and [place] Centers for Disease Control and Prevention Office Use Only

## 2018-11-09 LAB
25(OH)D3+25(OH)D2 SERPL-MCNC: 25.2 NG/ML (ref 30–100)
ALBUMIN SERPL-MCNC: 4.3 G/DL (ref 3.5–4.8)
ALBUMIN/GLOB SERPL: 1.8 {RATIO} (ref 1.2–2.2)
ALP SERPL-CCNC: 106 IU/L (ref 39–117)
ALT SERPL-CCNC: 18 IU/L (ref 0–32)
AST SERPL-CCNC: 19 IU/L (ref 0–40)
BILIRUB SERPL-MCNC: 0.4 MG/DL (ref 0–1.2)
BUN SERPL-MCNC: 16 MG/DL (ref 8–27)
BUN/CREAT SERPL: 22 (ref 12–28)
CALCIUM SERPL-MCNC: 9.5 MG/DL (ref 8.7–10.3)
CHLORIDE SERPL-SCNC: 99 MMOL/L (ref 96–106)
CHOLEST SERPL-MCNC: 199 MG/DL (ref 100–199)
CK SERPL-CCNC: 80 U/L (ref 24–173)
CO2 SERPL-SCNC: 27 MMOL/L (ref 20–29)
CREAT SERPL-MCNC: 0.74 MG/DL (ref 0.57–1)
EST. AVERAGE GLUCOSE BLD GHB EST-MCNC: 128 MG/DL
GLOBULIN SER CALC-MCNC: 2.4 G/DL (ref 1.5–4.5)
GLUCOSE SERPL-MCNC: 97 MG/DL (ref 65–99)
HBA1C MFR BLD: 6.1 % (ref 4.8–5.6)
HDLC SERPL-MCNC: 75 MG/DL
LDLC SERPL CALC-MCNC: 106 MG/DL (ref 0–99)
POTASSIUM SERPL-SCNC: 4.5 MMOL/L (ref 3.5–5.2)
PROT SERPL-MCNC: 6.7 G/DL (ref 6–8.5)
SODIUM SERPL-SCNC: 137 MMOL/L (ref 134–144)
TRIGL SERPL-MCNC: 92 MG/DL (ref 0–149)
VLDLC SERPL CALC-MCNC: 18 MG/DL (ref 5–40)

## 2018-11-15 NOTE — PROGRESS NOTES
This note will not be viewable in [address] Minerva Gibson is a 75 y.o. female and presents with Follow-up Goran Torres Subjective: Yakov Kwok presents today for follow-up of history of impaired glucose tolerance, hyperlipidemia, history of temporal arteritis, vitamin D deficiency and GERD. She is doing well on her current medical regimen. She denies symptoms of dyspepsia or reflux. She said no shortness of breath, chest pain, palpitations, PND, orthopnea, or pedal edema. She denies headaches or visual changes. Review of Systems Constitutional:  
Eyes:   negative for visual disturbance and irritation ENT:   negative for tinnitus,sore throat,nasal congestion,ear pains. hoarseness Respiratory:  negative for cough, hemoptysis, dyspnea,wheezing CV:   negative for chest pain, palpitations, lower extremity edema GI:   negative for nausea, vomiting, diarrhea, abdominal pain,melena Endo:               negative for polyuria,polydipsia,polyphagia,heat intolerance Genitourinary: negative for frequency, dysuria and hematuria Integumentary: negative for rash and pruritus Hematologic:  negative for easy bruising and gum/nose bleeding Musculoskel: negative for myalgias, arthralgias, back pain, muscle weakness, joint pain Neurological:  negative for headaches, dizziness, vertigo, memory problems and gait Behavl/Psych: negative for feelings of anxiety, depression, mood changes Past Medical History:  
Diagnosis Date  Arthritis   
 hands  Cancer ([facility] [address]   
 basal cell of face, squamous, and melanoma  Colitis 04/21/2018 [facility] ER   
 Colitis, ischemic [facility]) 9/26/2017  Duodenal ulcer, perforated ([facility] [address] 9/26/2017  Dyspepsia and other specified disorders of function of stomach  GERD (gastroesophageal reflux disease)  History of rectal bleeding 04/2018 Two episodes; first one 19 years ago - labeled as a \"possible ischemic attack\"  Hypercholesterolemia  Hyperlipidemia 9/26/2017  Hypertension   
 patient denies/no meds  IBS (irritable bowel syndrome) 9/26/2017  
 IGT (impaired glucose tolerance) 09/26/2017 Borderline Hgb A1C  
 Insomnia 9/26/2017  Melanoma ([facility] [address] 9/26/2017  On statin therapy 9/26/2017  Osteopenia 9/26/2017  PUD (peptic ulcer disease)  Temporal arteritis ([facility] [address] 9/26/2017  Vitamin D deficiency 9/26/2017  Zoster 9/26/2017 Past Surgical History:  
Procedure Laterality Date [address] UNLISTED  1985  
 perforated ulcer  COLONOSCOPY,DIAGNOSTIC  3/17/2015  COLONOSCOPY,DIAGNOSTIC  8/8/2018  HX COLONOSCOPY    
 HX ENDOSCOPY    
 HX GYN  [id number]  
 hysterectomy  HX GYN  [id number]  
 laparoscopy  HX TONSILLECTOMY  UPPER GI ENDOSCOPY,BIOPSY  8/8/2018 Social History Socioeconomic History  Marital status:  Spouse name: Not on file  Number of children: Not on file  Years of education: Not on file  Highest education level: Not on file Social Needs  Financial resource strain: Not on file  Food insecurity - worry: Not on file  Food insecurity - inability: Not on file  Transportation needs - medical: Not on file  Transportation needs - non-medical: Not on file Occupational History  Not on file Tobacco Use  Smoking status: Never Smoker  Smokeless tobacco: Never Used Substance and Sexual Activity  Alcohol use: Yes Comment: rarely  Drug use: No  
 Sexual activity: Not on file Other Topics Concern  Not on file Social History Narrative  Not on file Family History Problem Relation Age of Onset  Cancer Mother   
     uterine  Stroke Maternal Grandfather Current Outpatient Medications Medication Sig Dispense Refill  melatonin tab tablet Take 5 mg by mouth nightly.  omeprazole (PRILOSEC) 20 mg capsule Take 1 Cap by mouth daily.  90 Cap 3  
  simvastatin (ZOCOR) 20 mg tablet TAKE 1 TABLET EVERY DAY (Patient taking differently: TAKE 1 TABLET AT BEDTIME) 90 Tab 3  
 CALCIUM CARBONATE/VITAMIN D3 (CALTRATE WITH VITAMIN D3 PO) Take 2 Tabs by mouth daily.  aspirin 81 mg tablet Take 81 mg by mouth.  cranberry 500 mg Cap Take 500 mg by mouth daily.  cefUROXime (CEFTIN) 500 mg tablet Take 1 Tab by mouth two (2) times a day. 14 Tab 0  
 methylPREDNISolone (MEDROL DOSEPACK) 4 mg tablet Take 1 Tab by mouth Specific Days and Specific Times. 1 Dose Pack 0 Allergies Allergen Reactions  Augmentin [Amoxicillin-Pot Clavulanate] Unknown (comments) \"hard on my stomach\"  Tetracycline Unknown (comments) \"funny feeling in my mouth. ..years ago\" Objective: 
Visit Vitals /78 (BP 1 Location: Left arm, BP Patient Position: Sitting) Pulse 71 Temp 97.8 °F (36.6 °C) (Oral) Resp 16 Ht 5' 5\" (1.651 m) Wt 178 lb (80.7 kg) SpO2 98% BMI 29.62 kg/m² Physical Exam:  
General appearance - alert, well appearing, and in no distress Mental status - alert, oriented to person, place, and time EYE-JENNIFER, EOMI, fundi normal, corneas normal, no foreign bodies ENT-ENT exam normal, no neck nodes or sinus tenderness Nose - normal and patent, no erythema, discharge or polyps Mouth - mucous membranes moist, pharynx normal without lesions Neck - supple, no significant adenopathy Chest - clear to auscultation, no wheezes, rales or rhonchi, symmetric air entry Heart - normal rate, regular rhythm, normal S1, S2, no murmurs, rubs, clicks or gallops Abdomen - soft, nontender, nondistended, no masses or organomegaly Lymph- no adenopathy palpable Ext-peripheral pulses normal, no pedal edema, no clubbing or cyanosis Skin-Warm and dry. no hyperpigmentation, vitiligo, or suspicious lesions Neuro -alert, oriented, normal speech, no focal findings or movement disorder noted Musculoskeletal- FROM, no bony abnormalities, no point tenderness Results for orders placed or performed in visit on 11/08/18 HEMOGLOBIN A1C WITH EAG Result Value Ref Range Hemoglobin A1c 6.1 (H) 4.8 - 5.6 % Estimated average glucose 128 mg/dL Narrative Performed at:  [address]  [id number] : Sherwin Hawkins MD, Phone:  [phone] LIPID PANEL Result Value Ref Range Cholesterol, total 199 100 - 199 mg/dL Triglyceride 92 0 - 149 mg/dL HDL Cholesterol 75 >39 mg/dL VLDL, calculated 18 5 - 40 mg/dL LDL, calculated 106 (H) 0 - 99 mg/dL Narrative Performed at:  [address]  [id number] : Sherwin Hawkins MD, Phone:  [phone] METABOLIC PANEL, COMPREHENSIVE Result Value Ref Range Glucose 97 65 - 99 mg/dL BUN 16 8 - 27 mg/dL Creatinine 0.74 0.57 - 1.00 mg/dL GFR est non-AA 80 >59 mL/min/1.73 GFR est AA 92 >59 mL/min/1.73  
 BUN/Creatinine ratio 22 12 - 28 Sodium 137 134 - 144 mmol/L Potassium 4.5 3.5 - 5.2 mmol/L Chloride 99 96 - 106 mmol/L  
 CO2 27 20 - 29 mmol/L Calcium 9.5 8.7 - 10.3 mg/dL Protein, total 6.7 6.0 - 8.5 g/dL Albumin 4.3 3.5 - 4.8 g/dL GLOBULIN, TOTAL 2.4 1.5 - 4.5 g/dL A-G Ratio 1.8 1.2 - 2.2 Bilirubin, total 0.4 0.0 - 1.2 mg/dL Alk. phosphatase 106 39 - 117 IU/L  
 AST (SGOT) 19 0 - 40 IU/L  
 ALT (SGPT) 18 0 - 32 IU/L Narrative Performed at:  [address]  [id number] : Sherwin Hawkins MD, Phone:  [phone] VITAMIN D, 25 HYDROXY Result Value Ref Range VITAMIN D, 25-HYDROXY 25.2 (L) 30.0 - 100.0 ng/mL Narrative Performed at:  [facility] [address]  [id number] : Sherwin Hawkins MD, Phone:  [phone] CK Result Value Ref Range  Creatine Kinase,Total 80 24 - 173 U/L  
 Narrative Performed at:  [address]  [id number] : Ana Luisa Brady MD, Phone:  [phone] AMB POC URINALYSIS DIP STICK AUTO W/ MICRO Result Value Ref Range Color (UA POC) Light Yellow Clarity (UA POC) Clear Glucose (UA POC) Negative Negative Bilirubin (UA POC) Negative Negative Ketones (UA POC) Negative Negative Specific gravity (UA POC) 1.015 1.010 - 1.025 Blood (UA POC) Negative Negative pH (UA POC) 5.0 5.0 - 7.0 Protein (UA POC) Negative Negative Urobilinogen (UA POC) normal 0.2 - 1 Nitrites (UA POC) Negative Negative Leukocyte esterase (UA POC) 1+ Negative Epithelial cells (UA POC) Few Mucus (UA POC) None WBCs (UA POC) 0-3 RBCs (UA POC) 0-2 Casts (UA POC) Negative Negative Crystals (UA POC) Negative Negative Clue Cells (UA POC) NEGATIVE Trichomonas (UA POC) Negative Yeast (UA POC) Negative Bacteria (UA POC) None Negative URINE CULT COMMENT (UA POC) Culture Not Indicated AMB POC SEDIMENTATION RATE, ERYTHROCYTE; NON AUTO Result Value Ref Range ESR (POC) 14.0 0 - 20 mm/hr All results for lab orders may not have been returned by the time this encountered was closed. Assessment/Plan: ICD-10-CM ICD-9-CM 1. Temporal arteritis (HCC) M31.6 446.5 AMB POC SEDIMENTATION RATE, ERYTHROCYTE; NON AUTO 2. IGT (impaired glucose tolerance) R73.02 790.22 HEMOGLOBIN A1C WITH EAG  
   METABOLIC PANEL, COMPREHENSIVE  
   AMB POC URINALYSIS DIP STICK AUTO W/ MICRO 3. On statin therapy Z79.899 V58.69 LIPID PANEL  
   CK 4. Mixed hyperlipidemia E78.2 272.2 LIPID PANEL 5. Vitamin D deficiency E55.9 268.9 VITAMIN D, 25 HYDROXY 6. Encounter for immunization Z23 V03.89 INFLUENZA VACCINE INACTIVATED (IIV), SUBUNIT, ADJUVANTED, IM Orders Placed This Encounter  Influenza Vaccine Inactivated (IIV)(FLUAD), Subunit, Adjuvanted, IM, ([id number])  HEMOGLOBIN A1C WITH EAG  
 LIPID PANEL  
 METABOLIC PANEL, COMPREHENSIVE  VITAMIN D, 25 HYDROXY  CK  AMB POC URINALYSIS DIP STICK AUTO W/ MICRO  AMB POC SEDIMENTATION RATE, ERYTHROCYTE; NON AUTO Plan: 
 
Multiple problems as outlined above. Patient is clinically stable. Further recommendations based on lab results. Follow-up Disposition: 
Return in about 6 months (around 5/8/2019) for [address] I have reviewed with the patient details of the assessment and plan and all questions were answered. Relevent patient education was performed. Verbal and/or written instructions (see AVS) provided. The most recent lab findings were reviewed with the patient. Plan was discussed with patient who verbal expressed understanding. An After Visit Summary was printed and given to the patient.  
 
 
Ralph Christine MD

## 2019-02-22 ENCOUNTER — TELEPHONE (OUTPATIENT)
Dept: INTERNAL MEDICINE CLINIC | Age: 74
End: 2019-02-22

## 2019-02-22 NOTE — TELEPHONE ENCOUNTER
Patient is calling, she is currently in [place] for 4 months, she will be going on a cruise on the 9th of March and would like to have a prescription for dicyclomine 10mg called into the pharmacy on file, she is also requesting to have something called in, in case she becomes sick on the cruise.     Best call back # for patient: [phone]  Pharmacy on file verified

## 2019-02-25 RX ORDER — SCOLOPAMINE TRANSDERMAL SYSTEM 1 MG/1
1 PATCH, EXTENDED RELEASE TRANSDERMAL
Qty: 5 PATCH | Refills: 0 | Status: SHIPPED | OUTPATIENT
Start: 2019-02-25 | End: 2019-08-23

## 2019-02-25 RX ORDER — DOXYCYCLINE 100 MG/1
100 TABLET ORAL 2 TIMES DAILY
Qty: 20 TAB | Refills: 0 | Status: SHIPPED | OUTPATIENT
Start: 2019-02-25 | End: 2019-08-23 | Stop reason: ALTCHOICE

## 2019-03-01 RX ORDER — DICYCLOMINE HYDROCHLORIDE 10 MG/1
10 CAPSULE ORAL 4 TIMES DAILY
Qty: 120 CAP | Refills: 0 | Status: SHIPPED | OUTPATIENT
Start: 2019-03-01 | End: 2019-03-20 | Stop reason: SDUPTHER

## 2019-03-01 NOTE — TELEPHONE ENCOUNTER
Requested Prescriptions     Pending Prescriptions Disp Refills    dicyclomine (BENTYL) 10 mg capsule 120 Cap 0     Sig: Take 1 Cap by mouth four (4) times daily.        Last Refill: 03/05/19 (Historical)  Next Appointment:05/06/19

## 2019-03-20 NOTE — TELEPHONE ENCOUNTER
Requested Prescriptions     Pending Prescriptions Disp Refills    dicyclomine (BENTYL) 10 mg capsule 360 Cap 0     Sig: Take 1 Cap by mouth four (4) times daily.        Last Refill: 03/01/19 ( 30 day supply to pharm in [place])  Next Appointment:05/06/19

## 2019-03-21 RX ORDER — DICYCLOMINE HYDROCHLORIDE 10 MG/1
10 CAPSULE ORAL 4 TIMES DAILY
Qty: 360 CAP | Refills: 0 | Status: SHIPPED | OUTPATIENT
Start: 2019-03-21 | End: 2021-04-21 | Stop reason: SDUPTHER

## 2019-04-19 RX ORDER — OMEPRAZOLE 20 MG/1
CAPSULE, DELAYED RELEASE ORAL
Qty: 90 CAP | Refills: 3 | Status: SHIPPED | OUTPATIENT
Start: 2019-04-19 | End: 2020-03-18 | Stop reason: SDUPTHER

## 2019-04-19 RX ORDER — SIMVASTATIN 20 MG/1
TABLET, FILM COATED ORAL
Qty: 90 TAB | Refills: 3 | Status: SHIPPED | OUTPATIENT
Start: 2019-04-19 | End: 2020-03-18 | Stop reason: SDUPTHER

## 2019-07-11 ENCOUNTER — OFFICE VISIT (OUTPATIENT)
Dept: INTERNAL MEDICINE CLINIC | Age: 74
End: 2019-07-11

## 2019-07-11 VITALS
TEMPERATURE: 98.3 F | BODY MASS INDEX: 29.14 KG/M2 | HEIGHT: 65 IN | DIASTOLIC BLOOD PRESSURE: 72 MMHG | WEIGHT: 174.9 LBS | RESPIRATION RATE: 16 BRPM | HEART RATE: 73 BPM | OXYGEN SATURATION: 93 % | SYSTOLIC BLOOD PRESSURE: 138 MMHG

## 2019-07-11 DIAGNOSIS — E78.2 MIXED HYPERLIPIDEMIA: ICD-10-CM

## 2019-07-11 DIAGNOSIS — R31.29 OTHER MICROSCOPIC HEMATURIA: ICD-10-CM

## 2019-07-11 DIAGNOSIS — Z00.00 MEDICARE ANNUAL WELLNESS VISIT, SUBSEQUENT: Primary | ICD-10-CM

## 2019-07-11 DIAGNOSIS — E55.9 VITAMIN D DEFICIENCY: ICD-10-CM

## 2019-07-11 DIAGNOSIS — Z13.31 SCREENING FOR DEPRESSION: ICD-10-CM

## 2019-07-11 DIAGNOSIS — R10.9 FLANK PAIN: ICD-10-CM

## 2019-07-11 DIAGNOSIS — E87.5 HYPERKALEMIA: ICD-10-CM

## 2019-07-11 DIAGNOSIS — Z79.899 ON STATIN THERAPY: ICD-10-CM

## 2019-07-11 DIAGNOSIS — Z13.39 SCREENING FOR ALCOHOLISM: ICD-10-CM

## 2019-07-11 DIAGNOSIS — R73.02 IGT (IMPAIRED GLUCOSE TOLERANCE): ICD-10-CM

## 2019-07-11 LAB
25(OH)D3 SERPL-MCNC: 25 NG/ML (ref 30–96)
A-G RATIO,AGRAT: 1.7 RATIO
ALBUMIN SERPL-MCNC: 4.6 G/DL (ref 3.9–5.4)
ALP SERPL-CCNC: 123 U/L (ref 38–126)
ALT SERPL-CCNC: 30 U/L (ref 9–52)
ANION GAP SERPL CALC-SCNC: 12 MMOL/L
AST SERPL W P-5'-P-CCNC: 29 U/L (ref 14–36)
BACTERIA,BACTU: ABNORMAL
BILIRUB SERPL-MCNC: 0.5 MG/DL (ref 0.2–1.3)
BILIRUB UR QL: NEGATIVE
BUN SERPL-MCNC: 16 MG/DL (ref 7–17)
BUN/CREATININE RATIO,BUCR: 18 RATIO
CALCIUM SERPL-MCNC: 9.6 MG/DL (ref 8.4–10.2)
CHLORIDE SERPL-SCNC: 101 MMOL/L (ref 98–107)
CHOL/HDL RATIO,CHHD: 2 RATIO (ref 0–4)
CHOLEST SERPL-MCNC: 173 MG/DL (ref 0–200)
CK SERPL-CCNC: 72 U/L (ref 30–135)
CLARITY: CLEAR
CO2 SERPL-SCNC: 26 MMOL/L (ref 22–32)
COLOR UR: ABNORMAL
CREAT SERPL-MCNC: 0.9 MG/DL (ref 0.7–1.2)
GLOBULIN,GLOB: 2.7
GLUCOSE 24H UR-MRATE: NEGATIVE G/(24.H)
GLUCOSE SERPL-MCNC: 103 MG/DL (ref 65–105)
HDLC SERPL-MCNC: 79 MG/DL (ref 35–130)
HGB UR QL STRIP: NEGATIVE
KETONES UR QL STRIP.AUTO: NEGATIVE
LDL/HDL RATIO,LDHD: 1 RATIO
LDLC SERPL CALC-MCNC: 77 MG/DL (ref 0–130)
LEUKOCYTE ESTERASE: ABNORMAL
NITRITE UR QL STRIP.AUTO: NEGATIVE
PH UR STRIP: 5 [PH] (ref 5–7)
POTASSIUM SERPL-SCNC: 5.6 MMOL/L (ref 3.6–5)
PROT SERPL-MCNC: 7.3 G/DL (ref 6.3–8.2)
PROT UR STRIP-MCNC: NEGATIVE MG/DL
RBC #/AREA URNS HPF: 0 #/HPF
SODIUM SERPL-SCNC: 139 MMOL/L (ref 137–145)
SP GR UR REFRACTOMETRY: 1.01 (ref 1–1.03)
SQUAMOUS EPITHELIAL CELLS: ABNORMAL
TRIGL SERPL-MCNC: 83 MG/DL (ref 0–200)
UROBILINOGEN UR QL STRIP.AUTO: NEGATIVE
VLDLC SERPL CALC-MCNC: 17 MG/DL
WBC URNS QL MICRO: ABNORMAL #/HPF

## 2019-07-11 RX ORDER — ASCORBIC ACID 500 MG
TABLET ORAL
COMMUNITY

## 2019-07-11 RX ORDER — GLUCOSAMINE/CHONDR SU A SOD 750-600 MG
5000 TABLET ORAL DAILY
COMMUNITY

## 2019-07-11 NOTE — PROGRESS NOTES
This note will not be viewable in [address] Trisha Mcmahan is a 75 y.o. female and presents with Annual Wellness Visit and Follow-up (6 mo fu)  . Subjective:    Mrs. Terri Dancer presents today for Medicare annual wellness visit and follow-up of several problems including GERD, impaired glucose tolerance, hyperlipidemia, osteopenia, and history of colitis. She has some left flank pain while in [place] and was seen there twice and diagnosed with a UTI. She had follow-up with a urologist here and a catheterized urine sample did not reveal evidence for UTI. The flank pain is intermittent and not persistent. She does not have a history of kidney stones. She has not had any change in bowel habits. She denies any melena or hematochezia. She has no shortness of breath, chest pain, palpitations, PND, orthopnea, or pedal edema. Review of Systems  Constitutional:   Eyes:   negative for visual disturbance and irritation  ENT:   negative for tinnitus,sore throat,nasal congestion,ear pains. hoarseness  Respiratory:  negative for cough, hemoptysis, dyspnea,wheezing  CV:   negative for chest pain, palpitations, lower extremity edema  GI:   negative for nausea, vomiting, diarrhea, abdominal pain,melena  Endo:               negative for polyuria,polydipsia,polyphagia,heat intolerance  Genitourinary: negative for frequency, dysuria and hematuria  Integumentary: negative for rash and pruritus  Hematologic:  negative for easy bruising and gum/nose bleeding  Musculoskel: negative for myalgias, arthralgias, back pain, muscle weakness, joint pain  Neurological:  negative for headaches, dizziness, vertigo, memory problems and gait   Behavl/Psych: negative for feelings of anxiety, depression, mood changes    Past Medical History:   Diagnosis Date    Arthritis     hands    Cancer ([facility]     basal cell of face, squamous, and melanoma    Colitis 04/21/2018    [facility]     Colitis, ischemic ([facility] [address] 9/26/2017    Duodenal ulcer, perforated ([facility] 9/26/2017    Dyspepsia and other specified disorders of function of stomach     GERD (gastroesophageal reflux disease)     History of rectal bleeding 04/2018    Two episodes; first one 19 years ago - labeled as a \"possible ischemic attack\"    Hypercholesterolemia     Hyperlipidemia 9/26/2017    Hypertension     patient denies/no meds    IBS (irritable bowel syndrome) 9/26/2017    IGT (impaired glucose tolerance) 09/26/2017    Borderline Hgb A1C    Insomnia 9/26/2017    Melanoma ([facility] 9/26/2017    On statin therapy 9/26/2017    Osteopenia 9/26/2017    PUD (peptic ulcer disease)     Temporal arteritis ([facility] 9/26/2017    Vitamin D deficiency 9/26/2017    Zoster 9/26/2017     Past Surgical History:   Procedure Laterality Date    ABDOMEN SURGERY PROC UNLISTED  1985    perforated ulcer    COLONOSCOPY N/A 8/8/2018    [facility]COPY    COLONOSCOPY,DIAGNOSTIC  3/17/2015         COLONOSCOPY,DIAGNOSTIC  8/8/2018         HX COLONOSCOPY      HX ENDOSCOPY      HX GYN  1984    hysterectomy    HX GYN  1980's    laparoscopy    HX TONSILLECTOMY      UPPER GI ENDOSCOPY,BIOPSY  8/8/2018          Social History     Socioeconomic History    Marital status:      Spouse name: Not on file    Number of children: Not on file    Years of education: Not on file    Highest education level: Not on file   Tobacco Use    Smoking status: Never Smoker    Smokeless tobacco: Never Used   Substance and Sexual Activity    Alcohol use: Yes     Comment: rarely    Drug use: No     Family History   Problem Relation Age of Onset    Cancer Mother         uterine    Stroke Maternal Grandfather      Current Outpatient Medications   Medication Sig Dispense Refill    multivit,iron,minerals/lutein (CENTRUM SILVER ULTRA WOMEN'S PO) Take  by mouth.  ascorbic acid, vitamin C, (VITAMIN C) 500 mg tablet Take  by mouth.       Biotin 2,500 mcg cap Take 5,000 mg by mouth daily.  omeprazole (PRILOSEC) 20 mg capsule TAKE 1 CAPSULE EVERY DAY 90 Cap 3    simvastatin (ZOCOR) 20 mg tablet TAKE 1 TABLET AT BEDTIME 90 Tab 3    dicyclomine (BENTYL) 10 mg capsule Take 1 Cap by mouth four (4) times daily. 360 Cap 0    melatonin tab tablet Take 5 mg by mouth nightly.  CALCIUM CARBONATE/VITAMIN D3 (CALTRATE WITH VITAMIN D3 PO) Take 2 Tabs by mouth daily.  aspirin 81 mg tablet Take 81 mg by mouth.  cranberry 500 mg Cap Take 500 mg by mouth daily.  doxycycline (ADOXA) 100 mg tablet Take 1 Tab by mouth two (2) times a day. 20 Tab 0    scopolamine (TRANSDERM-SCOP) 1 mg over 3 days pt3d 1 Patch by TransDERmal route every seventy-two (72) hours. 5 Patch 0    cefUROXime (CEFTIN) 500 mg tablet Take 1 Tab by mouth two (2) times a day. 14 Tab 0    methylPREDNISolone (MEDROL DOSEPACK) 4 mg tablet Take 1 Tab by mouth Specific Days and Specific Times. 1 Dose Pack 0     Allergies   Allergen Reactions    Augmentin [Amoxicillin-Pot Clavulanate] Unknown (comments)     \"hard on my stomach\"    Tetracycline Unknown (comments)     \"funny feeling in my mouth. ..years ago\"       Objective:  Visit Vitals  /72   Pulse 73   Temp 98.3 °F (36.8 °C) (Oral)   Resp 16   Ht 5' 5\" (1.651 m)   Wt 174 lb 14.4 oz (79.3 kg)   SpO2 93%   BMI 29.10 kg/m²     Physical Exam:   General appearance - alert, well appearing, and in no distress  Mental status - alert, oriented to person, place, and time  EYE-JENNIFER, EOMI, fundi normal, corneas normal, no foreign bodies  ENT-ENT exam normal, no neck nodes or sinus tenderness  Nose - normal and patent, no erythema, discharge or polyps  Mouth - mucous membranes moist, pharynx normal without lesions  Neck - supple, no significant adenopathy   Chest - clear to auscultation, no wheezes, rales or rhonchi, symmetric air entry   Heart - normal rate, regular rhythm, normal S1, S2, no murmurs, rubs, clicks or gallops Abdomen - soft, nontender, nondistended, no masses or organomegaly  Lymph- no adenopathy palpable  Ext-peripheral pulses normal, no pedal edema, no clubbing or cyanosis  Skin-Warm and dry. no hyperpigmentation, vitiligo, or suspicious lesions  Neuro -alert, oriented, normal speech, no focal findings or movement disorder noted  Musculoskeletal- FROM, no bony abnormalities, no point tenderness    Results for orders placed or performed in visit on 07/11/19   CK   Result Value Ref Range    CK 72.00 30.00 - 135.00 U/L   LIPID PANEL   Result Value Ref Range    Cholesterol, total 173 0 - 200 mg/dL    Triglyceride 83 0 - 200 mg/dL    HDL Cholesterol 79 35 - 130 mg/dL    VLDL 17 mg/dL    LDL, calculated 77 0 - 130 mg/dL    CHOL/HDL Ratio 2 0 - 4 Ratio    LDL/HDL Ratio 1 Ratio   METABOLIC PANEL, COMPREHENSIVE   Result Value Ref Range    Glucose 103 65 - 105 mg/dL    BUN 16.0 7.0 - 17.0 mg/dL    Creatinine 0.9 0.7 - 1.2 mg/dL    Sodium 139 137 - 145 mmol/L    Potassium 5.6 (H) 3.6 - 5.0 mmol/L    Chloride 101 98 - 107 mmol/L    CO2 26.0 22.0 - 32.0 mmol/L    Calcium 9.6 8.4 - 10.2 mg/dl    Protein, total 7.3 6.3 - 8.2 g/dL    Albumin 4.6 3.9 - 5.4 g/dL    AST (SGOT) 29.0 14.0 - 36.0 U/L    ALT (SGPT) 30 9 - 52 U/L    Alk.  phosphatase 123 38 - 126 U/L    Bilirubin, total 0.5 0.2 - 1.3 mg/dL    BUN/Creatinine ratio 18 Ratio    GFR est AA >60 mL/min/1.73m2    GFR est non-AA >60 mL/min/1.73m2    Globulin 2.70     A-G Ratio 1.7 Ratio    Anion gap 12 mmol/L   URINALYSIS W/O MICRO   Result Value Ref Range    Color Pale Yellow Pale Yellow - Yellow    CLARITY clear Clear    Glucose urine, 24 hr Negative Negative    Ketone Negative Negative    Bilirubin Negative Negative    Specific gravity 1.015 1.000 - 1.030    pH (UA) 5 5 - 7    Blood Negative Negative    Protein Negative Negative    Urobilinogen Negative Negative    Nitrites Negative Negative    Leukocyte Esterase 2+ (A) Negative   VITAMIN D, 25 HYDROXY   Result Value Ref Range VITAMIN D, 25-HYDROXY 25 (L) 30 - 96 ng/mL   URINALYSIS; MICROSCOPIC ONLY   Result Value Ref Range    WBC 3-5 (A) 0 #/HPF    RBC 0 0 #/HPF    Bacteria Occasional (A) None Seen    SQUAMOUS EPITHELIAL CELLS Few (A) None Seen     All results for lab orders may not have been returned by the time this encountered was closed. Assessment/Plan:       ICD-10-CM ICD-9-CM    1. On statin therapy Z79.899 V58.69 CK   2. Vitamin D deficiency E55.9 268.9 VITAMIN D, 25 HYDROXY   3. Mixed hyperlipidemia E78.2 272.2 LIPID PANEL   4. IGT (impaired glucose tolerance) N66.32 801.51 METABOLIC PANEL, COMPREHENSIVE      URINALYSIS W/O MICRO      HEMOGLOBIN A1C WITH EAG   5. Flank pain R10.9 789.09 US RETROPERITONEUM COMP   6. Other microscopic hematuria  R31.29 599.72 US RETROPERITONEUM COMP      CULTURE, URINE       Orders Placed This Encounter    CULTURE, URINE    US RETROPERITONEUM COMP     Standing Status:   Future     Standing Expiration Date:   8/11/2020     Order Specific Question:   Reason for Exam     Answer:   left flank pain    CK ([facility])    LIPID PANEL ([facility])    METABOLIC PANEL, COMPREHENSIVE ([facility])    URINALYSIS W/O MICRO ([facility])    HEMOGLOBIN A1C WITH EAG    VITAMIN D, 25 HYDROXY ([facility])    URINALYSIS; MICROSCOPIC ONLY    multivit,iron,minerals/lutein (CENTRUM SILVER ULTRA WOMEN'S PO)     Sig: Take  by mouth.  ascorbic acid, vitamin C, (VITAMIN C) 500 mg tablet     Sig: Take  by mouth.  Biotin 2,500 mcg cap     Sig: Take 5,000 mg by mouth daily. Plan:    Continue current medical regimen as outlined above. Further recommendations based on labs as ordered. Given her flank pain she will be referred for a renal ultrasound. She has had some mild hematuria intermittently previously and this needs to be investigated further.   If the ultrasound is not revealing and her and she continues to have symptoms she will be referred for CT scan of the abdomen. I have reviewed with the patient details of the assessment and plan and all questions were answered. Relevent patient education was performed. Verbal and/or written instructions (see AVS) provided. The most recent lab findings were reviewed with the patient. Plan was discussed with patient who verbal expressed understanding. An After Visit Summary was printed and given to the patient. Donna Aguayo MD    This is the Subsequent Medicare Annual Wellness Exam, performed 12 months or more after the Initial AWV or the last Subsequent AWV    I have reviewed the patient's medical history in detail and updated the computerized patient record.      History     Past Medical History:   Diagnosis Date    Arthritis     hands    Cancer ([address]     basal cell of face, squamous, and melanoma    Colitis 04/21/2018    [facility]     Colitis, ischemic [facility]) 9/26/2017    Duodenal ulcer, perforated ([facility] [address] 9/26/2017    Dyspepsia and other specified disorders of function of stomach     GERD (gastroesophageal reflux disease)     History of rectal bleeding 04/2018    Two episodes; first one 19 years ago - labeled as a \"possible ischemic attack\"    Hypercholesterolemia     Hyperlipidemia 9/26/2017    Hypertension     patient denies/no meds    IBS (irritable bowel syndrome) 9/26/2017    IGT (impaired glucose tolerance) 09/26/2017    Borderline Hgb A1C    Insomnia 9/26/2017    Melanoma ([facility] [address] 9/26/2017    On statin therapy 9/26/2017    Osteopenia 9/26/2017    PUD (peptic ulcer disease)     Temporal arteritis ([facility] [address] 9/26/2017    Vitamin D deficiency 9/26/2017    Zoster 9/26/2017      Past Surgical History:   Procedure Laterality Date    ABDOMEN SURGERY PROC UNLISTED  1985    perforated ulcer    COLONOSCOPY N/A 8/8/2018    COL[facility] performed [facility] ENDOSCOPY    COLONOSCOPY,DIAGNOSTIC  3/17/2015         COLONOSCOPY,DIAGNOSTIC  8/8/2018         HX COLONOSCOPY      HX ENDOSCOPY      HX GYN  1984    hysterectomy    HX GYN  [id number]    laparoscopy    HX TONSILLECTOMY      UPPER GI ENDOSCOPY,BIOPSY  8/8/2018          Current Outpatient Medications   Medication Sig Dispense Refill    multivit,iron,minerals/lutein (CENTRUM SILVER ULTRA WOMEN'S PO) Take  by mouth.  ascorbic acid, vitamin C, (VITAMIN C) 500 mg tablet Take  by mouth.  Biotin 2,500 mcg cap Take 5,000 mg by mouth daily.  omeprazole (PRILOSEC) 20 mg capsule TAKE 1 CAPSULE EVERY DAY 90 Cap 3    simvastatin (ZOCOR) 20 mg tablet TAKE 1 TABLET AT BEDTIME 90 Tab 3    dicyclomine (BENTYL) 10 mg capsule Take 1 Cap by mouth four (4) times daily. 360 Cap 0    melatonin tab tablet Take 5 mg by mouth nightly.  CALCIUM CARBONATE/VITAMIN D3 (CALTRATE WITH VITAMIN D3 PO) Take 2 Tabs by mouth daily.  aspirin 81 mg tablet Take 81 mg by mouth.  cranberry 500 mg Cap Take 500 mg by mouth daily.  doxycycline (ADOXA) 100 mg tablet Take 1 Tab by mouth two (2) times a day. 20 Tab 0    scopolamine (TRANSDERM-SCOP) 1 mg over 3 days pt3d 1 Patch by TransDERmal route every seventy-two (72) hours. 5 Patch 0    cefUROXime (CEFTIN) 500 mg tablet Take 1 Tab by mouth two (2) times a day. 14 Tab 0    methylPREDNISolone (MEDROL DOSEPACK) 4 mg tablet Take 1 Tab by mouth Specific Days and Specific Times. 1 Dose Pack 0     Allergies   Allergen Reactions    Augmentin [Amoxicillin-Pot Clavulanate] Unknown (comments)     \"hard on my stomach\"    Tetracycline Unknown (comments)     \"funny feeling in my mouth. ..years ago\"     Family History   Problem Relation Age of Onset    Cancer Mother         uterine    Stroke Maternal Grandfather      Social History     Tobacco Use    Smoking status: Never Smoker    Smokeless tobacco: Never Used   Substance Use Topics    Alcohol use: Yes     Comment: rarely     Patient Active Problem List   Diagnosis Code    Abdominal pain R10.9    Gallstones K80.20    Hyperlipidemia E78.5    Osteopenia M85.80    Zoster B02.9    Melanoma (HCC) C43.9    Temporal arteritis (HCC) M31.6    On statin therapy Z79.899    Vitamin D deficiency E55.9    IGT (impaired glucose tolerance) R73.02    IBS (irritable bowel syndrome) K58.9    Duodenal ulcer, perforated ([facility] [address] K26.5    Colitis, ischemic (HCC) K55.9    Insomnia G47.00    Back pain M54.9       Depression Risk Factor Screening:     3 most recent PHQ Screens 7/11/2019   Little interest or pleasure in doing things Not at all   Feeling down, depressed, irritable, or hopeless Not at all   Total Score PHQ 2 0     Alcohol Risk Factor Screening: You do not drink alcohol or very rarely. Functional Ability and Level of Safety:   Hearing Loss  Hearing is good. Activities of Daily Living  The home contains: no safety equipment. Patient does total self care    Fall Risk  Fall Risk Assessment, last 12 mths 7/11/2019   Able to walk? Yes   Fall in past 12 months? No   Fall with injury? -   Number of falls in past 12 months -   Fall Risk Score -       Abuse Screen  Patient is not abused    Cognitive Screening   Evaluation of Cognitive Function:  Has your family/caregiver stated any concerns about your memory: no  Normal    Patient Care Team   Patient Care Team:  Samantha Car MD as PCP - General (Internal Medicine)  Ankit Stratton MD as Physician (Cardiology)    Assessment/Plan   Education and counseling provided:  Are appropriate based on today's review and evaluation    Diagnoses and all orders for this visit:    1. Medicare annual wellness visit, subsequent    2. On statin therapy  -     CK    3. Vitamin D deficiency  -     VITAMIN D, 25 HYDROXY    4. Mixed hyperlipidemia  -     LIPID PANEL    5. IGT (impaired glucose tolerance)  -     METABOLIC PANEL, COMPREHENSIVE  -     URINALYSIS W/O MICRO  -     HEMOGLOBIN A1C WITH EAG    6. Flank pain  -     US RETROPERITONEUM COMP; Future    7.  Other microscopic hematuria   -     US RETROPERITONEUM COMP; Future  -     CULTURE, URINE    8. Screening for alcoholism  -     [place] ANNUAL ALCOHOL SCREEN 15 MIN    9.  Screening for depression  -     DEPRESSION SCREEN ANNUAL    Other orders  -     URINALYSIS; MICROSCOPIC ONLY        Health Maintenance Due   Topic Date Due    Shingrix Vaccine Age 50> (1 of 2) 05/26/1994    GLAUCOMA SCREENING Q2Y  05/26/2009    MEDICARE YEARLY EXAM  05/08/2019

## 2019-07-11 NOTE — PATIENT INSTRUCTIONS
The best way to stay healthy is to live a healthy lifestyle. A healthy lifestyle includes regular exercise, eating a well-balanced diet, keeping a healthy weight and not smoking. Regular physical exams and screening tests are another important way to take care of yourself. Preventive exams provided by health care providers can find health problems early when treatment works best and can keep you from getting certain diseases or illnesses. Preventive services include exams, lab tests, screenings, shots, monitoring and information to help you take care of your own health. All people over 65 should have a pneumonia shot. Pneumonia shots are usually only needed once in a lifetime unless your doctor decides differently. In addition to your physical exam, some screening tests are recommended: 
 
All people over 65 should have a yearly flu shot. People over 65 are at medium to high risk for Hepatitis B. Three shots are needed for complete protection. Bone mass measurement (dexa scan) is recommended every two years. Diabetes Mellitus screening is recommended every year. Glaucoma is an eye disease caused by high pressure in the eye. An eye exam is recommended every year. Cardiovascular screening tests that check your cholesterol and other blood fat (lipid) levels are recommended every five years. Colorectal Cancer screening tests help to find pre-cancerous polyps (growths in the colon) so they can be removed before they turn into cancer. Tests ordered for screening depend on your personal and family history risk factors. Prostate Cancer Screening (annually up to age 76) Screening for breast cancer is recommended yearly with a Mammogram. 
 
Screening for cervical and vaginal cancer is recommended with a pelvic and Pap test every two years.  However if you have had an abnormal pap in the past  three years or at high risk for cervical or vaginal cancer Medicare will cover a pap test and a pelvic exam every year. Here is a list of your current Health Maintenance items with a due date: 
Health Maintenance Due Topic Date Due  Shingles Vaccine (1 of 2) 05/26/1994  Glaucoma Screening   05/26/2009 Bauer Annual Well Visit  05/08/2019 Medicare Wellness Visit, Female The best way to live healthy is to have a lifestyle where you eat a well-balanced diet, exercise regularly, limit alcohol use, and quit all forms of tobacco/nicotine, if applicable. Regular preventive services are another way to keep healthy. Preventive services (vaccines, screening tests, monitoring & exams) can help personalize your care plan, which helps you manage your own care. Screening tests can find health problems at the earliest stages, when they are easiest to treat. [facility] follows the current, evidence-based guidelines published by the [facility] Leroy Katherine ([facility]) when recommending preventive services for our patients. Because we follow these guidelines, sometimes recommendations change over time as research supports it. (For example, mammograms used to be recommended annually. Even though Medicare will still pay for an annual mammogram, the newer guidelines recommend a mammogram every two years for women of average risk.) Of course, you and your doctor may decide to screen more often for some diseases, based on your risk and your health status. Preventive services for you include: - Medicare offers their members a free annual wellness visit, which is time for you and your primary care provider to discuss and plan for your preventive service needs. Take advantage of this benefit every year! 
-All adults over the age of 72 should receive the recommended pneumonia vaccines.  Current USPSTF guidelines recommend a series of two vaccines for the best pneumonia protection.  
-All adults should have a flu vaccine yearly and a tetanus vaccine every 10 years. All adults age 61 and older should receive a shingles vaccine once in their lifetime.   
-A bone mass density test is recommended when a woman turns 65 to screen for osteoporosis. This test is only recommended one time, as a screening. Some providers will use this same test as a disease monitoring tool if you already have osteoporosis. -All adults age 38-68 who are overweight should have a diabetes screening test once every three years.  
-Other screening tests and preventive services for persons with diabetes include: an eye exam to screen for diabetic retinopathy, a kidney function test, a foot exam, and stricter control over your cholesterol.  
-Cardiovascular screening for adults with routine risk involves an electrocardiogram (ECG) at intervals determined by your doctor.  
-Colorectal cancer screenings should be done for adults age 54-65 with no increased risk factors for colorectal cancer. There are a number of acceptable methods of screening for this type of cancer. Each test has its own benefits and drawbacks. Discuss with your doctor what is most appropriate for you during your annual wellness visit. The different tests include: colonoscopy (considered the best screening method), a fecal occult blood test, a fecal DNA test, and sigmoidoscopy. -Breast cancer screenings are recommended every other year for women of normal risk, age 54-69. 
-Cervical cancer screenings for women over age 72 are only recommended with certain risk factors.  
-All adults born between [facility] should be screened once for Hepatitis C. Here is a list of your current Health Maintenance items (your personalized list of preventive services) with a due date: 
Health Maintenance Due Topic Date Due  Shingles Vaccine (1 of 2) 05/26/1994  Glaucoma Screening   05/26/2009 Kirby Vu Annual Well Visit  05/08/2019 All Medicare beneficiaries aged 48 and older are covered; however, when a beneficiary is at high risk, there is no minimum age required to receive a screening colonoscopy or a barium enema rendered as an alternative to a screening colonoscopy. The following are the coverage criteria for each colorectal cancer screening test/procedure. Screening FOBT Medicare provides coverage of a screening FOBT annually (i.e., at least 11 months have passed following the month in which the last covered screening FOBT was performed) for beneficiaries aged 48 and older. This screening requires a written order from the beneficiarys attending physician. NOTE: Medicare will only provide coverage for one FOBT per year: either HCPCS code  or CPT code 52093, but not both. Screening Colonoscopy For Beneficiaries at [facility] for Developing Colorectal Cancer Medicare provides coverage of a screening colonoscopy (HCPCS code ) once every 2 years for beneficiaries at high risk for developing colorectal cancer (i.e., at least 23 months have passed following the month in which the last covered screening colonoscopy [HCPCS code ] was performed). For Beneficiaries Not at [facility] for Developing Colorectal Cancer Medicare provides coverage of a screening colonoscopy (HCPCS code ) for beneficiaries who do not meet the criteria for being at high risk for developing colorectal cancer once every 10 years (i.e., at least 119 months have passed following the month in which the last covered screening colonoscopy [HCPCS code ] was performed). If the beneficiary otherwise qualifies to have a covered screening colonoscopy (HCPCS code ) based on the above but has had a covered screening flexible sigmoidoscopy (HCPCS code ), then Medicare may cover a screening colonoscopy (HCPCS code ) only after at least 47 months have passed following the month in which the last covered screening flexible sigmoidoscopy (HCPCS code ) was performed.

## 2019-07-11 NOTE — PROGRESS NOTES
Ino Pickens is a 75 y.o. female presenting for Annual Wellness Visit and Follow-up (6 mo fu)  . 1. Have you been to the ER, urgent care clinic since your last visit? Hospitalized since your last visit? Yes When: 12/2018 Where: Er in [place] Reason for visit: UTI    2. Have you seen or consulted any other health care providers outside of the [address] [place] [address] [place] since your last visit? Include any pap smears or colon screening. Yes When: 6/2019 Where: Dr. Anne Fox Reason for visit: uti    Fall Risk Assessment, last 12 mths 7/11/2019   Able to walk? Yes   Fall in past 12 months? No   Fall with injury? -   Number of falls in past 12 months -   Fall Risk Score -     Chief Complaint   Patient presents with    Annual Wellness Visit    Follow-up     6 mo fu       Depression Risk Factor Screening:     3 most recent PHQ Screens 7/11/2019   Little interest or pleasure in doing things Not at all   Feeling down, depressed, irritable, or hopeless Not at all   Total Score PHQ 2 0       Functional Ability and Level of Safety:     Activities of Daily Living  ADL Assessment 7/11/2019   Feeding yourself No Help Needed   Getting from bed to chair No Help Needed   Getting dressed No Help Needed   Bathing or showering No Help Needed   Walk across the room (includes cane/walker) No Help Needed   Using the telphone No Help Needed   Taking your medications No Help Needed   Preparing meals No Help Needed   Managing money (expenses/bills) No Help Needed   Moderately strenuous housework (laundry) No Help Needed   Shopping for personal items (toiletries/medicines) No Help Needed   Shopping for groceries No Help Needed   Driving No Help Needed   Climbing a flight of stairs No Help Needed   Getting to places beyond walking distances No Help Needed       Fall Risk  Fall Risk Assessment, last 12 mths 7/11/2019   Able to walk? Yes   Fall in past 12 months?  No   Fall with injury? -   Number of falls in past 12 months -   Fall Risk Score - Abuse Screen  Abuse Screening Questionnaire 7/11/2019   Do you ever feel afraid of your partner? N   Are you in a relationship with someone who physically or mentally threatens you? N   Is it safe for you to go home? Y         Patient Care Team   Patient Care Team:  Cuauhtemoc Downey MD as PCP - General (Internal Medicine)  Ana Pritchett MD as Physician (Cardiology)    Abuse Screening Questionnaire 7/11/2019   Do you ever feel afraid of your partner? N   Are you in a relationship with someone who physically or mentally threatens you? N   Is it safe for you to go home? Y       3 most recent PHQ Screens 7/11/2019   Little interest or pleasure in doing things Not at all   Feeling down, depressed, irritable, or hopeless Not at all   Total Score PHQ 2 0       There are no discontinued medications.

## 2019-07-12 ENCOUNTER — TELEPHONE (OUTPATIENT)
Dept: INTERNAL MEDICINE CLINIC | Age: 74
End: 2019-07-12

## 2019-07-12 LAB
EST. AVERAGE GLUCOSE BLD GHB EST-MCNC: 120 MG/DL
HBA1C MFR BLD: 5.8 % (ref 4.8–5.6)

## 2019-07-12 NOTE — TELEPHONE ENCOUNTER
Patient was seen yesterday however for got t ask your opinion on your thoughts about robotic bladder tacking done at [facility]? ? She is scheduled to have this done Or do you recommend any doctors in this area who can do this procedure? ?

## 2019-07-13 LAB — BACTERIA UR CULT: NO GROWTH

## 2019-07-17 ENCOUNTER — TELEPHONE (OUTPATIENT)
Dept: INTERNAL MEDICINE CLINIC | Age: 74
End: 2019-07-17

## 2019-07-17 RX ORDER — ERGOCALCIFEROL 1.25 MG/1
50000 CAPSULE ORAL
Qty: 12 CAP | Refills: 0 | Status: SHIPPED | OUTPATIENT
Start: 2019-07-17 | End: 2021-04-21 | Stop reason: ALTCHOICE

## 2019-07-17 NOTE — TELEPHONE ENCOUNTER
Patient informed, per Dr Diamond Pedroza:    Lab results overall look fine.  Your cholesterol profile is excellent.  Although your glucose is normal your A1c remains just above normal range.  This is consistent with impaired glucose tolerance or prediabetes.  Your vitamin D level remains below normal.  Your liver and kidney function are normal.  Your potassium level is just outside of normal range.  This is not believed to be clinically significant but I would ask you to   [facility] a follow-up potassium test in 2 weeks.  I will prescribe 50,000 units of vitamin D 2 for you to take 1 tablet weekly for 12 weeks.  Please schedule a follow-up vitamin D test at that time. Lab appt to recheck potassium scheduled for 7/31/19. Lab appt to recheck Vitamin D scheduled for 10/14/19.

## 2019-07-19 ENCOUNTER — HOSPITAL ENCOUNTER (OUTPATIENT)
Dept: ULTRASOUND IMAGING | Age: 74
Discharge: HOME | End: 2019-07-19
Attending: INTERNAL MEDICINE
Payer: MEDICARE

## 2019-07-19 DIAGNOSIS — R31.29 OTHER MICROSCOPIC HEMATURIA: ICD-10-CM

## 2019-07-19 DIAGNOSIS — R10.9 FLANK PAIN: ICD-10-CM

## 2019-07-19 PROCEDURE — 76770 US EXAM ABDO BACK WALL COMP: CPT

## 2019-07-31 ENCOUNTER — LAB ONLY (OUTPATIENT)
Dept: INTERNAL MEDICINE CLINIC | Age: 74
End: 2019-07-31

## 2019-07-31 DIAGNOSIS — E87.5 HYPERKALEMIA: ICD-10-CM

## 2019-07-31 LAB — POTASSIUM SERPL-SCNC: 5 MMOL/L (ref 3.6–5)

## 2019-08-23 ENCOUNTER — OFFICE VISIT (OUTPATIENT)
Dept: INTERNAL MEDICINE CLINIC | Age: 74
End: 2019-08-23

## 2019-08-23 VITALS
HEIGHT: 65 IN | BODY MASS INDEX: 28.62 KG/M2 | HEART RATE: 83 BPM | SYSTOLIC BLOOD PRESSURE: 138 MMHG | WEIGHT: 171.8 LBS | OXYGEN SATURATION: 94 % | TEMPERATURE: 98.5 F | RESPIRATION RATE: 18 BRPM | DIASTOLIC BLOOD PRESSURE: 78 MMHG

## 2019-08-23 DIAGNOSIS — R05.9 COUGH: Primary | ICD-10-CM

## 2019-08-23 RX ORDER — LEVOFLOXACIN 500 MG/1
500 TABLET, FILM COATED ORAL DAILY
Qty: 10 TAB | Refills: 0 | Status: SHIPPED | OUTPATIENT
Start: 2019-08-23 | End: 2019-09-02

## 2019-08-23 RX ORDER — BENZONATATE 200 MG/1
200 CAPSULE ORAL
Qty: 30 CAP | Refills: 0 | Status: SHIPPED | OUTPATIENT
Start: 2019-08-23 | End: 2019-09-02

## 2019-08-23 NOTE — PROGRESS NOTES
Chief Complaint   Patient presents with    Cold Symptoms     congestion,cough x 1 week     Visit Vitals  BP (!) 144/92 (BP 1 Location: Right arm, BP Patient Position: Sitting)   Pulse 83   Temp 98.5 °F (36.9 °C) (Oral)   Resp 18   Ht 5' 5\" (1.651 m)   Wt 171 lb 12.8 oz (77.9 kg)   SpO2 94%   BMI 28.59 kg/m²     1. Have you been to the ER, urgent care clinic since your last visit? Hospitalized since your last visit? No    2. Have you seen or consulted any other health care providers outside of the [address] [place] [address] since your last visit? Include any pap smears or colon screening.  No

## 2019-08-23 NOTE — PROGRESS NOTES
This note will not be viewable in [address] Lorri Smith is a 75 y.o. female and presents with Cold Symptoms (congestion,cough x 1 week)  . Subjective: Miranda Rodriguez presents today with complaint of cough and congestion for the past week. She states that when she coughs she has coughing spells that last for several minutes. She comes diaphoretic after this. Her cough is productive of scant amount of discolored sputum. She has some pleuritic chest discomfort in the left side. She denies any shortness of breath with exertion or at rest.  She has no PND orthopnea or pedal edema. She is not taking medication for this currently. Review of Systems  Constitutional:   Eyes:   negative for visual disturbance and irritation  ENT:   negative for tinnitus,sore throat,nasal congestion,ear pains. hoarseness  Respiratory:  negative for  hemoptysis, dyspnea,wheezing  CV:   negative for chest pain, palpitations, lower extremity edema  GI:   negative for nausea, vomiting, diarrhea, abdominal pain,melena  Endo:               negative for polyuria,polydipsia,polyphagia,heat intolerance  Genitourinary: negative for frequency, dysuria and hematuria  Integumentary: negative for rash and pruritus  Hematologic:  negative for easy bruising and gum/nose bleeding  Musculoskel: negative for myalgias, arthralgias, back pain, muscle weakness, joint pain  Neurological:  negative for headaches, dizziness, vertigo, memory problems and gait   Behavl/Psych: negative for feelings of anxiety, depression, mood changes    Past Medical History:   Diagnosis Date    Arthritis     hands    Cancer ([address]     basal cell of face, squamous, and melanoma    Colitis 04/21/2018    [facility]     Colitis, ischemic [facility]) 9/26/2017    Duodenal ulcer, perforated ([facility] 9/26/2017    Dyspepsia and other specified disorders of function of stomach     GERD (gastroesophageal reflux disease)     History of rectal bleeding 04/2018    Two episodes; first one 19 years ago - labeled as a \"possible ischemic attack\"    Hypercholesterolemia     Hyperlipidemia 9/26/2017    Hypertension     patient denies/no meds    IBS (irritable bowel syndrome) 9/26/2017    IGT (impaired glucose tolerance) 09/26/2017    Borderline Hgb A1C    Insomnia 9/26/2017    [facility] 9/26/2017    On statin therapy 9/26/2017    Osteopenia 9/26/2017    PUD (peptic ulcer disease)     Temporal arteritis ([facility]) 9/26/2017    Vitamin D deficiency 9/26/2017    Zoster 9/26/2017     Past Surgical History:   Procedure Laterality Date    ABDOMEN SURGERY PROC UNLISTED  1985    perforated ulcer    COLONOSCOPY N/A 8/8/2018    [facility]OSCOPY    COLONOSCOPY,DIAGNOSTIC  3/17/2015         COLONOSCOPY,DIAGNOSTIC  8/8/2018         HX COLONOSCOPY      HX ENDOSCOPY      HX GYN  1984    hysterectomy    HX GYN  1980's    laparoscopy    HX TONSILLECTOMY      UPPER GI ENDOSCOPY,BIOPSY  8/8/2018          Social History     Socioeconomic History    Marital status:      Spouse name: Not on file    Number of children: Not on file    Years of education: Not on file    Highest education level: Not on file   Tobacco Use    Smoking status: Never Smoker    Smokeless tobacco: Never Used   Substance and Sexual Activity    Alcohol use: Yes     Comment: rarely    Drug use: No     Family History   Problem Relation Age of Onset    Cancer Mother         uterine    Stroke Maternal Grandfather      Current Outpatient Medications   Medication Sig Dispense Refill    levoFLOXacin (LEVAQUIN) 500 mg tablet Take 1 Tab by mouth daily for 10 days. 10 Tab 0    benzonatate (TESSALON) 200 mg capsule Take 1 Cap by mouth three (3) times daily as needed for Cough for up to 10 days. 30 Cap 0    ergocalciferol (ERGOCALCIFEROL) 50,000 unit capsule Take 1 Cap by mouth every seven (7) days.  12 Cap 0    multivit,iron,minerals/lutein (CENTRUM SILVER ULTRA WOMEN'S PO) Take  by mouth.  ascorbic acid, vitamin C, (VITAMIN C) 500 mg tablet Take  by mouth.  Biotin 2,500 mcg cap Take 5,000 mg by mouth daily.  omeprazole (PRILOSEC) 20 mg capsule TAKE 1 CAPSULE EVERY DAY 90 Cap 3    simvastatin (ZOCOR) 20 mg tablet TAKE 1 TABLET AT BEDTIME 90 Tab 3    dicyclomine (BENTYL) 10 mg capsule Take 1 Cap by mouth four (4) times daily. 360 Cap 0    melatonin tab tablet Take 5 mg by mouth nightly.  CALCIUM CARBONATE/VITAMIN D3 (CALTRATE WITH VITAMIN D3 PO) Take 2 Tabs by mouth daily.  aspirin 81 mg tablet Take 81 mg by mouth.  cranberry 500 mg Cap Take 500 mg by mouth daily. Allergies   Allergen Reactions    Augmentin [Amoxicillin-Pot Clavulanate] Unknown (comments)     \"hard on my stomach\"    Tetracycline Unknown (comments)     \"funny feeling in my mouth. ..years ago\"       Objective:  Visit Vitals  /78 (BP 1 Location: Right arm, BP Patient Position: Sitting)   Pulse 83   Temp 98.5 °F (36.9 °C) (Oral)   Resp 18   Ht 5' 5\" (1.651 m)   Wt 171 lb 12.8 oz (77.9 kg)   SpO2 94%   BMI 28.59 kg/m²     Physical Exam:   General appearance - alert, well appearing, and in no distress  Mental status - alert, oriented to person, place, and time  EYE-JENNIFER, EOMI, fundi normal, corneas normal, no foreign bodies  ENT-ENT exam normal, no neck nodes or sinus tenderness  Nose - normal and patent, no erythema, discharge or polyps  Mouth - mucous membranes moist, pharynx normal without lesions  Neck - supple, no significant adenopathy   Chest -diffuse expiratory rhonchi especially in the right base without egophony  Heart - normal rate, regular rhythm, normal S1, S2, no murmurs, rubs, clicks or gallops   Abdomen - soft, nontender, nondistended, no masses or organomegaly  Lymph- no adenopathy palpable  Ext-peripheral pulses normal, no pedal edema, no clubbing or cyanosis  Skin-Warm and dry.  no hyperpigmentation, vitiligo, or suspicious lesions  Neuro -alert, oriented, normal speech, no focal findings or movement disorder noted  Musculoskeletal- FROM, no bony abnormalities, no point tenderness    No results found for this visit on 08/23/19. All results for lab orders may not have been returned by the time this encountered was closed. Assessment/Plan:       ICD-10-CM ICD-9-CM    1. Cough R05 786.2 XR CHEST PA LAT       Orders Placed This Encounter    XR CHEST PA LAT     Standing Status:   Future     Number of Occurrences:   1     Standing Expiration Date:   8/23/2020     Order Specific Question:   Reason for Exam     Answer:   cough    levoFLOXacin (LEVAQUIN) 500 mg tablet     Sig: Take 1 Tab by mouth daily for 10 days. Dispense:  10 Tab     Refill:  0    benzonatate (TESSALON) 200 mg capsule     Sig: Take 1 Cap by mouth three (3) times daily as needed for Cough for up to 10 days. Dispense:  30 Cap     Refill:  0       Follow-up and Dispositions    · Return for as scheduled. I have reviewed with the patient details of the assessment and plan and all questions were answered. Relevent patient education was performed. Verbal and/or written instructions (see AVS) provided. The most recent lab findings were reviewed with the patient. Plan was discussed with patient who verbal expressed understanding. An After Visit Summary was printed and given to the patient.       Abraham Le MD

## 2019-08-28 ENCOUNTER — OFFICE VISIT (OUTPATIENT)
Dept: INTERNAL MEDICINE CLINIC | Age: 74
End: 2019-08-28

## 2019-08-28 VITALS
HEIGHT: 65 IN | TEMPERATURE: 98.4 F | RESPIRATION RATE: 18 BRPM | OXYGEN SATURATION: 90 % | WEIGHT: 171 LBS | DIASTOLIC BLOOD PRESSURE: 82 MMHG | HEART RATE: 96 BPM | BODY MASS INDEX: 28.49 KG/M2 | SYSTOLIC BLOOD PRESSURE: 128 MMHG

## 2019-08-28 DIAGNOSIS — R05.9 COUGH: Primary | ICD-10-CM

## 2019-08-28 DIAGNOSIS — J18.9 PNEUMONIA OF RIGHT LOWER LOBE DUE TO INFECTIOUS ORGANISM: ICD-10-CM

## 2019-08-28 RX ORDER — CEFUROXIME AXETIL 500 MG/1
500 TABLET ORAL 2 TIMES DAILY
Qty: 20 TAB | Refills: 0 | Status: SHIPPED | OUTPATIENT
Start: 2019-08-28 | End: 2019-09-06 | Stop reason: ALTCHOICE

## 2019-08-28 RX ORDER — PREDNISONE 10 MG/1
20 TABLET ORAL
Qty: 10 TAB | Refills: 0 | Status: SHIPPED | OUTPATIENT
Start: 2019-08-28 | End: 2019-09-04 | Stop reason: SDUPTHER

## 2019-08-28 NOTE — PROGRESS NOTES
Reviewed record in preparation for visit and have obtained necessary documentation. Identified pt with two pt identifiers(name and ). Chief Complaint   Patient presents with    Cough    Wheezing    Chest Congestion        Coordination of Care Questionnaire:  :     1) Have you been to an emergency room, urgent care clinic since your last visit? No     Hospitalized since your last visit? No   :          2) Have you seen or consulted any other health care providers outside of [address] [place] [address] since your last visit?  No

## 2019-08-28 NOTE — PROGRESS NOTES
This note will not be viewable in [address] Sarah Desouza is a 75 y.o. female and presents with Cough; Wheezing; and Chest Congestion  . Subjective: Adan Has presents today for follow-up of cough. She has been on Levaquin 500 mg daily for 5 days now. She continues to have significant cough with wheezing and congestion. She has been taking Mucinex and Tessalon Perles. She denies fever chills or rigors. She denies any pleuritic chest pain. Review of Systems  Constitutional:   Eyes:   negative for visual disturbance and irritation  ENT:   negative for tinnitus,sore throat,nasal congestion,ear pains. hoarseness  Respiratory:  negative for  hemoptysis, dyspnea,wheezing  CV:   negative for chest pain, palpitations, lower extremity edema  GI:   negative for nausea, vomiting, diarrhea, abdominal pain,melena  Endo:               negative for polyuria,polydipsia,polyphagia,heat intolerance  Genitourinary: negative for frequency, dysuria and hematuria  Integumentary: negative for rash and pruritus  Hematologic:  negative for easy bruising and gum/nose bleeding  Musculoskel: negative for myalgias, arthralgias, back pain, muscle weakness, joint pain  Neurological:  negative for headaches, dizziness, vertigo, memory problems and gait   Behavl/Psych: negative for feelings of anxiety, depression, mood changes    Past Medical History:   Diagnosis Date    Arthritis     hands    Cancer ([facility] [address]     basal cell of face, squamous, and melanoma    [facility]21/2018    [facility]     Colitis, ischemic ([facility] [address] 9/26/2017    Duodenal ulcer, perforated ([facility] [address] 9/26/2017    Dyspepsia and other specified disorders of function of stomach     GERD (gastroesophageal reflux disease)     History of rectal bleeding 04/2018    Two episodes; first one 19 years ago - labeled as a \"possible ischemic attack\"    Hypercholesterolemia     Hyperlipidemia 9/26/2017    Hypertension     patient denies/no meds    IBS (irritable bowel syndrome) 9/26/2017    IGT (impaired glucose tolerance) 09/26/2017    Borderline Hgb A1C    Insomnia 9/26/2017    Melanoma ([facility] [address] 9/26/2017    On statin therapy 9/26/2017    Osteopenia 9/26/2017    PUD (peptic ulcer disease)     Temporal arteritis ([facility] [address] 9/26/2017    Vitamin D deficiency 9/26/2017    Zoster 9/26/2017     Past Surgical History:   Procedure Laterality Date    ABDOMEN SURGERY PROC UNLISTED  1985    perforated ulcer    COLONOSCOPY N/A 8/8/2018    COL[facility] performed [facility]COPY    COLONOSCOPY,DIAGNOSTIC  3/17/2015         COLONOSCOPY,DIAGNOSTIC  8/8/2018         HX COLONOSCOPY      HX ENDOSCOPY      HX GYN  1984    hysterectomy    HX GYN  1980's    laparoscopy    HX TONSILLECTOMY      UPPER GI ENDOSCOPY,BIOPSY  8/8/2018          Social History     Socioeconomic History    Marital status:      Spouse name: Not on file    Number of children: Not on file    Years of education: Not on file    Highest education level: Not on file   Tobacco Use    Smoking status: Never Smoker    Smokeless tobacco: Never Used   Substance and Sexual Activity    Alcohol use: Yes     Comment: rarely    Drug use: No     Family History   Problem Relation Age of Onset    Cancer Mother         uterine    Stroke Maternal Grandfather      Current Outpatient Medications   Medication Sig Dispense Refill    levoFLOXacin (LEVAQUIN) 500 mg tablet Take 1 Tab by mouth daily for 10 days. 10 Tab 0    benzonatate (TESSALON) 200 mg capsule Take 1 Cap by mouth three (3) times daily as needed for Cough for up to 10 days. 30 Cap 0    ergocalciferol (ERGOCALCIFEROL) 50,000 unit capsule Take 1 Cap by mouth every seven (7) days. 12 Cap 0    multivit,iron,minerals/lutein (CENTRUM SILVER ULTRA WOMEN'S PO) Take  by mouth.  ascorbic acid, vitamin C, (VITAMIN C) 500 mg tablet Take  by mouth.  Biotin 2,500 mcg cap Take 5,000 mg by mouth daily.       omeprazole (PRILOSEC) 20 mg capsule TAKE 1 CAPSULE EVERY DAY 90 Cap 3    simvastatin (ZOCOR) 20 mg tablet TAKE 1 TABLET AT BEDTIME 90 Tab 3    dicyclomine (BENTYL) 10 mg capsule Take 1 Cap by mouth four (4) times daily. (Patient taking differently: Take 10 mg by mouth four (4) times daily as needed.) 360 Cap 0    melatonin tab tablet Take 5 mg by mouth nightly.  aspirin 81 mg tablet Take 81 mg by mouth.  cranberry 500 mg Cap Take 500 mg by mouth daily.  CALCIUM CARBONATE/VITAMIN D3 (CALTRATE WITH VITAMIN D3 PO) Take 2 Tabs by mouth daily. Allergies   Allergen Reactions    Augmentin [Amoxicillin-Pot Clavulanate] Unknown (comments)     \"hard on my stomach\"    Tetracycline Unknown (comments)     \"funny feeling in my mouth. ..years ago\"       Objective:  Visit Vitals  /82 (BP 1 Location: Left arm, BP Patient Position: Sitting)   Pulse 96   Temp 98.4 °F (36.9 °C) (Oral)   Resp 18   Ht 5' 5\" (1.651 m)   Wt 171 lb (77.6 kg)   SpO2 90%   BMI 28.46 kg/m²     Physical Exam:   General appearance - alert, well appearing, and in no distress  Mental status - alert, oriented to person, place, and time  EYE-JENNIFER, EOMI, fundi normal, corneas normal, no foreign bodies  ENT-ENT exam normal, no neck nodes or sinus tenderness  Nose - normal and patent, no erythema, discharge or polyps  Mouth - mucous membranes moist, pharynx normal without lesions  Neck - supple, no significant adenopathy   Chest -coarse airway sounds with diffuse rhonchi especially in the right base. Heart - normal rate, regular rhythm, normal S1, S2, no murmurs, rubs, clicks or gallops   Abdomen - soft, nontender, nondistended, no masses or organomegaly  Lymph- no adenopathy palpable  Ext-peripheral pulses normal, no pedal edema, no clubbing or cyanosis  Skin-Warm and dry.  no hyperpigmentation, vitiligo, or suspicious lesions  Neuro -alert, oriented, normal speech, no focal findings or movement disorder noted  Musculoskeletal- FROM, no bony abnormalities, no point tenderness    No results found for this visit on 08/28/19. All results for lab orders may not have been returned by the time this encountered was closed. Assessment/Plan:       ICD-10-CM ICD-9-CM    1. Cough R05 786.2 XR CHEST PA LAT       Orders Placed This Encounter    XR CHEST PA LAT     Standing Status:   Future     Standing Expiration Date:   8/28/2020     Order Specific Question:   Reason for Exam     Answer:   cough       Plan:    Chest x-ray confirms development of right lower lobe pneumonia. The patient is already on Levaquin currently. We will add Ceftin 500 mg twice daily for 10 days. She will be placed on prednisone 20 mg daily for 5 days. Return to clinic in 10 days for reevaluation. She will call in the interim if she develops any complications. I have advised her not to take her trip as she was planning on driving to [place] at the end of this week. I have reviewed with the patient details of the assessment and plan and all questions were answered. Relevent patient education was performed. Verbal and/or written instructions (see AVS) provided. The most recent lab findings were reviewed with the patient. Plan was discussed with patient who verbal expressed understanding. An After Visit Summary was printed and given to the patient.       Ligia Herndon MD

## 2019-09-03 ENCOUNTER — TELEPHONE (OUTPATIENT)
Dept: INTERNAL MEDICINE CLINIC | Age: 74
End: 2019-09-03

## 2019-09-03 NOTE — TELEPHONE ENCOUNTER
Patient calling stating she is a little better. She finished the prednisone on Sunday still wheezing, congested and coughing. She is coughing up some mucus. She was wondering if she should get another prescription of prednisone?

## 2019-09-04 DIAGNOSIS — J18.9 PNEUMONIA OF RIGHT LOWER LOBE DUE TO INFECTIOUS ORGANISM: ICD-10-CM

## 2019-09-04 DIAGNOSIS — R05.9 COUGH: ICD-10-CM

## 2019-09-04 RX ORDER — PREDNISONE 10 MG/1
20 TABLET ORAL
Qty: 10 TAB | Refills: 0 | Status: SHIPPED | OUTPATIENT
Start: 2019-09-04 | End: 2019-09-06 | Stop reason: SDUPTHER

## 2019-09-06 ENCOUNTER — OFFICE VISIT (OUTPATIENT)
Dept: INTERNAL MEDICINE CLINIC | Age: 74
End: 2019-09-06

## 2019-09-06 VITALS
DIASTOLIC BLOOD PRESSURE: 82 MMHG | TEMPERATURE: 98.4 F | HEART RATE: 91 BPM | WEIGHT: 170.2 LBS | OXYGEN SATURATION: 94 % | HEIGHT: 65 IN | BODY MASS INDEX: 28.36 KG/M2 | RESPIRATION RATE: 20 BRPM | SYSTOLIC BLOOD PRESSURE: 128 MMHG

## 2019-09-06 DIAGNOSIS — J18.9 PNEUMONIA OF RIGHT LOWER LOBE DUE TO INFECTIOUS ORGANISM: Primary | ICD-10-CM

## 2019-09-06 RX ORDER — BENZONATATE 200 MG/1
200 CAPSULE ORAL
Qty: 30 CAP | Refills: 0 | Status: SHIPPED | OUTPATIENT
Start: 2019-09-06 | End: 2019-09-16

## 2019-09-06 RX ORDER — DOXYCYCLINE 100 MG/1
100 TABLET ORAL 2 TIMES DAILY
Qty: 20 TAB | Refills: 0 | Status: SHIPPED | OUTPATIENT
Start: 2019-09-06 | End: 2019-09-16

## 2019-09-06 NOTE — PROGRESS NOTES
This note will not be viewable in [address] Renetta Cavazos is a 75 y.o. female and presents with Follow-up and Pneumonia  . Subjective:    Mrs. Radha Driver presents today for follow-up of right lower her cough is improved but she still has a significant cough with congestion and some mild wheezing. She has completed a course of Ceftin having already previously taken a course of Levaquin. She has also completed a course of prednisone. She remains on Mucinex daily. She has taken [employer] for cough. She denies any pleuritic chest pain or shortness of breath. She denies fever chills or rigors. Review of Systems  Constitutional:   Eyes:   negative for visual disturbance and irritation  ENT:   negative for tinnitus,sore throat,nasal congestion,ear pains. hoarseness  Respiratory:  negative for cough, hemoptysis, dyspnea,wheezing  CV:   negative for chest pain, palpitations, lower extremity edema  GI:   negative for nausea, vomiting, diarrhea, abdominal pain,melena  Endo:               negative for polyuria,polydipsia,polyphagia,heat intolerance  Genitourinary: negative for frequency, dysuria and hematuria  Integumentary: negative for rash and pruritus  Hematologic:  negative for easy bruising and gum/nose bleeding  Musculoskel: negative for myalgias, arthralgias, back pain, muscle weakness, joint pain  Neurological:  negative for headaches, dizziness, vertigo, memory problems and gait   Behavl/Psych: negative for feelings of anxiety, depression, mood changes    Past Medical History:   Diagnosis Date    Arthritis     hands    Cancer ([facility] [address]     basal cell of face, squamous, and melanoma    Colitis 04/21/2018    [facility]     Colitis, ischemic [facility]) 9/26/2017    Duodenal ulcer, perforated ([facility] [address] 9/26/2017    Dyspepsia and other specified disorders of function of stomach     GERD (gastroesophageal reflux disease)     History of rectal bleeding 04/2018    Two episodes; first one 19 years ago - labeled as a \"possible ischemic attack\"    Hypercholesterolemia     Hyperlipidemia 9/26/2017    Hypertension     patient denies/no meds    IBS (irritable bowel syndrome) 9/26/2017    IGT (impaired glucose tolerance) 09/26/2017    Borderline Hgb A1C    Insomnia 9/26/2017    Melanoma ([facility] [address] 9/26/2017    On statin therapy 9/26/2017    Osteopenia 9/26/2017    Pneumonia of right lung due to infectious organism 9/6/2019    PUD (peptic ulcer disease)     Temporal arteritis ([facility] [address] 9/26/2017    Vitamin D deficiency 9/26/2017    Zoster 9/26/2017     Past Surgical History:   Procedure Laterality Date    ABDOMEN SURGERY PROC UNLISTED  1985    perforated ulcer    COLONOSCOPY N/A 8/8/2018    [facility]    COLONOSCOPY,DIAGNOSTIC  3/17/2015         COLONOSCOPY,DIAGNOSTIC  8/8/2018         HX COLONOSCOPY      HX ENDOSCOPY      HX GYN  1984    hysterectomy    HX GYN  1980's    laparoscopy    HX TONSILLECTOMY      UPPER GI ENDOSCOPY,BIOPSY  8/8/2018          Social History     Socioeconomic History    Marital status:      Spouse name: Not on file    Number of children: Not on file    Years of education: Not on file    Highest education level: Not on file   Tobacco Use    Smoking status: Never Smoker    Smokeless tobacco: Never Used   Substance and Sexual Activity    Alcohol use: Yes     Comment: rarely    Drug use: No     Family History   Problem Relation Age of Onset    Cancer Mother         uterine    Stroke Maternal Grandfather      Current Outpatient Medications   Medication Sig Dispense Refill    B.infantis-B.ani-B.long-B.bifi (PROBIOTIC 4X) 10-15 mg TbEC Take  by mouth.  doxycycline (ADOXA) 100 mg tablet Take 1 Tab by mouth two (2) times a day for 10 days. 20 Tab 0    benzonatate (TESSALON) 200 mg capsule Take 1 Cap by mouth three (3) times daily as needed for Cough for up to 10 days.  30 Cap 0    ergocalciferol (ERGOCALCIFEROL) 50,000 unit capsule Take 1 Cap by mouth every seven (7) days. 12 Cap 0    multivit,iron,minerals/lutein (CENTRUM SILVER ULTRA WOMEN'S PO) Take  by mouth.  ascorbic acid, vitamin C, (VITAMIN C) 500 mg tablet Take  by mouth.  Biotin 2,500 mcg cap Take 5,000 mg by mouth daily.  omeprazole (PRILOSEC) 20 mg capsule TAKE 1 CAPSULE EVERY DAY 90 Cap 3    simvastatin (ZOCOR) 20 mg tablet TAKE 1 TABLET AT BEDTIME 90 Tab 3    dicyclomine (BENTYL) 10 mg capsule Take 1 Cap by mouth four (4) times daily. (Patient taking differently: Take 10 mg by mouth four (4) times daily as needed.) 360 Cap 0    melatonin tab tablet Take 5 mg by mouth nightly.  CALCIUM CARBONATE/VITAMIN D3 (CALTRATE WITH VITAMIN D3 PO) Take 2 Tabs by mouth daily.  aspirin 81 mg tablet Take 81 mg by mouth.  cranberry 500 mg Cap Take 500 mg by mouth daily. Allergies   Allergen Reactions    Augmentin [Amoxicillin-Pot Clavulanate] Unknown (comments)     \"hard on my stomach\"    Tetracycline Unknown (comments)     \"funny feeling in my mouth. ..years ago\"       Objective:  Visit Vitals  /82 (BP 1 Location: Left arm, BP Patient Position: Sitting)   Pulse 91   Temp 98.4 °F (36.9 °C) (Oral)   Resp 20   Ht 5' 5\" (1.651 m)   Wt 170 lb 3.2 oz (77.2 kg)   SpO2 94%   BMI 28.32 kg/m²     Physical Exam:   General appearance - alert, well appearing, and in no distress  Mental status - alert, oriented to person, place, and time  EYE-JENNIFER, EOMI, fundi normal, corneas normal, no foreign bodies  ENT-ENT exam normal, no neck nodes or sinus tenderness  Nose - normal and patent, no erythema, discharge or polyps  Mouth - mucous membranes moist, pharynx normal without lesions  Neck - supple, no significant adenopathy   Chest -few rales right lower base and few scant rhonchi throughout, resonant to percussion, no accessory muscle use with breathing  Heart - normal rate, regular rhythm, normal S1, S2, no murmurs, rubs, clicks or gallops   Abdomen - soft, nontender, nondistended, no masses or organomegaly  Lymph- no adenopathy palpable  Ext-peripheral pulses normal, no pedal edema, no clubbing or cyanosis  Skin-Warm and dry. no hyperpigmentation, vitiligo, or suspicious lesions  Neuro -alert, oriented, normal speech, no focal findings or movement disorder noted  Musculoskeletal- FROM, no bony abnormalities, no point tenderness    No results found for this visit on 09/06/19. All results for lab orders may not have been returned by the time this encountered was closed. Assessment/Plan:       ICD-10-CM ICD-9-CM    1. Pneumonia of right lower lobe due to infectious organism ([address] J18.1 486 doxycycline (ADOXA) 100 mg tablet      benzonatate (TESSALON) 200 mg capsule       Orders Placed This Encounter    B.infantis-B.ani-B.long-B.bifi (PROBIOTIC 4X) 10-15 mg TbEC     Sig: Take  by mouth.  doxycycline (ADOXA) 100 mg tablet     Sig: Take 1 Tab by mouth two (2) times a day for 10 days. Dispense:  20 Tab     Refill:  0    benzonatate (TESSALON) 200 mg capsule     Sig: Take 1 Cap by mouth three (3) times daily as needed for Cough for up to 10 days. Dispense:  30 Cap     Refill:  0       Follow-up and Dispositions    · Return in about 2 weeks (around 9/20/2019) for follow up. Plan:    The patient has completed a course of Levaquin and Ceftin. Will extend antibiotic coverage with doxycycline twice daily for 10 days. Discussed pros and cons of continuing antibiotic therapy versus possible side effects of being on prolonged antibiotics. We will continue to over-the-counter probiotic while on antibiotics at this time. She will continue Tessalon Perles for cough as needed. Clinically the patient is improved. Plan follow-up in 2 weeks for reevaluation. She will call in the interim if she develops any problems. I have reviewed with the patient details of the assessment and plan and all questions were answered.  Relevent patient education was performed. Verbal and/or written instructions (see AVS) provided. The most recent lab findings were reviewed with the patient. Plan was discussed with patient who verbal expressed understanding. An After Visit Summary was printed and given to the patient.       Betty Jaramillo MD

## 2019-09-06 NOTE — PROGRESS NOTES
Reviewed record in preparation for visit and have obtained necessary documentation. Identified pt with two pt identifiers(name and ). Chief Complaint   Patient presents with    Follow-up    Pneumonia        Coordination of Care Questionnaire:  :     1) Have you been to an emergency room, urgent care clinic since your last visit? No     Hospitalized since your last visit? No             2) Have you seen or consulted any other health care providers outside of [address] [place] [address] since your last visit?   No

## 2019-09-16 ENCOUNTER — TELEPHONE (OUTPATIENT)
Dept: INTERNAL MEDICINE CLINIC | Age: 74
End: 2019-09-16

## 2019-09-16 DIAGNOSIS — J18.9 PNEUMONIA OF RIGHT LOWER LOBE DUE TO INFECTIOUS ORGANISM: ICD-10-CM

## 2019-09-16 DIAGNOSIS — R05.9 COUGH: ICD-10-CM

## 2019-09-16 RX ORDER — ALBUTEROL SULFATE 90 UG/1
2 AEROSOL, METERED RESPIRATORY (INHALATION)
Qty: 1 INHALER | Refills: 0 | Status: SHIPPED | OUTPATIENT
Start: 2019-09-16 | End: 2019-10-21 | Stop reason: SDUPTHER

## 2019-09-16 RX ORDER — PREDNISONE 10 MG/1
20 TABLET ORAL
Qty: 10 TAB | Refills: 0 | Status: SHIPPED | OUTPATIENT
Start: 2019-09-16 | End: 2019-09-21

## 2019-09-16 NOTE — TELEPHONE ENCOUNTER
Patient calling stating she is still coughing, wheezing, coughing up mucus,  ? SI. Patient will finish Doxycycline tonighe and s taking Mucus. She has a follow up appt on 9/20/19. She feels that she needs to be seen sooner. Please advise.

## 2019-09-16 NOTE — TELEPHONE ENCOUNTER
Patient informed. She will start Prednisone and Pro-Air inhaler and has a follow up appointment scheduled for this Friday, September 20, 2019.

## 2019-09-20 ENCOUNTER — OFFICE VISIT (OUTPATIENT)
Dept: INTERNAL MEDICINE CLINIC | Age: 74
End: 2019-09-20

## 2019-09-20 VITALS
RESPIRATION RATE: 16 BRPM | HEART RATE: 63 BPM | WEIGHT: 169 LBS | OXYGEN SATURATION: 94 % | BODY MASS INDEX: 28.16 KG/M2 | DIASTOLIC BLOOD PRESSURE: 78 MMHG | TEMPERATURE: 98.1 F | SYSTOLIC BLOOD PRESSURE: 132 MMHG | HEIGHT: 65 IN

## 2019-09-20 DIAGNOSIS — J18.9 PNEUMONIA OF RIGHT LOWER LOBE DUE TO INFECTIOUS ORGANISM: Primary | ICD-10-CM

## 2019-09-20 NOTE — PROGRESS NOTES
This note will not be viewable in [address] Nadine Slaes is a 75 y.o. female and presents with Cough (still coughing up yellow mucus, wheezing)  . Subjective:  Mrs. Ulysses Hollingshead presents today for follow-up of pneumonia. She is completed 2 rounds of antibiotics and a steroid taper. Her cough persists and she is still wheezing somewhat. She denies any shortness of breath, pleuritic chest pain, fever, chills. Review of Systems  Constitutional:   Eyes:   negative for visual disturbance and irritation  ENT:   negative for tinnitus,sore throat,nasal congestion,ear pains. hoarseness  Respiratory:  negative for hemoptysis, dyspnea  CV:   negative for chest pain, palpitations, lower extremity edema  GI:   negative for nausea, vomiting, diarrhea, abdominal pain,melena  Endo:               negative for polyuria,polydipsia,polyphagia,heat intolerance  Genitourinary: negative for frequency, dysuria and hematuria  Integumentary: negative for rash and pruritus  Hematologic:  negative for easy bruising and gum/nose bleeding  Musculoskel: negative for myalgias, arthralgias, back pain, muscle weakness, joint pain  Neurological:  negative for headaches, dizziness, vertigo, memory problems and gait   Behavl/Psych: negative for feelings of anxiety, depression, mood changes    Past Medical History:   Diagnosis Date    Arthritis     hands    Cancer ([facility]     basal cell of face, squamous, and melanoma    Colitis 04/21/2018    [facility]     Colitis, ischemic ([facility] 9/26/2017    Duodenal ulcer, perforated ([facility] 9/26/2017    Dyspepsia and other specified disorders of function of stomach     GERD (gastroesophageal reflux disease)     History of rectal bleeding 04/2018    Two episodes; first one 19 years ago - labeled as a \"possible ischemic attack\"    Hypercholesterolemia     Hyperlipidemia 9/26/2017    Hypertension     patient denies/no meds    IBS (irritable bowel syndrome) 9/26/2017    IGT (impaired glucose tolerance) 09/26/2017    Borderline Hgb A1C    Insomnia 9/26/2017    Melanoma ([facility] 9/26/2017    On statin therapy 9/26/2017    Osteopenia 9/26/2017    Pneumonia of right lung due to infectious organism 9/6/2019    PUD (peptic ulcer disease)     Temporal arteritis ([facility] 9/26/2017    Vitamin D deficiency 9/26/2017    Zoster 9/26/2017     Past Surgical History:   Procedure Laterality Date    ABDOMEN SURGERY PROC UNLISTED  1985    perforated ulcer    COLONOSCOPY N/A 8/8/2018    COL[facility] performed by Han [facility]OSCOPY    COLONOSCOPY,DIAGNOSTIC  3/17/2015         COLONOSCOPY,DIAGNOSTIC  8/8/2018         HX COLONOSCOPY      HX ENDOSCOPY      HX GYN  1984    hysterectomy    HX GYN  1980's    laparoscopy    HX TONSILLECTOMY      UPPER GI ENDOSCOPY,BIOPSY  8/8/2018          Social History     Socioeconomic History    Marital status:      Spouse name: Not on file    Number of children: Not on file    Years of education: Not on file    Highest education level: Not on file   Tobacco Use    Smoking status: Never Smoker    Smokeless tobacco: Never Used   Substance and Sexual Activity    Alcohol use: Yes     Comment: rarely    Drug use: No     Family History   Problem Relation Age of Onset    Cancer Mother         uterine    Stroke Maternal Grandfather      Current Outpatient Medications   Medication Sig Dispense Refill    predniSONE (DELTASONE) 10 mg tablet Take 20 mg by mouth daily (with breakfast) for 5 days. 10 Tab 0    albuterol (PROVENTIL HFA, VENTOLIN HFA, PROAIR HFA) 90 mcg/actuation inhaler Take 2 Puffs by inhalation every six (6) hours as needed for Wheezing. 1 Inhaler 0    B.infantis-B.ani-B.long-B.bifi (PROBIOTIC 4X) 10-15 mg TbEC Take  by mouth.  ergocalciferol (ERGOCALCIFEROL) 50,000 unit capsule Take 1 Cap by mouth every seven (7) days. 12 Cap 0    multivit,iron,minerals/lutein (CENTRUM SILVER ULTRA WOMEN'S PO) Take  by mouth.  ascorbic acid, vitamin C, (VITAMIN C) 500 mg tablet Take  by mouth.  Biotin 2,500 mcg cap Take 5,000 mg by mouth daily.  omeprazole (PRILOSEC) 20 mg capsule TAKE 1 CAPSULE EVERY DAY 90 Cap 3    simvastatin (ZOCOR) 20 mg tablet TAKE 1 TABLET AT BEDTIME 90 Tab 3    dicyclomine (BENTYL) 10 mg capsule Take 1 Cap by mouth four (4) times daily. (Patient taking differently: Take 10 mg by mouth four (4) times daily as needed.) 360 Cap 0    melatonin tab tablet Take 5 mg by mouth nightly.  CALCIUM CARBONATE/VITAMIN D3 (CALTRATE WITH VITAMIN D3 PO) Take 2 Tabs by mouth daily.  aspirin 81 mg tablet Take 81 mg by mouth.  cranberry 500 mg Cap Take 500 mg by mouth daily. Allergies   Allergen Reactions    Augmentin [Amoxicillin-Pot Clavulanate] Unknown (comments)     \"hard on my stomach\"    Tetracycline Unknown (comments)     \"funny feeling in my mouth. ..years ago\"       Objective:  Visit Vitals  /78 (BP 1 Location: Right arm, BP Patient Position: Sitting)   Pulse 63   Temp 98.1 °F (36.7 °C) (Oral)   Resp 16   Ht 5' 5\" (1.651 m)   Wt 169 lb (76.7 kg)   SpO2 94%   BMI 28.12 kg/m²     Physical Exam:   General appearance - alert, well appearing, and in no distress  Mental status - alert, oriented to person, place, and time  EYE-JENNIFER, EOMI, fundi normal, corneas normal, no foreign bodies  ENT-ENT exam normal, no neck nodes or sinus tenderness  Nose - normal and patent, no erythema, discharge or polyps  Mouth - mucous membranes moist, pharynx normal without lesions  Neck - supple, no significant adenopathy   Chest - clear to auscultation, no wheezes, rales or rhonchi, symmetric air entry   Heart - normal rate, regular rhythm, normal S1, S2, no murmurs, rubs, clicks or gallops   Abdomen - soft, nontender, nondistended, no masses or organomegaly  Lymph- no adenopathy palpable  Ext-peripheral pulses normal, no pedal edema, no clubbing or cyanosis  Skin-Warm and dry.  no hyperpigmentation, vitiligo, or suspicious lesions  Neuro -alert, oriented, normal speech, no focal findings or movement disorder noted  Musculoskeletal- FROM, no bony abnormalities, no point tenderness    No results found for this visit on 09/20/19. All results for lab orders may not have been returned by the time this encountered was closed. Assessment/Plan:       ICD-10-CM ICD-9-CM    1. Pneumonia of right lower lobe due to infectious organism ([facility] J18.1 486 XR CHEST PA LAT       Orders Placed This Encounter    XR CHEST PA LAT     Standing Status:   Future     Number of Occurrences:   1     Standing Expiration Date:   9/20/2020     Order Specific Question:   Reason for Exam     Answer:   RLL pneumonia       Plan:    Follow-up chest x-ray shows complete resolution of right lower lobe pneumonia. Patient has some residual cough and wheezing. However overall clinically she has improved. She has completed 2 rounds of antibiotics and is now finishing a steroid taper. She will continue Pro Air inhaler as needed for cough or wheezing. She should expect the cough to improve over the next few weeks. If her symptoms worsen she will follow-up for evaluation. I have reviewed with the patient details of the assessment and plan and all questions were answered. Relevent patient education was performed. Verbal and/or written instructions (see AVS) provided. The most recent lab findings were reviewed with the patient. Plan was discussed with patient who verbal expressed understanding. An After Visit Summary was printed and given to the patient.       Lorri Barry MD

## 2019-09-20 NOTE — PROGRESS NOTES
Chief Complaint   Patient presents with    Cough     still coughing up yellow mucus, wheezing         1. Have you been to the ER, urgent care clinic since your last visit? Hospitalized since your last visit? no    2. Have you seen or consulted any other health care providers outside of the [address] [place] [address] since your last visit? Include any pap smears or colon screening.   no

## 2019-09-23 ENCOUNTER — TELEPHONE (OUTPATIENT)
Dept: INTERNAL MEDICINE CLINIC | Age: 74
End: 2019-09-23

## 2019-09-23 NOTE — TELEPHONE ENCOUNTER
Patient calling she thinks she might have a sinus infection. Most of the times when she blows her nose it is yellow or clear. She forgot to let you know about this when she saw you on Friday. Please advise. Mumtaz started taking macrobid Saturday because she thought she was getting a UTI. She wanted you to know.

## 2019-09-27 ENCOUNTER — OFFICE VISIT (OUTPATIENT)
Dept: INTERNAL MEDICINE CLINIC | Age: 74
End: 2019-09-27

## 2019-09-27 VITALS
HEIGHT: 65 IN | WEIGHT: 165.5 LBS | SYSTOLIC BLOOD PRESSURE: 128 MMHG | HEART RATE: 71 BPM | TEMPERATURE: 98.4 F | DIASTOLIC BLOOD PRESSURE: 86 MMHG | OXYGEN SATURATION: 98 % | RESPIRATION RATE: 16 BRPM | BODY MASS INDEX: 27.57 KG/M2

## 2019-09-27 DIAGNOSIS — J45.41 MODERATE PERSISTENT ASTHMATIC BRONCHITIS WITH ACUTE EXACERBATION: Primary | ICD-10-CM

## 2019-09-27 RX ORDER — ALPRAZOLAM 0.25 MG/1
0.25 TABLET ORAL
Qty: 30 TAB | Refills: 0 | Status: SHIPPED | OUTPATIENT
Start: 2019-09-27 | End: 2021-04-21 | Stop reason: ALTCHOICE

## 2019-09-27 RX ORDER — CEFUROXIME AXETIL 500 MG/1
500 TABLET ORAL 2 TIMES DAILY
Qty: 20 TAB | Refills: 0 | Status: SHIPPED | OUTPATIENT
Start: 2019-09-27 | End: 2019-10-07

## 2019-09-27 RX ORDER — PREDNISONE 10 MG/1
TABLET ORAL
Qty: 31 TAB | Refills: 0 | Status: SHIPPED | OUTPATIENT
Start: 2019-09-27 | End: 2019-10-31 | Stop reason: ALTCHOICE

## 2019-09-27 NOTE — PROGRESS NOTES
Reviewed record in preparation for visit and have obtained necessary documentation. Identified pt with two pt identifiers(name and ). Chief Complaint   Patient presents with    Cough    Wheezing        Coordination of Care Questionnaire:  :     1) Have you been to an emergency room, urgent care clinic since your last visit? No     Hospitalized since your last visit? No               2) Have you seen or consulted any other health care providers outside of [address] [place] [address] since your last visit?  No

## 2019-09-27 NOTE — PROGRESS NOTES
This note will not be viewable in [address] Kee Nj is a 75 y.o. female and presents with Cough and Wheezing  . Subjective: Rolanda Abbott presents today with persistent cough and wheezing. Her cough is productive of scant amount of discolored sputum. She has had discolored yellow drainage from her sinuses. She has been on 2 rounds of antibiotics and a round of steroids. She has been using her albuterol inhaler 3 times daily. She denies fever chills or rigors. She has no pleuritic chest pain. She has had 2 sets of chest x-rays done. Review of Systems  Constitutional:   Eyes:   negative for visual disturbance and irritation  ENT:   negative for tinnitus,sore throat,nasal congestion,ear pains. hoarseness  Respiratory:  negative for  hemoptysis  CV:   negative for chest pain, palpitations, lower extremity edema  GI:   negative for nausea, vomiting, diarrhea, abdominal pain,melena  Endo:               negative for polyuria,polydipsia,polyphagia,heat intolerance  Genitourinary: negative for frequency, dysuria and hematuria  Integumentary: negative for rash and pruritus  Hematologic:  negative for easy bruising and gum/nose bleeding  Musculoskel: negative for myalgias, arthralgias, back pain, muscle weakness, joint pain  Neurological:  negative for headaches, dizziness, vertigo, memory problems and gait   Behavl/Psych: negative for feelings of anxiety, depression, mood changes    Past Medical History:   Diagnosis Date    Arthritis     hands    Cancer ([address]     basal cell of face, squamous, and melanoma    Colitis 04/21/2018    [facility]     Colitis, ischemic [facility]) 9/26/2017    Duodenal ulcer, perforated ([facility] 9/26/2017    Dyspepsia and other specified disorders of function of stomach     GERD (gastroesophageal reflux disease)     History of rectal bleeding 04/2018    Two episodes; first one 19 years ago - labeled as a \"possible ischemic attack\"    Hypercholesterolemia     Hyperlipidemia 9/26/2017    Hypertension     patient denies/no meds    IBS (irritable bowel syndrome) 9/26/2017    IGT (impaired glucose tolerance) 09/26/2017    Borderline Hgb A1C    Insomnia 9/26/2017    Melanoma ([facility] 9/26/2017    On statin therapy 9/26/2017    Osteopenia 9/26/2017    Pneumonia of right lung due to infectious organism 9/6/2019    PUD (peptic ulcer disease)     Temporal arteritis ([facility] 9/26/2017    Vitamin D deficiency 9/26/2017    Zoster 9/26/2017     Past Surgical History:   Procedure Laterality Date    ABDOMEN SURGERY PROC UNLISTED  1985    perforated ulcer    COLONOSCOPY N/A 8/8/2018    COL[facility]al [facility] ENDOSCOPY    COLONOSCOPY,DIAGNOSTIC  3/17/2015         COLONOSCOPY,DIAGNOSTIC  8/8/2018         HX COLONOSCOPY      HX ENDOSCOPY      HX GYN  1984    hysterectomy    HX GYN  1980's    laparoscopy    HX TONSILLECTOMY      UPPER GI ENDOSCOPY,BIOPSY  8/8/2018          Social History     Socioeconomic History    Marital status:      Spouse name: Not on file    Number of children: Not on file    Years of education: Not on file    Highest education level: Not on file   Tobacco Use    Smoking status: Never Smoker    Smokeless tobacco: Never Used   Substance and Sexual Activity    Alcohol use: Yes     Comment: rarely    Drug use: No     Family History   Problem Relation Age of Onset    Cancer Mother         uterine    Stroke Maternal Grandfather      Current Outpatient Medications   Medication Sig Dispense Refill    predniSONE (DELTASONE) 10 mg tablet Take 6 tablets day 1, then 4 tablets for 3 days, then 2 tablets for 3 days, then 1 tablet daily for 7 days 31 Tab 0    cefUROXime (CEFTIN) 500 mg tablet Take 1 Tab by mouth two (2) times a day for 10 days. 20 Tab 0    mometasone (ASMANEX TWISTHALER) 220 mcg/ actuation (30) inhaler Take 1 Puff by inhalation daily.  30 Cap 0    ALPRAZolam (XANAX) 0.25 mg tablet Take 1 Tab by mouth three (3) times daily as needed for Anxiety. Max Daily Amount: 0.75 mg. 30 Tab 0    albuterol (PROVENTIL HFA, VENTOLIN HFA, PROAIR HFA) 90 mcg/actuation inhaler Take 2 Puffs by inhalation every six (6) hours as needed for Wheezing. 1 Inhaler 0    B.infantis-B.ani-B.long-B.bifi (PROBIOTIC 4X) 10-15 mg TbEC Take  by mouth.  ergocalciferol (ERGOCALCIFEROL) 50,000 unit capsule Take 1 Cap by mouth every seven (7) days. 12 Cap 0    multivit,iron,minerals/lutein (CENTRUM SILVER ULTRA WOMEN'S PO) Take  by mouth.  ascorbic acid, vitamin C, (VITAMIN C) 500 mg tablet Take  by mouth.  Biotin 2,500 mcg cap Take 5,000 mg by mouth daily.  omeprazole (PRILOSEC) 20 mg capsule TAKE 1 CAPSULE EVERY DAY 90 Cap 3    simvastatin (ZOCOR) 20 mg tablet TAKE 1 TABLET AT BEDTIME 90 Tab 3    dicyclomine (BENTYL) 10 mg capsule Take 1 Cap by mouth four (4) times daily. (Patient taking differently: Take 10 mg by mouth four (4) times daily as needed.) 360 Cap 0    melatonin tab tablet Take 5 mg by mouth nightly.  aspirin 81 mg tablet Take 81 mg by mouth.  cranberry 500 mg Cap Take 500 mg by mouth daily.  CALCIUM CARBONATE/VITAMIN D3 (CALTRATE WITH VITAMIN D3 PO) Take 2 Tabs by mouth daily. Allergies   Allergen Reactions    Augmentin [Amoxicillin-Pot Clavulanate] Unknown (comments)     \"hard on my stomach\"    Tetracycline Unknown (comments)     \"funny feeling in my mouth. ..years ago\"       Objective:  Visit Vitals  /86 (BP 1 Location: Left arm, BP Patient Position: Sitting)   Pulse 71   Temp 98.4 °F (36.9 °C) (Oral)   Resp 16   Ht 5' 5\" (1.651 m)   Wt 165 lb 8 oz (75.1 kg)   SpO2 98%   BMI 27.54 kg/m²     Physical Exam:   General appearance - alert, well appearing, and in no distress  Mental status - alert, oriented to person, place, and time  EYE-JENNIFER, EOMI, fundi normal, corneas normal, no foreign bodies  ENT-ENT exam normal, no neck nodes or sinus tenderness  Nose - normal and patent, no erythema, discharge or polyps  Mouth - mucous membranes moist, pharynx normal without lesions  Neck - supple, no significant adenopathy   Chest -diffuse expiratory rhonchi throughout with increased expiratory phase, resonant to percussion, no rales noted  Heart - normal rate, regular rhythm, normal S1, S2, no murmurs, rubs, clicks or gallops   Abdomen - soft, nontender, nondistended, no masses or organomegaly  Lymph- no adenopathy palpable  Ext-peripheral pulses normal, no pedal edema, no clubbing or cyanosis  Skin-Warm and dry. no hyperpigmentation, vitiligo, or suspicious lesions  Neuro -alert, oriented, normal speech, no focal findings or movement disorder noted  Musculoskeletal- FROM, no bony abnormalities, no point tenderness    No results found for this visit on 09/27/19. All results for lab orders may not have been returned by the time this encountered was closed. Assessment/Plan:       ICD-10-CM ICD-9-CM    1. Moderate persistent asthmatic bronchitis with acute exacerbation J45.41 493.92 predniSONE (DELTASONE) 10 mg tablet      cefUROXime (CEFTIN) 500 mg tablet      mometasone (ASMANEX TWISTHALER) 220 mcg/ actuation (30) inhaler      ALPRAZolam (XANAX) 0.25 mg tablet       Orders Placed This Encounter    predniSONE (DELTASONE) 10 mg tablet     Sig: Take 6 tablets day 1, then 4 tablets for 3 days, then 2 tablets for 3 days, then 1 tablet daily for 7 days     Dispense:  31 Tab     Refill:  0    cefUROXime (CEFTIN) 500 mg tablet     Sig: Take 1 Tab by mouth two (2) times a day for 10 days. Dispense:  20 Tab     Refill:  0    mometasone (ASMANEX TWISTHALER) 220 mcg/ actuation (30) inhaler     Sig: Take 1 Puff by inhalation daily. Dispense:  30 Cap     Refill:  0    ALPRAZolam (XANAX) 0.25 mg tablet     Sig: Take 1 Tab by mouth three (3) times daily as needed for Anxiety. Max Daily Amount: 0.75 mg.      Dispense:  30 Tab     Refill:  0       Follow-up and Dispositions    · Return in about 1 week (around 10/4/2019). Plan:    Peak flow was 275×2. Start prednisone taper and inhaled steroid. Start Ceftin 500 mg twice daily for 10 days. Alprazolam as needed for anxiety related to shortness of breath and cough. Follow-up in 1 week for reevaluation. I have reviewed with the patient details of the assessment and plan and all questions were answered. Relevent patient education was performed. Verbal and/or written instructions (see AVS) provided. The most recent lab findings were reviewed with the patient. Plan was discussed with patient who verbal expressed understanding. An After Visit Summary was printed and given to the patient.       Santana Simeon MD

## 2019-10-04 ENCOUNTER — OFFICE VISIT (OUTPATIENT)
Dept: INTERNAL MEDICINE CLINIC | Age: 74
End: 2019-10-04

## 2019-10-04 VITALS
WEIGHT: 171.5 LBS | HEART RATE: 59 BPM | OXYGEN SATURATION: 97 % | SYSTOLIC BLOOD PRESSURE: 134 MMHG | RESPIRATION RATE: 24 BRPM | TEMPERATURE: 98.4 F | BODY MASS INDEX: 28.57 KG/M2 | DIASTOLIC BLOOD PRESSURE: 77 MMHG | HEIGHT: 65 IN

## 2019-10-04 DIAGNOSIS — J18.9 PNEUMONIA OF RIGHT LOWER LOBE DUE TO INFECTIOUS ORGANISM: Primary | ICD-10-CM

## 2019-10-04 NOTE — PROGRESS NOTES
This note will not be viewable in [address] Marybeth Paula is a 75 y.o. female and presents with Follow-up  . Subjective:  Mrs. Ayla Oden presents today for follow-up of reactive airway disease as a result of pneumonia. She denies any fever chills or rigors. She has no pleuritic chest pain. She started using her Asmanex inhaler she remains on a prednisone taper. She completed 2 rounds of antibiotics. If she notes that her breathing is somewhat better but she woke up at about 2 AM with a cough and has not gone back to sleep. Review of Systems  Constitutional:   Eyes:   negative for visual disturbance and irritation  ENT:   negative for tinnitus,sore throat,nasal congestion,ear pains. hoarseness  Respiratory:  negative for hemoptysis, dyspnea,wheezing  CV:   negative for chest pain, palpitations, lower extremity edema  GI:   negative for nausea, vomiting, diarrhea, abdominal pain,melena  Endo:               negative for polyuria,polydipsia,polyphagia,heat intolerance  Genitourinary: negative for frequency, dysuria and hematuria  Integumentary: negative for rash and pruritus  Hematologic:  negative for easy bruising and gum/nose bleeding  Musculoskel: negative for myalgias, arthralgias, back pain, muscle weakness, joint pain  Neurological:  negative for headaches, dizziness, vertigo, memory problems and gait   Behavl/Psych: negative for feelings of anxiety, depression, mood changes    Past Medical History:   Diagnosis Date    Arthritis     hands    Cancer ([facility] [address]     basal cell of face, squamous, and melanoma    Colitis 04/21/2018    [facility]     Colitis, ischemic [facility]) 9/26/2017    Duodenal ulcer, perforated ([facility] [address] 9/26/2017    Dyspepsia and other specified disorders of function of stomach     GERD (gastroesophageal reflux disease)     History of rectal bleeding 04/2018    Two episodes; first one 19 years ago - labeled as a \"possible ischemic attack\"    Hypercholesterolemia     Hyperlipidemia 9/26/2017    Hypertension     patient denies/no meds    IBS (irritable bowel syndrome) 9/26/2017    IGT (impaired glucose tolerance) 09/26/2017    Borderline Hgb A1C    Insomnia 9/26/2017    Melanoma ([facility] [address] 9/26/2017    On statin therapy 9/26/2017    Osteopenia 9/26/2017    Pneumonia of right lung due to infectious organism 9/6/2019    PUD (peptic ulcer disease)     Temporal arteritis ([facility] [address] 9/26/2017    Vitamin D deficiency 9/26/2017    Zoster 9/26/2017     Past Surgical History:   Procedure Laterality Date    ABDOMEN SURGERY PROC UNLISTED  1985    perforated ulcer    COLONOSCOPY N/A 8/8/2018    [facility]    COLONOSCOPY,DIAGNOSTIC  3/17/2015         COLONOSCOPY,DIAGNOSTIC  8/8/2018         HX COLONOSCOPY      HX ENDOSCOPY      HX GYN  1984    hysterectomy    HX GYN  1980's    laparoscopy    HX TONSILLECTOMY      UPPER GI ENDOSCOPY,BIOPSY  8/8/2018          Social History     Socioeconomic History    Marital status:      Spouse name: Not on file    Number of children: Not on file    Years of education: Not on file    Highest education level: Not on file   Tobacco Use    Smoking status: Never Smoker    Smokeless tobacco: Never Used   Substance and Sexual Activity    Alcohol use: Yes     Comment: rarely    Drug use: No     Family History   Problem Relation Age of Onset    Cancer Mother         uterine    Stroke Maternal Grandfather      Current Outpatient Medications   Medication Sig Dispense Refill    predniSONE (DELTASONE) 10 mg tablet Take 6 tablets day 1, then 4 tablets for 3 days, then 2 tablets for 3 days, then 1 tablet daily for 7 days 31 Tab 0    cefUROXime (CEFTIN) 500 mg tablet Take 1 Tab by mouth two (2) times a day for 10 days. 20 Tab 0    mometasone (ASMANEX TWISTHALER) 220 mcg/ actuation (30) inhaler Take 1 Puff by inhalation daily.  30 Cap 0    albuterol (PROVENTIL HFA, VENTOLIN HFA, PROAIR HFA) 90 mcg/actuation inhaler Take 2 Puffs by inhalation every six (6) hours as needed for Wheezing. 1 Inhaler 0    B.infantis-B.ani-B.long-B.bifi (PROBIOTIC 4X) 10-15 mg TbEC Take  by mouth.  ergocalciferol (ERGOCALCIFEROL) 50,000 unit capsule Take 1 Cap by mouth every seven (7) days. 12 Cap 0    multivit,iron,minerals/lutein (CENTRUM SILVER ULTRA WOMEN'S PO) Take  by mouth.  ascorbic acid, vitamin C, (VITAMIN C) 500 mg tablet Take  by mouth.  Biotin 2,500 mcg cap Take 5,000 mg by mouth daily.  omeprazole (PRILOSEC) 20 mg capsule TAKE 1 CAPSULE EVERY DAY 90 Cap 3    simvastatin (ZOCOR) 20 mg tablet TAKE 1 TABLET AT BEDTIME 90 Tab 3    melatonin tab tablet Take 5 mg by mouth nightly.  CALCIUM CARBONATE/VITAMIN D3 (CALTRATE WITH VITAMIN D3 PO) Take 2 Tabs by mouth daily.  aspirin 81 mg tablet Take 81 mg by mouth.  cranberry 500 mg Cap Take 500 mg by mouth daily.  ALPRAZolam (XANAX) 0.25 mg tablet Take 1 Tab by mouth three (3) times daily as needed for Anxiety. Max Daily Amount: 0.75 mg. 30 Tab 0    salmeterol (SEREVENT) 50 mcg/dose dsdv Take 1 Puff by inhalation two (2) times a day. 60 Blister 0    dicyclomine (BENTYL) 10 mg capsule Take 1 Cap by mouth four (4) times daily. (Patient taking differently: Take 10 mg by mouth four (4) times daily as needed.) 360 Cap 0     Allergies   Allergen Reactions    Augmentin [Amoxicillin-Pot Clavulanate] Unknown (comments)     \"hard on my stomach\"    Tetracycline Unknown (comments)     \"funny feeling in my mouth. ..years ago\"       Objective:  Visit Vitals  /77 (BP 1 Location: Left arm, BP Patient Position: Sitting)   Pulse (!) 59   Temp 98.4 °F (36.9 °C) (Oral)   Resp 24   Ht 5' 5\" (1.651 m)   Wt 171 lb 8 oz (77.8 kg)   SpO2 97%   BMI 28.54 kg/m²     Physical Exam:   General appearance - alert, well appearing, and in no distress  Mental status - alert, oriented to person, place, and time  EYE-JENNIFER, EOMI, fundi normal, corneas normal, no foreign bodies  ENT-ENT exam normal, no neck nodes or sinus tenderness  Nose - normal and patent, no erythema, discharge or polyps  Mouth - mucous membranes moist, pharynx normal without lesions  Neck - supple, no significant adenopathy   Chest -clear to auscultation, few end expiratory rhonchi significantly improved  Heart - normal rate, regular rhythm, normal S1, S2, no murmurs, rubs, clicks or gallops   Abdomen - soft, nontender, nondistended, no masses or organomegaly  Lymph- no adenopathy palpable  Ext-peripheral pulses normal, no pedal edema, no clubbing or cyanosis  Skin-Warm and dry. no hyperpigmentation, vitiligo, or suspicious lesions  Neuro -alert, oriented, normal speech, no focal findings or movement disorder noted  Musculoskeletal- FROM, no bony abnormalities, no point tenderness    No results found for this visit on 10/04/19. All results for lab orders may not have been returned by the time this encountered was closed. Assessment/Plan:       ICD-10-CM ICD-9-CM    1. Pneumonia of right lower lobe due to infectious organism ([facility] [address] J18.1 486        No orders of the defined types were placed in this encounter. Plan:    Follow-up chest x-ray revealed resolution of the right lower lobe pneumonia. Patient had reactive airway disease with persistent coughing and wheezing. She has completed 2 rounds of antibiotics and is now completing a prolonged steroid taper. She remains on inhaled Asmanex as prescribed. She is using her pro-air inhaler on a regular basis. She may continue the [employer] as needed and use Mucinex for symptomatic relief. Her peak flow today was 350 and 375 whereas it was 250 prior. She has significantly improved and will continue her prednisone taper and finish her Asmanex as prescribed.   If she has any recurring symptoms or her symptoms are not completely resolved by the time she finishes this course of medication she will follow-up in the office for evaluation. Follow-up and Dispositions    · Return for as scheduled. I have reviewed with the patient details of the assessment and plan and all questions were answered. Relevent patient education was performed. Verbal and/or written instructions (see AVS) provided. The most recent lab findings were reviewed with the patient. Plan was discussed with patient who verbal expressed understanding. An After Visit Summary was printed and given to the patient.       Ajit Santos MD

## 2019-10-04 NOTE — PROGRESS NOTES
Reviewed record in preparation for visit and have obtained necessary documentation. Identified pt with two pt identifiers(name and ). Chief Complaint   Patient presents with    Follow-up        Coordination of Care Questionnaire:  :     1) Have you been to an emergency room, urgent care clinic since your last visit? No     Hospitalized since your last visit? No               2) Have you seen or consulted any other health care providers outside of [address] [place] [address] since your last visit?  No

## 2019-10-14 ENCOUNTER — APPOINTMENT (OUTPATIENT)
Dept: INTERNAL MEDICINE CLINIC | Age: 74
End: 2019-10-14

## 2019-10-14 DIAGNOSIS — E55.9 VITAMIN D DEFICIENCY: ICD-10-CM

## 2019-10-15 LAB — 25(OH)D3 SERPL-MCNC: 27 NG/ML (ref 30–96)

## 2019-10-17 DIAGNOSIS — E55.9 VITAMIN D DEFICIENCY: Primary | ICD-10-CM

## 2019-10-17 RX ORDER — ERGOCALCIFEROL 1.25 MG/1
50000 CAPSULE ORAL
Qty: 12 CAP | Refills: 0 | Status: SHIPPED | OUTPATIENT
Start: 2019-10-17 | End: 2019-10-31 | Stop reason: SDUPTHER

## 2019-10-17 NOTE — PROGRESS NOTES
Patient informed of results. She will be in [place] for the winter in 12 weeks and will have her Vitamin D leveled checked by her doctor there and have the results sent to Dr Damion Gonzalez.

## 2019-10-17 NOTE — PROGRESS NOTES
Your vitamin D level has improved but remains below normal range. Continue ergocalciferol 50,000 units weekly for an additional 12 weeks. Repeat a vitamin D level at that time.

## 2019-10-21 ENCOUNTER — TELEPHONE (OUTPATIENT)
Dept: INTERNAL MEDICINE CLINIC | Age: 74
End: 2019-10-21

## 2019-10-21 RX ORDER — ALBUTEROL SULFATE 90 UG/1
2 AEROSOL, METERED RESPIRATORY (INHALATION)
Qty: 1 INHALER | Refills: 1 | Status: SHIPPED | OUTPATIENT
Start: 2019-10-21 | End: 2021-04-21 | Stop reason: ALTCHOICE

## 2019-10-21 RX ORDER — LEVOFLOXACIN 500 MG/1
500 TABLET, FILM COATED ORAL DAILY
Qty: 10 TAB | Refills: 0 | Status: SHIPPED | OUTPATIENT
Start: 2019-10-21 | End: 2019-10-31 | Stop reason: ALTCHOICE

## 2019-10-21 NOTE — TELEPHONE ENCOUNTER
Patient has ten more days of her inhaler and she is still coughing of bright yellow mucus. She is taking Mucinex once a day. She was wheezing and coughing a lot yesterday until she got up the mucus. Please advise.

## 2019-10-21 NOTE — TELEPHONE ENCOUNTER
Patient informed. She will start Levaquin and continue probiotic.   She is scheduled for follow up with Dr Catherine Bui on 10/31/19 at 11:00AM.

## 2019-10-31 ENCOUNTER — OFFICE VISIT (OUTPATIENT)
Dept: INTERNAL MEDICINE CLINIC | Age: 74
End: 2019-10-31

## 2019-10-31 ENCOUNTER — TELEPHONE (OUTPATIENT)
Dept: INTERNAL MEDICINE CLINIC | Age: 74
End: 2019-10-31

## 2019-10-31 VITALS
HEART RATE: 75 BPM | SYSTOLIC BLOOD PRESSURE: 148 MMHG | DIASTOLIC BLOOD PRESSURE: 84 MMHG | HEIGHT: 65 IN | BODY MASS INDEX: 27.66 KG/M2 | OXYGEN SATURATION: 97 % | RESPIRATION RATE: 20 BRPM | WEIGHT: 166 LBS | TEMPERATURE: 98.4 F

## 2019-10-31 DIAGNOSIS — J45.20 MILD INTERMITTENT ASTHMATIC BRONCHITIS WITHOUT COMPLICATION: Primary | ICD-10-CM

## 2019-10-31 DIAGNOSIS — J45.41 MODERATE PERSISTENT ASTHMATIC BRONCHITIS WITH ACUTE EXACERBATION: ICD-10-CM

## 2019-10-31 NOTE — TELEPHONE ENCOUNTER
Patient wanted to know if trazodone would be the best thing for her to take to help her sleep and if so she would need a prescription called in to [facility] on [address]

## 2019-10-31 NOTE — PROGRESS NOTES
Reviewed record in preparation for visit and have obtained necessary documentation. Identified pt with two pt identifiers(name and ). Chief Complaint   Patient presents with    Follow-up    Cough        Coordination of Care Questionnaire:  :     1) Have you been to an emergency room, urgent care clinic since your last visit? No     Hospitalized since your last visit? No               2) Have you seen or consulted any other health care providers outside of [address] [place] [address] since your last visit?  No

## 2019-10-31 NOTE — PROGRESS NOTES
This note will not be viewable in [address] Concepcion Perez is a 75 y.o. female and presents with Follow-up and Cough  . Subjective: Emily Jacob presents today for follow-up of a cough. She called because of increased sputum production and was placed on Levaquin. She has had a long-standing cough now for 2 months. She has been on multiple rounds of antibiotics as well as inhaled steroids and a steroid Dosepak. Her symptoms are significantly better overall. She has very little wheezing at this time. She is no longer using a rescue inhaler. She would like to see a pulmonologist for further evaluation. She is going to [place] at the end of November and may not be able to be seen in time before she leaves. She can always follow-up with a pulmonologist in [place] when she arrives there. She has been using Asmanex on a regular basis. This is most likely helped. She is not sure as far as insurance coverage and we may consider alternatives. Review of Systems  Constitutional:   Eyes:   negative for visual disturbance and irritation  ENT:   negative for tinnitus,sore throat,nasal congestion,ear pains. hoarseness  Respiratory:  negative for  hemoptysis, dyspnea,wheezing  CV:   negative for chest pain, palpitations, lower extremity edema  GI:   negative for nausea, vomiting, diarrhea, abdominal pain,melena  Endo:               negative for polyuria,polydipsia,polyphagia,heat intolerance  Genitourinary: negative for frequency, dysuria and hematuria  Integumentary: negative for rash and pruritus  Hematologic:  negative for easy bruising and gum/nose bleeding  Musculoskel: negative for myalgias, arthralgias, back pain, muscle weakness, joint pain  Neurological:  negative for headaches, dizziness, vertigo, memory problems and gait   Behavl/Psych: negative for feelings of anxiety, depression, mood changes    Past Medical History:   Diagnosis Date    Arthritis     hands    Cancer ([facility] [address]     basal cell of face, squamous, and melanoma    Col[facility]/21/2018    [facility]     Colitis, ischemic [facility]) 9/26/2017    Duodenal ulcer, perforated ([facility] [address] 9/26/2017    Dyspepsia and other specified disorders of function of stomach     GERD (gastroesophageal reflux disease)     History of rectal bleeding 04/2018    Two episodes; first one 19 years ago - labeled as a \"possible ischemic attack\"    Hypercholesterolemia     Hyperlipidemia 9/26/2017    Hypertension     patient denies/no meds    IBS (irritable bowel syndrome) 9/26/2017    IGT (impaired glucose tolerance) 09/26/2017    Borderline Hgb A1C    Insomnia 9/26/2017    Melanoma ([facility] [address] 9/26/2017    On statin therapy 9/26/2017    Osteopenia 9/26/2017    Pneumonia of right lung due to infectious organism 9/6/2019    PUD (peptic ulcer disease)     Temporal arteritis ([facility] [address] 9/26/2017    Vitamin D deficiency 9/26/2017    Zoster 9/26/2017     Past Surgical History:   Procedure Laterality Date    ABDOMEN SURGERY PROC UNLISTED  1985    perforated ulcer    COLONOSCOPY N/A 8/8/2018    [facility] ENDOSCOPY    COLONOSCOPY,DIAGNOSTIC  3/17/2015         COLONOSCOPY,DIAGNOSTIC  8/8/2018         HX COLONOSCOPY      HX ENDOSCOPY      HX GYN  1984    hysterectomy    HX GYN  1980's    laparoscopy    HX TONSILLECTOMY      UPPER GI ENDOSCOPY,BIOPSY  8/8/2018          Social History     Socioeconomic History    Marital status:      Spouse name: Not on file    Number of children: Not on file    Years of education: Not on file    Highest education level: Not on file   Tobacco Use    Smoking status: Never Smoker    Smokeless tobacco: Never Used   Substance and Sexual Activity    Alcohol use: Yes     Comment: rarely    Drug use: No     Family History   Problem Relation Age of Onset    Cancer Mother         uterine    Stroke Maternal Grandfather      Current Outpatient Medications   Medication Sig Dispense Refill    mometasone (ASMANEX TWISTHALER) 220 mcg/ actuation (30) inhaler Take 1 Puff by inhalation daily. 30 Cap 3    albuterol (PROVENTIL HFA, VENTOLIN HFA, PROAIR HFA) 90 mcg/actuation inhaler Take 2 Puffs by inhalation every six (6) hours as needed for Wheezing. 1 Inhaler 1    ALPRAZolam (XANAX) 0.25 mg tablet Take 1 Tab by mouth three (3) times daily as needed for Anxiety. Max Daily Amount: 0.75 mg. 30 Tab 0    B.infantis-B.ani-B.long-B.bifi (PROBIOTIC 4X) 10-15 mg TbEC Take  by mouth.  ergocalciferol (ERGOCALCIFEROL) 50,000 unit capsule Take 1 Cap by mouth every seven (7) days. 12 Cap 0    multivit,iron,minerals/lutein (CENTRUM SILVER ULTRA WOMEN'S PO) Take  by mouth.  ascorbic acid, vitamin C, (VITAMIN C) 500 mg tablet Take  by mouth.  Biotin 2,500 mcg cap Take 5,000 mg by mouth daily.  omeprazole (PRILOSEC) 20 mg capsule TAKE 1 CAPSULE EVERY DAY 90 Cap 3    simvastatin (ZOCOR) 20 mg tablet TAKE 1 TABLET AT BEDTIME 90 Tab 3    melatonin tab tablet Take 5 mg by mouth nightly as needed.  aspirin 81 mg tablet Take 81 mg by mouth.  cranberry 500 mg Cap Take 500 mg by mouth daily.  salmeterol (SEREVENT) 50 mcg/dose dsdv Take 1 Puff by inhalation two (2) times a day. 60 Blister 0    dicyclomine (BENTYL) 10 mg capsule Take 1 Cap by mouth four (4) times daily. (Patient taking differently: Take 10 mg by mouth four (4) times daily as needed.) 360 Cap 0    CALCIUM CARBONATE/VITAMIN D3 (CALTRATE WITH VITAMIN D3 PO) Take 2 Tabs by mouth daily. Allergies   Allergen Reactions    Augmentin [Amoxicillin-Pot Clavulanate] Unknown (comments)     \"hard on my stomach\"    Tetracycline Unknown (comments)     \"funny feeling in my mouth. ..years ago\"       Objective:  Visit Vitals  /84 (BP 1 Location: Left arm, BP Patient Position: Sitting)   Pulse 75   Temp 98.4 °F (36.9 °C) (Oral)   Resp 20   Ht 5' 5\" (1.651 m)   Wt 166 lb (75.3 kg)   SpO2 97%   BMI 27.62 kg/m²     Physical Exam:   General appearance - alert, well appearing, and in no distress  Mental status - alert, oriented to person, place, and time  EYE-JENNIFER, EOMI, fundi normal, corneas normal, no foreign bodies  ENT-ENT exam normal, no neck nodes or sinus tenderness  Nose - normal and patent, no erythema, discharge or polyps  Mouth - mucous membranes moist, pharynx normal without lesions  Neck - supple, no significant adenopathy   Chest - clear to auscultation, no rales, few end expiratory rhonchi in the upper airway, symmetric air entry   Heart - normal rate, regular rhythm, normal S1, S2, no murmurs, rubs, clicks or gallops   Abdomen - soft, nontender, nondistended, no masses or organomegaly  Lymph- no adenopathy palpable  Ext-peripheral pulses normal, no pedal edema, no clubbing or cyanosis  Skin-Warm and dry. no hyperpigmentation, vitiligo, or suspicious lesions  Neuro -alert, oriented, normal speech, no focal findings or movement disorder noted  Musculoskeletal- FROM, no bony abnormalities, no point tenderness    No results found for this visit on 10/31/19. All results for lab orders may not have been returned by the time this encountered was closed. Assessment/Plan:       ICD-10-CM ICD-9-CM    1. Mild intermittent asthmatic bronchitis without complication K44.97 934.92 REFERRAL TO PULMONARY DISEASE   2. Moderate persistent asthmatic bronchitis with acute exacerbation J45.41 493.92 mometasone (ASMANEX TWISTHALER) 220 mcg/ actuation (30) inhaler       Orders Placed This Encounter    REFERRAL TO PULMONARY DISEASE     Referral Priority:   Routine     Referral Type:   Consultation     Referral Reason:   Specialty Services Required     Referred to Provider:   Je Courtney MD     Requested Specialty:   Pulmonary Disease     Number of Visits Requested:   1    mometasone (ASMANEX TWISTHALER) 220 mcg/ actuation (30) inhaler     Sig: Take 1 Puff by inhalation daily. Dispense:  30 Cap     Refill:  3     Plan:     The patient's peak flows are 450, 300, and 320 today. This is a significant improvement. Continue Asmanex. If insurance will not cover consider Symbicort or Breo. Continue Ventolin as needed. Refer to pulmonary for further evaluation. I have reviewed with the patient details of the assessment and plan and all questions were answered. Relevent patient education was performed. Verbal and/or written instructions (see AVS) provided. The most recent lab findings were reviewed with the patient. Plan was discussed with patient who verbal expressed understanding. An After Visit Summary was printed and given to the patient.       Nadir Guardado MD

## 2019-11-01 DIAGNOSIS — F51.01 PRIMARY INSOMNIA: Primary | ICD-10-CM

## 2019-11-01 DIAGNOSIS — J45.41 MODERATE PERSISTENT ASTHMATIC BRONCHITIS WITH ACUTE EXACERBATION: ICD-10-CM

## 2019-11-01 RX ORDER — TRAZODONE HYDROCHLORIDE 50 MG/1
50 TABLET ORAL
Qty: 30 TAB | Refills: 3 | Status: SHIPPED | OUTPATIENT
Start: 2019-11-01 | End: 2020-11-08

## 2019-11-01 RX ORDER — MOMETASONE FUROATE 220 UG/1
INHALANT RESPIRATORY (INHALATION)
Qty: 1 INHALER | Refills: 4 | Status: SHIPPED | OUTPATIENT
Start: 2019-11-01 | End: 2019-11-01 | Stop reason: SDUPTHER

## 2019-11-01 NOTE — TELEPHONE ENCOUNTER
Gertrude Escobar MD  You 1 hour ago (8:18 AM)      Yes, a prescription was sent to pharmacy.       Patient notified

## 2019-11-01 NOTE — TELEPHONE ENCOUNTER
Requested Prescriptions     Pending Prescriptions Disp Refills    mometasone (ASMANEX TWISTHALER) 220 mcg/ actuation (30) inhaler 1 Inhaler 4       Last Refill:  Next Appointment:barbie lopez last seen 10/31/19

## 2019-11-11 ENCOUNTER — HOSPITAL ENCOUNTER (OUTPATIENT)
Dept: ULTRASOUND IMAGING | Age: 74
Discharge: HOME | End: 2019-11-11
Attending: INTERNAL MEDICINE
Payer: MEDICARE

## 2019-11-11 ENCOUNTER — HOSPITAL ENCOUNTER (OUTPATIENT)
Dept: GENERAL RADIOLOGY | Age: 74
Discharge: HOME | End: 2019-11-11
Attending: INTERNAL MEDICINE
Payer: MEDICARE

## 2019-11-11 DIAGNOSIS — M25.50 JOINT PAIN: ICD-10-CM

## 2019-11-11 DIAGNOSIS — R05.9 COUGH: ICD-10-CM

## 2019-11-11 DIAGNOSIS — M25.60 STIFFNESS IN JOINT: ICD-10-CM

## 2019-11-11 DIAGNOSIS — M25.40 JOINT SWELLING: ICD-10-CM

## 2019-11-11 PROCEDURE — 93970 EXTREMITY STUDY: CPT

## 2019-11-11 PROCEDURE — 71046 X-RAY EXAM CHEST 2 VIEWS: CPT

## 2019-11-20 ENCOUNTER — HOSPITAL ENCOUNTER (OUTPATIENT)
Dept: GENERAL RADIOLOGY | Age: 74
Discharge: HOME | End: 2019-11-20
Attending: INTERNAL MEDICINE
Payer: MEDICARE

## 2019-11-20 DIAGNOSIS — R05.9 COUGH: ICD-10-CM

## 2019-11-20 PROCEDURE — 74220 X-RAY XM ESOPHAGUS 1CNTRST: CPT

## 2019-12-20 ENCOUNTER — TELEPHONE (OUTPATIENT)
Dept: INTERNAL MEDICINE CLINIC | Age: 74
End: 2019-12-20

## 2019-12-24 RX ORDER — TIZANIDINE 4 MG/1
4 TABLET ORAL
Qty: 30 TAB | Refills: 0 | Status: SHIPPED | OUTPATIENT
Start: 2019-12-24 | End: 2021-01-11 | Stop reason: SDUPTHER

## 2020-01-09 ENCOUNTER — TELEPHONE (OUTPATIENT)
Dept: INTERNAL MEDICINE CLINIC | Age: 75
End: 2020-01-09

## 2020-01-09 NOTE — TELEPHONE ENCOUNTER
Dr. Myranda Rivera gave her an injection about three years ago in her back for Sacroiliitis (see thinks). She wanted to know what type of injection it was and when it was given also proper name.

## 2020-01-13 NOTE — TELEPHONE ENCOUNTER
Yesenia Acuna MD      Depomedrol and lidocaine. Verified two patient identifiers, name and date of birth. Patient informed of dr Edna Carey note above.

## 2020-02-05 ENCOUNTER — TELEPHONE (OUTPATIENT)
Dept: INTERNAL MEDICINE CLINIC | Age: 75
End: 2020-02-05

## 2020-02-05 NOTE — TELEPHONE ENCOUNTER
Received lab results via fax from patient's doctor in [place]. After reviewing the lab results,  [facility] advises patient to take Vitamin D3 2,000 IU OTC daily.

## 2020-03-18 RX ORDER — SIMVASTATIN 20 MG/1
TABLET, FILM COATED ORAL
Qty: 90 TAB | Refills: 3 | Status: SHIPPED | OUTPATIENT
Start: 2020-03-18 | End: 2021-04-14 | Stop reason: SDUPTHER

## 2020-03-18 RX ORDER — OMEPRAZOLE 20 MG/1
CAPSULE, DELAYED RELEASE ORAL
Qty: 90 CAP | Refills: 3 | Status: SHIPPED | OUTPATIENT
Start: 2020-03-18 | End: 2021-04-14 | Stop reason: SDUPTHER

## 2020-03-18 NOTE — TELEPHONE ENCOUNTER
Requested Prescriptions     Pending Prescriptions Disp Refills    simvastatin (ZOCOR) 20 mg tablet 90 Tab 3     Sig: TAKE 1 TABLET AT BEDTIME    omeprazole (PRILOSEC) 20 mg capsule 90 Cap 3       Last Refill: 4/19/19  Next Appointment:barbie lopez last seen 10/31/19

## 2020-06-26 ENCOUNTER — OFFICE VISIT (OUTPATIENT)
Dept: INTERNAL MEDICINE CLINIC | Age: 75
End: 2020-06-26

## 2020-06-26 VITALS
HEIGHT: 65 IN | RESPIRATION RATE: 18 BRPM | WEIGHT: 156.6 LBS | SYSTOLIC BLOOD PRESSURE: 118 MMHG | OXYGEN SATURATION: 98 % | TEMPERATURE: 98.2 F | BODY MASS INDEX: 26.09 KG/M2 | HEART RATE: 63 BPM | DIASTOLIC BLOOD PRESSURE: 80 MMHG

## 2020-06-26 DIAGNOSIS — L03.113 CELLULITIS OF RIGHT UPPER EXTREMITY: Primary | ICD-10-CM

## 2020-06-26 RX ORDER — AZITHROMYCIN 250 MG/1
TABLET, FILM COATED ORAL
Qty: 6 TAB | Refills: 0 | Status: SHIPPED | OUTPATIENT
Start: 2020-06-26 | End: 2020-07-01

## 2020-06-26 RX ORDER — CEFUROXIME AXETIL 500 MG/1
TABLET ORAL
COMMUNITY
Start: 2020-04-30 | End: 2021-04-21 | Stop reason: ALTCHOICE

## 2020-06-26 RX ORDER — HYDROXYZINE 25 MG/1
TABLET, FILM COATED ORAL
COMMUNITY
Start: 2020-05-06 | End: 2021-04-21

## 2020-06-26 NOTE — PROGRESS NOTES
This note will not be viewable in [address] Tamiko Dasilva is a 67 y.o. female and presents with Insect Bite  . Subjective:    Mrs. Karine Muñoz presents today with complaint of an insect bite on her right forearm. She has had significant swelling and redness at the site. She denies any pain or discomfort. She did note some itching initially. The redness has improved since yesterday. She denies fever or chills. Review of Systems  Constitutional:   Eyes:   negative for visual disturbance and irritation  ENT:   negative for tinnitus,sore throat,nasal congestion,ear pains. hoarseness  Respiratory:  negative for cough, hemoptysis, dyspnea,wheezing  CV:   negative for chest pain, palpitations, lower extremity edema  GI:   negative for nausea, vomiting, diarrhea, abdominal pain,melena  Endo:               negative for polyuria,polydipsia,polyphagia,heat intolerance  Genitourinary: negative for frequency, dysuria and hematuria  Integumentary: negative for rash and pruritus  Hematologic:  negative for easy bruising and gum/nose bleeding  Musculoskel: negative for myalgias, arthralgias, back pain, muscle weakness, joint pain  Neurological:  negative for headaches, dizziness, vertigo, memory problems and gait   Behavl/Psych: negative for feelings of anxiety, depression, mood changes    Past Medical History:   Diagnosis Date    Arthritis     hands    Cancer ([facility] [address]     basal cell of face, squamous, and melanoma    [facility] 04/21/2018    [facility]     Colitis, ischemic ([facility] [address] 9/26/2017    Duodenal ulcer, perforated ([facility] [address] 9/26/2017    Dyspepsia and other specified disorders of function of stomach     GERD (gastroesophageal reflux disease)     History of rectal bleeding 04/2018    Two episodes; first one 19 years ago - labeled as a \"possible ischemic attack\"    Hypercholesterolemia     Hyperlipidemia 9/26/2017    Hypertension     patient denies/no meds    IBS (irritable bowel syndrome) 9/26/2017    IGT (impaired glucose tolerance) 09/26/2017    Borderline Hgb A1C    Insomnia 9/26/2017    Melanoma ([facility] [address] 9/26/2017    On statin therapy 9/26/2017    Osteopenia 9/26/2017    Pneumonia of right lung due to infectious organism 9/6/2019    PUD (peptic ulcer disease)     Temporal arteritis ([facility] [address] 9/26/2017    Vitamin D deficiency 9/26/2017    Zoster 9/26/2017     Past Surgical History:   Procedure Laterality Date    ABDOMEN SURGERY PROC UNLISTED  1985    perforated ulcer    COLONOSCOPY N/A 8/8/2018    COLONOSCOPY performed by [facility] Thurman MD at [facility] ENDOSCOPY    COLONOSCOPY,DIAGNOSTIC  3/17/2015         COLONOSCOPY,DIAGNOSTIC  8/8/2018         HX COLONOSCOPY      HX ENDOSCOPY      HX GYN  1984    hysterectomy    HX GYN  1980's    laparoscopy    HX TONSILLECTOMY      UPPER GI ENDOSCOPY,BIOPSY  8/8/2018          Social History     Socioeconomic History    Marital status:      Spouse name: Not on file    Number of children: Not on file    Years of education: Not on file    Highest education level: Not on file   Tobacco Use    Smoking status: Never Smoker    Smokeless tobacco: Never Used   Substance and Sexual Activity    Alcohol use: Yes     Comment: rarely    Drug use: No     Family History   Problem Relation Age of Onset    Cancer Mother         uterine    Stroke Maternal Grandfather      Current Outpatient Medications   Medication Sig Dispense Refill    cefUROXime (CEFTIN) 500 mg tablet       hydrOXYzine HCL (ATARAX) 25 mg tablet TAKE 1 TABLET BY MOUTH EVERYDAY AT BEDTIME      azithromycin (ZITHROMAX) 250 mg tablet Take 2 tablets initially, then one daily 6 Tab 0    simvastatin (ZOCOR) 20 mg tablet TAKE 1 TABLET AT BEDTIME 90 Tab 3    omeprazole (PRILOSEC) 20 mg capsule TAKE 1 CAPSULE EVERY DAY 90 Cap 3    tiZANidine (ZANAFLEX) 4 mg tablet Take 1 Tab by mouth three (3) times daily as needed for Pain.  30 Tab 0    B.infantis-B.ani-B.long-B.bifi (PROBIOTIC 4X) 10-15 mg TbEC Take  by mouth.  multivit,iron,minerals/lutein (CENTRUM SILVER ULTRA WOMEN'S PO) Take  by mouth.  ascorbic acid, vitamin C, (VITAMIN C) 500 mg tablet Take  by mouth.  Biotin 2,500 mcg cap Take 5,000 mg by mouth daily.  melatonin tab tablet Take 5 mg by mouth nightly as needed.  aspirin 81 mg tablet Take 81 mg by mouth.  cranberry 500 mg Cap Take 500 mg by mouth daily.  traZODone (DESYREL) 50 mg tablet Take 1 Tab by mouth nightly. 30 Tab 3    mometasone (ASMANEX TWISTHALER) 220 mcg/ actuation (30) inhaler INHALE 1 PUFF BY MOUTH ONCE DAILY 3 Inhaler 3    mometasone (ASMANEX TWISTHALER) 220 mcg/ actuation (30) inhaler Take 1 Puff by inhalation daily. 30 Cap 3    albuterol (PROVENTIL HFA, VENTOLIN HFA, PROAIR HFA) 90 mcg/actuation inhaler Take 2 Puffs by inhalation every six (6) hours as needed for Wheezing. 1 Inhaler 1    ALPRAZolam (XANAX) 0.25 mg tablet Take 1 Tab by mouth three (3) times daily as needed for Anxiety. Max Daily Amount: 0.75 mg. 30 Tab 0    salmeterol (SEREVENT) 50 mcg/dose dsdv Take 1 Puff by inhalation two (2) times a day. 60 Blister 0    ergocalciferol (ERGOCALCIFEROL) 50,000 unit capsule Take 1 Cap by mouth every seven (7) days. 12 Cap 0    dicyclomine (BENTYL) 10 mg capsule Take 1 Cap by mouth four (4) times daily. (Patient taking differently: Take 10 mg by mouth four (4) times daily as needed.) 360 Cap 0    CALCIUM CARBONATE/VITAMIN D3 (CALTRATE WITH VITAMIN D3 PO) Take 2 Tabs by mouth daily. Allergies   Allergen Reactions    Augmentin [Amoxicillin-Pot Clavulanate] Unknown (comments)     \"hard on my stomach\"    Tetracycline Unknown (comments)     \"funny feeling in my mouth. ..years ago\"       Objective:  Visit Vitals  /80 (BP 1 Location: Left arm, BP Patient Position: Sitting)   Pulse 63   Temp 98.2 °F (36.8 °C) (Oral)   Resp 18   Ht 5' 5\" (1.651 m)   Wt 156 lb 9.6 oz (71 kg)   SpO2 98%   BMI 26.06 kg/m²     Physical Exam:   General appearance - alert, well appearing, and in no distress  Mental status - alert, oriented to person, place, and time  EYE-JENNIFER, EOMI, fundi normal, corneas normal, no foreign bodies  ENT-ENT exam normal, no neck nodes or sinus tenderness  Nose - normal and patent, no erythema, discharge or polyps  Mouth - mucous membranes moist, pharynx normal without lesions  Neck - supple, no significant adenopathy   Chest - clear to auscultation, no wheezes, rales or rhonchi, symmetric air entry   Heart - normal rate, regular rhythm, normal S1, S2, no murmurs, rubs, clicks or gallops   Abdomen - soft, nontender, nondistended, no masses or organomegaly  Lymph- no adenopathy palpable  Ext-peripheral pulses normal, no pedal edema, no clubbing or cyanosis  Skin-mild erythema and swelling of the entire right forearm around a central area where a insect bite has occurred with erythema and induration   neuro -alert, oriented, normal speech, no focal findings or movement disorder noted  Musculoskeletal- FROM, no bony abnormalities, no point tenderness    No results found for this visit on 06/26/20. All results for lab orders may not have been returned by the time this encountered was closed. Assessment/Plan:       ICD-10-CM ICD-9-CM    1. Cellulitis of right upper extremity L03.113 682.3 azithromycin (ZITHROMAX) 250 mg tablet       Plan:    Insect bite with soft tissue infection. Complete course of a azithromycin. If symptoms do not subside or progress return to clinic for evaluation. I have reviewed with the patient details of the assessment and plan and all questions were answered. Relevent patient education was performed. Verbal and/or written instructions (see AVS) provided. The most recent lab findings were reviewed with the patient. Plan was discussed with patient who verbal expressed understanding. An After Visit Summary was printed and given to the patient.       Sesar De La Cruz MD

## 2020-08-07 ENCOUNTER — TELEPHONE (OUTPATIENT)
Dept: INTERNAL MEDICINE CLINIC | Age: 75
End: 2020-08-07

## 2020-10-14 ENCOUNTER — OFFICE VISIT (OUTPATIENT)
Dept: INTERNAL MEDICINE CLINIC | Age: 75
End: 2020-10-14
Payer: MEDICARE

## 2020-10-14 VITALS
OXYGEN SATURATION: 97 % | BODY MASS INDEX: 25.46 KG/M2 | RESPIRATION RATE: 16 BRPM | TEMPERATURE: 97.3 F | WEIGHT: 152.8 LBS | DIASTOLIC BLOOD PRESSURE: 72 MMHG | HEIGHT: 65 IN | SYSTOLIC BLOOD PRESSURE: 138 MMHG | HEART RATE: 73 BPM

## 2020-10-14 DIAGNOSIS — E55.9 VITAMIN D DEFICIENCY: ICD-10-CM

## 2020-10-14 DIAGNOSIS — K58.9 IRRITABLE BOWEL SYNDROME, UNSPECIFIED TYPE: ICD-10-CM

## 2020-10-14 DIAGNOSIS — Z13.31 SCREENING FOR DEPRESSION: ICD-10-CM

## 2020-10-14 DIAGNOSIS — Z23 NEEDS FLU SHOT: ICD-10-CM

## 2020-10-14 DIAGNOSIS — Z79.899 ON STATIN THERAPY: ICD-10-CM

## 2020-10-14 DIAGNOSIS — N39.0 URINARY TRACT INFECTION WITHOUT HEMATURIA, SITE UNSPECIFIED: ICD-10-CM

## 2020-10-14 DIAGNOSIS — R73.02 IGT (IMPAIRED GLUCOSE TOLERANCE): ICD-10-CM

## 2020-10-14 DIAGNOSIS — R53.83 FATIGUE, UNSPECIFIED TYPE: ICD-10-CM

## 2020-10-14 DIAGNOSIS — M31.6 TEMPORAL ARTERITIS (HCC): ICD-10-CM

## 2020-10-14 DIAGNOSIS — Z78.0 POSTMENOPAUSAL STATE: ICD-10-CM

## 2020-10-14 DIAGNOSIS — E78.2 MIXED HYPERLIPIDEMIA: ICD-10-CM

## 2020-10-14 DIAGNOSIS — M85.80 OSTEOPENIA, UNSPECIFIED LOCATION: ICD-10-CM

## 2020-10-14 DIAGNOSIS — Z00.00 MEDICARE ANNUAL WELLNESS VISIT, SUBSEQUENT: Primary | ICD-10-CM

## 2020-10-14 LAB
A-G RATIO,AGRAT: 1.7 RATIO
ALBUMIN SERPL-MCNC: 4.6 G/DL (ref 3.9–5.4)
ALP SERPL-CCNC: 128 U/L (ref 38–126)
ALT SERPL-CCNC: 25 U/L (ref 0–35)
ANION GAP SERPL CALC-SCNC: 10 MMOL/L
AST SERPL W P-5'-P-CCNC: 29 U/L (ref 14–36)
BACTERIA,BACTU: ABNORMAL
BILIRUB SERPL-MCNC: 0.6 MG/DL (ref 0.2–1.3)
BILIRUB UR QL: NEGATIVE
BUN SERPL-MCNC: 21 MG/DL (ref 7–17)
BUN/CREATININE RATIO,BUCR: 30 RATIO
CALCIUM SERPL-MCNC: 9.2 MG/DL (ref 8.4–10.2)
CHLORIDE SERPL-SCNC: 101 MMOL/L (ref 98–107)
CHOL/HDL RATIO,CHHD: 2 RATIO (ref 0–4)
CHOLEST SERPL-MCNC: 189 MG/DL (ref 0–200)
CLARITY: CLEAR
CO2 SERPL-SCNC: 30 MMOL/L (ref 22–32)
COLOR UR: ABNORMAL
CREAT SERPL-MCNC: 0.7 MG/DL (ref 0.7–1.2)
ERYTHROCYTE [DISTWIDTH] IN BLOOD BY AUTOMATED COUNT: 13.2 %
GLOBULIN,GLOB: 2.7
GLUCOSE 24H UR-MRATE: NEGATIVE G/(24.H)
GLUCOSE SERPL-MCNC: 102 MG/DL (ref 65–105)
HBA1C MFR BLD HPLC: 5.5 % (ref 4–5.7)
HCT VFR BLD AUTO: 44.3 % (ref 37–51)
HDLC SERPL-MCNC: 89 MG/DL (ref 35–130)
HGB BLD-MCNC: 14.4 G/DL (ref 12–18)
HGB UR QL STRIP: NEGATIVE
KETONES UR QL STRIP.AUTO: NEGATIVE
LDL/HDL RATIO,LDHD: 1 RATIO
LDLC SERPL CALC-MCNC: 80 MG/DL (ref 0–130)
LEUKOCYTE ESTERASE: ABNORMAL
LYMPHOCYTES ABSOLUTE: 2.4 K/UL (ref 0.6–4.1)
LYMPHOCYTES NFR BLD: 31.9 % (ref 10–58.5)
MCH RBC QN AUTO: 31.3 PG (ref 26–32)
MCHC RBC AUTO-ENTMCNC: 32.5 G/DL (ref 30–36)
MCV RBC AUTO: 96.3 FL (ref 80–97)
MONOCYTES ABS-DIF,2141: 0.5 K/UL (ref 0–1.8)
MONOCYTES NFR BLD: 6.4 % (ref 0.1–24)
NEUTROPHILS # BLD: 61.7 % (ref 37–92)
NEUTROPHILS ABS,2156: 4.7 K/UL (ref 2–7.8)
NITRITE UR QL STRIP.AUTO: NEGATIVE
PH UR STRIP: 5 [PH] (ref 5–7)
PLATELET # BLD AUTO: 245 K/UL (ref 140–440)
POTASSIUM SERPL-SCNC: 5 MMOL/L (ref 3.6–5)
PROT SERPL-MCNC: 7.3 G/DL (ref 6.3–8.2)
PROT UR STRIP-MCNC: NEGATIVE MG/DL
RBC # BLD AUTO: 4.6 M/UL (ref 4.2–6.3)
RBC #/AREA URNS HPF: 0 #/HPF
SED RATE (ESR): 4 MM/HR (ref 0–20)
SODIUM SERPL-SCNC: 141 MMOL/L (ref 137–145)
SP GR UR REFRACTOMETRY: 1.01 (ref 1–1.03)
TRIGL SERPL-MCNC: 102 MG/DL (ref 0–200)
UROBILINOGEN UR QL STRIP.AUTO: NEGATIVE
VLDLC SERPL CALC-MCNC: 20 MG/DL
WBC # BLD AUTO: 7.6 K/UL (ref 4.1–10.9)
WBC URNS QL MICRO: ABNORMAL #/HPF

## 2020-10-14 PROCEDURE — 80061 LIPID PANEL: CPT | Performed by: INTERNAL MEDICINE

## 2020-10-14 PROCEDURE — G8399 PT W/DXA RESULTS DOCUMENT: HCPCS | Performed by: INTERNAL MEDICINE

## 2020-10-14 PROCEDURE — G0008 ADMIN INFLUENZA VIRUS VAC: HCPCS | Performed by: INTERNAL MEDICINE

## 2020-10-14 PROCEDURE — 3288F FALL RISK ASSESSMENT DOCD: CPT | Performed by: INTERNAL MEDICINE

## 2020-10-14 PROCEDURE — 99214 OFFICE O/P EST MOD 30 MIN: CPT | Performed by: INTERNAL MEDICINE

## 2020-10-14 PROCEDURE — G8427 DOCREV CUR MEDS BY ELIG CLIN: HCPCS | Performed by: INTERNAL MEDICINE

## 2020-10-14 PROCEDURE — G8510 SCR DEP NEG, NO PLAN REQD: HCPCS | Performed by: INTERNAL MEDICINE

## 2020-10-14 PROCEDURE — G0439 PPPS, SUBSEQ VISIT: HCPCS | Performed by: INTERNAL MEDICINE

## 2020-10-14 PROCEDURE — G8419 CALC BMI OUT NRM PARAM NOF/U: HCPCS | Performed by: INTERNAL MEDICINE

## 2020-10-14 PROCEDURE — 81003 URINALYSIS AUTO W/O SCOPE: CPT | Performed by: INTERNAL MEDICINE

## 2020-10-14 PROCEDURE — 1090F PRES/ABSN URINE INCON ASSESS: CPT | Performed by: INTERNAL MEDICINE

## 2020-10-14 PROCEDURE — 90694 VACC AIIV4 NO PRSRV 0.5ML IM: CPT

## 2020-10-14 PROCEDURE — 85651 RBC SED RATE NONAUTOMATED: CPT | Performed by: INTERNAL MEDICINE

## 2020-10-14 PROCEDURE — 80053 COMPREHEN METABOLIC PANEL: CPT | Performed by: INTERNAL MEDICINE

## 2020-10-14 PROCEDURE — 82306 VITAMIN D 25 HYDROXY: CPT | Performed by: INTERNAL MEDICINE

## 2020-10-14 PROCEDURE — G8536 NO DOC ELDER MAL SCRN: HCPCS | Performed by: INTERNAL MEDICINE

## 2020-10-14 PROCEDURE — 1100F PTFALLS ASSESS-DOCD GE2>/YR: CPT | Performed by: INTERNAL MEDICINE

## 2020-10-14 PROCEDURE — 85025 COMPLETE CBC W/AUTO DIFF WBC: CPT | Performed by: INTERNAL MEDICINE

## 2020-10-14 PROCEDURE — 83036 HEMOGLOBIN GLYCOSYLATED A1C: CPT | Performed by: INTERNAL MEDICINE

## 2020-10-14 RX ORDER — TRIAMCINOLONE ACETONIDE 1 MG/G
CREAM TOPICAL
COMMUNITY
Start: 2020-10-06 | End: 2021-04-21 | Stop reason: ALTCHOICE

## 2020-10-14 NOTE — PROGRESS NOTES
This is the Subsequent Medicare Annual Wellness Exam, performed 12 months or more after the Initial AWV or the last Subsequent AWV    I have reviewed the patient's medical history in detail and updated the computerized patient record.      History     Patient Active Problem List   Diagnosis Code    Abdominal pain R10.9    Gallstones K80.20    Hyperlipidemia E78.5    Osteopenia M85.80    Zoster B02.9    Melanoma ([address] C43.9    Temporal arteritis ([address] M31.6    On statin therapy Z79.899    Vitamin D deficiency E55.9    IGT (impaired glucose tolerance) R73.02    IBS (irritable bowel syndrome) K58.9    Duodenal ulcer, perforated ([address] K26.5    Colitis, ischemic ([address] K55.9    Insomnia G47.00    Back pain M54.9    Pneumonia of right lung due to infectious organism J18.9     Past Medical History:   Diagnosis Date    Arthritis     hands    Cancer ([address]     basal cell of face, squamous, and melanoma    [facility]21[facility]2018    [facility]     Colitis, ischemic ([address] 9/26/2017    Duodenal ulcer, perforated ([address] 9/26/2017    Dyspepsia and other specified disorders of function of stomach     GERD (gastroesophageal reflux disease)     History of rectal bleeding 04/2018    Two episodes; first one 19 years ago - labeled as a \"possible ischemic attack\"    Hypercholesterolemia     Hyperlipidemia 9/26/2017    Hypertension     patient denies/no meds    IBS (irritable bowel syndrome) 9/26/2017    IGT (impaired glucose tolerance) 09/26/2017    Borderline Hgb A1C    Insomnia 9/26/2017    Melanoma ([address] 9/26/2017    On statin therapy 9/26/2017    Osteopenia 9/26/2017    Pneumonia of right lung due to infectious organism 9/6/2019    PUD (peptic ulcer disease)     Temporal arteritis ([address] 9/26/2017    Vitamin D deficiency 9/26/2017    Zoster 9/26/2017      Past Surgical History:   Procedure Laterality Date    ABDOMEN SURGERY [address]    perforated ulcer    COLONOSCOPY N/A 8/8/2018 [facility]COPY performed [facility]COPY    COLONOSCOPY,DIAGNOSTIC  3/17/2015         COLONOSCOPY,DIAGNOSTIC  8/8/2018         HX COLONOSCOPY      HX ENDOSCOPY      HX GYN  1984    hysterectomy    HX GYN  [id number]    laparoscopy    HX TONSILLECTOMY      UPPER GI ENDOSCOPY,BIOPSY  8/8/2018          Current Outpatient Medications   Medication Sig Dispense Refill    triamcinolone acetonide (KENALOG) 0.1 % topical cream       hydrOXYzine HCL (ATARAX) 25 mg tablet TAKE 1 TABLET BY MOUTH EVERYDAY AT BEDTIME      simvastatin (ZOCOR) 20 mg tablet TAKE 1 TABLET AT BEDTIME 90 Tab 3    omeprazole (PRILOSEC) 20 mg capsule TAKE 1 CAPSULE EVERY DAY 90 Cap 3    tiZANidine (ZANAFLEX) 4 mg tablet Take 1 Tab by mouth three (3) times daily as needed for Pain. 30 Tab 0    B.infantis-B.ani-B.long-B.bifi (PROBIOTIC 4X) 10-15 mg TbEC Take  by mouth.  multivit,iron,minerals/lutein (CENTRUM SILVER ULTRA WOMEN'S PO) Take  by mouth.  ascorbic acid, vitamin C, (VITAMIN C) 500 mg tablet Take  by mouth.  Biotin 2,500 mcg cap Take 5,000 mg by mouth daily.  dicyclomine (BENTYL) 10 mg capsule Take 1 Cap by mouth four (4) times daily. (Patient taking differently: Take 10 mg by mouth four (4) times daily as needed.) 360 Cap 0    melatonin tab tablet Take 5 mg by mouth nightly as needed.  aspirin 81 mg tablet Take 81 mg by mouth.  cranberry 500 mg Cap Take 500 mg by mouth daily.  cefUROXime (CEFTIN) 500 mg tablet       traZODone (DESYREL) 50 mg tablet Take 1 Tab by mouth nightly. 30 Tab 3    mometasone (ASMANEX TWISTHALER) 220 mcg/ actuation (30) inhaler INHALE 1 PUFF BY MOUTH ONCE DAILY 3 Inhaler 3    mometasone (ASMANEX TWISTHALER) 220 mcg/ actuation (30) inhaler Take 1 Puff by inhalation daily. 30 Cap 3    albuterol (PROVENTIL HFA, VENTOLIN HFA, PROAIR HFA) 90 mcg/actuation inhaler Take 2 Puffs by inhalation every six (6) hours as needed for Wheezing.  1 Inhaler 1    ALPRAZolam (XANAX) 0.25 mg tablet Take 1 Tab by mouth three (3) times daily as needed for Anxiety. Max Daily Amount: 0.75 mg. 30 Tab 0    salmeterol (SEREVENT) 50 mcg/dose dsdv Take 1 Puff by inhalation two (2) times a day. 60 Blister 0    ergocalciferol (ERGOCALCIFEROL) 50,000 unit capsule Take 1 Cap by mouth every seven (7) days. 12 Cap 0    CALCIUM CARBONATE/VITAMIN D3 (CALTRATE WITH VITAMIN D3 PO) Take 2 Tabs by mouth daily. Allergies   Allergen Reactions    Augmentin [Amoxicillin-Pot Clavulanate] Unknown (comments)     \"hard on my stomach\"    Tetracycline Unknown (comments)     \"funny feeling in my mouth. ..years ago\"       Family History   Problem Relation Age of Onset    Cancer Mother         uterine    Stroke Maternal Grandfather      Social History     Tobacco Use    Smoking status: Never Smoker    Smokeless tobacco: Never Used   Substance Use Topics    Alcohol use: Yes     Comment: rarely       Depression Risk Factor Screening:     3 most recent PHQ Screens 10/14/2020   Little interest or pleasure in doing things Not at all   Feeling down, depressed, irritable, or hopeless Not at all   Total Score PHQ 2 0       Alcohol Risk Screen   Do you average more than 1 drink per night or more than 7 drinks a week:  No    On any one occasion in the past three months have you have had more than 3 drinks containing alcohol:  No        Functional Ability and Level of Safety:   Hearing: Hearing is good. Activities of Daily Living: The home contains: no safety equipment. Patient does total self care     Ambulation: with no difficulty     Fall Risk:  Fall Risk Assessment, last 12 mths 10/14/2020   Able to walk? Yes   Fall in past 12 months? Yes   Fall with injury?  No   Number of falls in past 12 months 1   Fall Risk Score 1     Abuse Screen:  Patient is not abused       Cognitive Screening   Has your family/caregiver stated any concerns about your memory: no     Cognitive Screening: Normal - Verbal Fluency Test    Patient Care Team   Patient Care Team:  Franklyn Natarajan MD as PCP - General (Internal Medicine)  Franklyn Natarajan MD as PCP - [facility]  Ofelia Carpenter MD as Physician (Cardiology)    Assessment/Plan   Education and counseling provided:  Are appropriate based on today's review and evaluation    Diagnoses and all orders for this visit:    1. Medicare annual wellness visit, subsequent    2. Temporal arteritis (HCC)  -     SED RATE (ESR)    3. IGT (impaired glucose tolerance)  -     METABOLIC PANEL, COMPREHENSIVE  -     HEMOGLOBIN A1C W/O EAG  -     URINALYSIS W/O MICRO    4. Osteopenia, unspecified location    5. Mixed hyperlipidemia  -     LIPID PANEL  -     METABOLIC PANEL, COMPREHENSIVE    6. On statin therapy    7. Vitamin D deficiency  -     VITAMIN D, 25 HYDROXY    8. Fatigue, unspecified type  -     CBC WITH AUTOMATED DIFF    9. Irritable bowel syndrome, unspecified type    10. Needs flu shot  -     FLU (FLUAD QUAD INFLUENZA VACCINE,QUAD,ADJUVANTED)    11. Screening for depression  -     DEPRESSION SCREEN ANNUAL    12. Postmenopausal state  -     DEXA BONE DENSITY STUDY AXIAL; Future        Health Maintenance Due   Topic Date Due    GLAUCOMA SCREENING Q2Y  05/26/2009    Medicare Yearly Exam  07/11/2020    Flu Vaccine (1) 09/01/2020           Lynette Ochoa is a 67 y.o. female and presents with Annual Wellness Visit  . Subjective:    Mrs. Michael Ford presents today for Medicare annual wellness review as well as follow-up of several problems including hyperlipidemia, monitoring statin therapy, history of peptic ulcer disease, vitamin D deficiency, osteopenia, history of temporal arteritis, impaired glucose tolerance, and remote history of melanoma. She has been doing very well generally. She is walking up to 11 miles a day. She remains very active. She and her  go to [place] for the winter. She has had no problems with her medications.   She has no shortness of breath, chest pain, palpitations, PND, orthopnea, or pedal edema. Past Medical History:   Diagnosis Date    Arthritis     hands    Cancer ([facility]     basal cell of face, squamous, and melanoma    Colitis 04/21/2018    [facility]     Colitis[facility] ischemic [facility]) 9/26/2017    Du[facility] ulcer[facility] perforated ([facility] 9/26/2017    Dyspepsia and other specified disorders of function of stomach     GERD (gastroesophageal reflux disease)     History of rectal bleeding 04/2018    Two episodes; first one 19 years ago - labeled as a \"possible ischemic attack\"    Hypercholesterolemia     Hyperlipidemia 9/26/2017    Hypertension     patient denies/no meds    IBS (irritable bowel syndrome) 9/26/2017    IGT (impaired glucose tolerance) 09/26/2017    Borderline Hgb A1C    Insomnia 9/26/2017    Melanoma ([facility] 9/26/2017    On statin therapy 9/26/2017    Osteopenia 9/26/2017    Pneumonia of right lung due to infectious organism 9/6/2019    PUD (peptic ulcer disease)     Temporal arteritis ([facility] 9/26/2017    Vitamin D deficiency 9/26/2017    Zoster 9/26/2017     Past Surgical History:   Procedure Laterality Date    ABDOMEN SURGERY PROC UNLISTED  1985    perforated ulcer    COLONOSCOPY N/A 8/8/2018    COLONOSCOPY performed [facility]ariah Toribio MD at hospitals ENDOSCOPY    COLONOSCOPY,DIAGNOSTIC  3/17/2015         COLONOSCOPY,DIAGNOSTIC  8/8/2018         HX COLONOSCOPY      HX ENDOSCOPY      HX GYN  1984    hysterectomy    HX GYN  [id number]    laparoscopy    HX TONSILLECTOMY      UPPER GI ENDOSCOPY,BIOPSY  8/8/2018          Allergies   Allergen Reactions    Augmentin [Amoxicillin-Pot Clavulanate] Unknown (comments)     \"hard on my stomach\"    Tetracycline Unknown (comments)     \"funny feeling in my mouth. ..years ago\"     Current Outpatient Medications   Medication Sig Dispense Refill    triamcinolone acetonide (KENALOG) 0.1 % topical cream       hydrOXYzine HCL (ATARAX) 25 mg tablet TAKE 1 TABLET BY MOUTH EVERYDAY AT BEDTIME      simvastatin (ZOCOR) 20 mg tablet TAKE 1 TABLET AT BEDTIME 90 Tab 3    omeprazole (PRILOSEC) 20 mg capsule TAKE 1 CAPSULE EVERY DAY 90 Cap 3    tiZANidine (ZANAFLEX) 4 mg tablet Take 1 Tab by mouth three (3) times daily as needed for Pain. 30 Tab 0    B.infantis-B.ani-B.long-B.bifi (PROBIOTIC 4X) 10-15 mg TbEC Take  by mouth.  multivit,iron,minerals/lutein (CENTRUM SILVER ULTRA WOMEN'S PO) Take  by mouth.  ascorbic acid, vitamin C, (VITAMIN C) 500 mg tablet Take  by mouth.  Biotin 2,500 mcg cap Take 5,000 mg by mouth daily.  dicyclomine (BENTYL) 10 mg capsule Take 1 Cap by mouth four (4) times daily. (Patient taking differently: Take 10 mg by mouth four (4) times daily as needed.) 360 Cap 0    melatonin tab tablet Take 5 mg by mouth nightly as needed.  aspirin 81 mg tablet Take 81 mg by mouth.  cranberry 500 mg Cap Take 500 mg by mouth daily.  cefUROXime (CEFTIN) 500 mg tablet       traZODone (DESYREL) 50 mg tablet Take 1 Tab by mouth nightly. 30 Tab 3    mometasone (ASMANEX TWISTHALER) 220 mcg/ actuation (30) inhaler INHALE 1 PUFF BY MOUTH ONCE DAILY 3 Inhaler 3    mometasone (ASMANEX TWISTHALER) 220 mcg/ actuation (30) inhaler Take 1 Puff by inhalation daily. 30 Cap 3    albuterol (PROVENTIL HFA, VENTOLIN HFA, PROAIR HFA) 90 mcg/actuation inhaler Take 2 Puffs by inhalation every six (6) hours as needed for Wheezing. 1 Inhaler 1    ALPRAZolam (XANAX) 0.25 mg tablet Take 1 Tab by mouth three (3) times daily as needed for Anxiety. Max Daily Amount: 0.75 mg. 30 Tab 0    salmeterol (SEREVENT) 50 mcg/dose dsdv Take 1 Puff by inhalation two (2) times a day. 60 Blister 0    ergocalciferol (ERGOCALCIFEROL) 50,000 unit capsule Take 1 Cap by mouth every seven (7) days. 12 Cap 0    CALCIUM CARBONATE/VITAMIN D3 (CALTRATE WITH VITAMIN D3 PO) Take 2 Tabs by mouth daily.        Social History     Socioeconomic History    Marital status:      Spouse name: Not on file    Number of children: Not on file    Years of education: Not on file    Highest education level: Not on file   Tobacco Use    Smoking status: Never Smoker    Smokeless tobacco: Never Used   Substance and Sexual Activity    Alcohol use: Yes     Comment: rarely    Drug use: No     Family History   Problem Relation Age of Onset    Cancer Mother         uterine    Stroke Maternal Grandfather        Review of Systems  Constitutional:  negative for fevers, chills, anorexia and weight loss  Eyes:    negative for visual disturbance and irritation  ENT:    negative for tinnitus,sore throat,nasal congestion,ear pains. hoarseness  Respiratory:     negative for cough, hemoptysis, dyspnea,wheezing  CV:    negative for chest pain, palpitations, lower extremity edema  GI:    negative for nausea, vomiting, diarrhea, abdominal pain,melena  Endo:               negative for polyuria,polydipsia,polyphagia,heat intolerance  Genitourinary : negative for frequency, dysuria and hematuria  Integumentary: negative for rash and pruritus  Hematologic:   negative for easy bruising and gum/nose bleeding  Musculoskel:  negative for myalgias, arthralgias, back pain, muscle weakness, joint pain  Neurological:   negative for headaches, dizziness, vertigo, memory problems and gait   Behavl/Psych:  negative for feelings of anxiety, depression, mood changes  ROS otherwise negative      Objective:  Visit Vitals  /72 (BP 1 Location: Left arm, BP Patient Position: Sitting)   Pulse 73   Temp 97.3 °F (36.3 °C) (Oral)   Resp 16   Ht 5' 5\" (1.651 m)   Wt 152 lb 12.8 oz (69.3 kg)   SpO2 97%   BMI 25.43 kg/m²     Physical Exam:   General appearance - alert, well appearing, and in no distress  Mental status - alert, oriented to person, place, and time  EYE-JENNIFER, EOMI, fundi normal, corneas normal, no foreign bodies  ENT-ENT exam normal, no neck nodes or sinus tenderness  Nose - normal and patent, no erythema, discharge or polyps  Mouth - mucous membranes moist, pharynx normal without lesions  Neck - supple, no significant adenopathy   Chest - clear to auscultation, no wheezes, rales or rhonchi, symmetric air entry   Heart - normal rate, regular rhythm, normal S1, S2, no murmurs, rubs, clicks or gallops   Abdomen - soft, nontender, nondistended, no masses or organomegaly  Lymph- no adenopathy palpable  Ext-peripheral pulses normal, no pedal edema, no clubbing or cyanosis  Skin-Warm and dry. no hyperpigmentation, vitiligo, or suspicious lesions  Neuro -alert, oriented, normal speech, no focal findings or movement disorder noted      Assessment/Plan:  Diagnoses and all orders for this visit:    1. Medicare annual wellness visit, subsequent    2. Temporal arteritis (HCC)  -     SED RATE (ESR)    3. IGT (impaired glucose tolerance)  -     METABOLIC PANEL, COMPREHENSIVE  -     HEMOGLOBIN A1C W/O EAG  -     URINALYSIS W/O MICRO    4. Osteopenia, unspecified location    5. Mixed hyperlipidemia  -     LIPID PANEL  -     METABOLIC PANEL, COMPREHENSIVE    6. On statin therapy    7. Vitamin D deficiency  -     VITAMIN D, 25 HYDROXY    8. Fatigue, unspecified type  -     CBC WITH AUTOMATED DIFF    9. Irritable bowel syndrome, unspecified type    10. Needs flu shot  -     FLU (FLUAD QUAD INFLUENZA VACCINE,QUAD,ADJUVANTED)    11. Screening for depression  -     DEPRESSION SCREEN ANNUAL    12. Postmenopausal state  -     DEXA BONE DENSITY STUDY AXIAL; Future          ICD-10-CM ICD-9-CM    1. Medicare annual wellness visit, subsequent  Z00.00 V70.0    2. Temporal arteritis (HCC)  M31.6 446.5 SED RATE (ESR)   3. IGT (impaired glucose tolerance)  U85.32 809.34 METABOLIC PANEL, COMPREHENSIVE      HEMOGLOBIN A1C W/O EAG      URINALYSIS W/O MICRO   4. Osteopenia, unspecified location  M85.80 733.90    5. Mixed hyperlipidemia  E78.2 272.2 LIPID PANEL      METABOLIC PANEL, COMPREHENSIVE   6. On statin therapy  Z79.899 V58.69    7.  Vitamin D deficiency  E55.9 268.9 VITAMIN D, 25 HYDROXY   8. Fatigue, unspecified type  R53.83 780.79 CBC WITH AUTOMATED DIFF   9. Irritable bowel syndrome, unspecified type  K58.9 564.1    10. Needs flu shot  Z23 V04.81 FLU (FLUAD QUAD INFLUENZA VACCINE,QUAD,ADJUVANTED)   11. Screening for depression  Z13.31 V79.0 [address]   12. Postmenopausal state  Z78.0 V49.81 DEXA BONE DENSITY STUDY AXIAL     Plan:    Multiple problems as outlined above currently stable. The patient will give a flu vaccine today. She will be due for follow-up bone density test given previous osteopenia noted on bone density 2 years ago. She did have a follow-up mammogram done at [facility] and the results have been forwarded to her gynecologist.  We will try to get a copy of these results for our record here as well. Her last A1c was minimally elevated at 5.9%. She remains active as noted above. We will continue to monitor her blood sugars as well as her cholesterol. I have reviewed with the patient details of the assessment and plan and all questions were answered. Relevent patient education was performed. Verbal and/or written instructions (see AVS) provided. The most recent lab findings were reviewed with the patient. Plan was discussed with patient who verbally expressed understanding. An After Visit Summary was printed and given to the patient.     Jes Valdivia MD

## 2020-10-14 NOTE — PROGRESS NOTES
Chief Complaint   Patient presents with    Annual Wellness Visit       Depression Risk Factor Screening:     3 most recent PHQ Screens 10/14/2020   Little interest or pleasure in doing things Not at all   Feeling down, depressed, irritable, or hopeless Not at all   Total Score PHQ 2 0       Functional Ability and Level of Safety:     Activities of Daily Living  ADL Assessment 10/14/2020   Feeding yourself No Help Needed   Getting from bed to chair No Help Needed   Getting dressed No Help Needed   Bathing or showering No Help Needed   Walk across the room (includes cane/walker) No Help Needed   Using the telphone No Help Needed   Taking your medications No Help Needed   Preparing meals No Help Needed   Managing money (expenses/bills) No Help Needed   Moderately strenuous housework (laundry) No Help Needed   Shopping for personal items (toiletries/medicines) No Help Needed   Shopping for groceries No Help Needed   Driving No Help Needed   Climbing a flight of stairs No Help Needed   Getting to places beyond walking distances No Help Needed       Fall Risk  Fall Risk Assessment, last 12 mths 10/14/2020   Able to walk? Yes   Fall in past 12 months? Yes   Fall with injury? No   Number of falls in past 12 months 1   Fall Risk Score 1       Abuse Screen  Abuse Screening Questionnaire 10/14/2020   Do you ever feel afraid of your partner? N   Are you in a relationship with someone who physically or mentally threatens you? N   Is it safe for you to go home? Y     Reviewed record in preparation for visit and have obtained necessary documentation. Identified pt with two pt identifiers(name and ).     Chief Complaint   Patient presents with   [facility] Annual Wellness Visit      Wt Readings from Last 3 Encounters:   10/14/20 152 lb 12.8 oz (69.3 kg)   20 156 lb 9.6 oz (71 kg)   10/31/19 166 lb (75.3 kg)     Temp Readings from Last 3 Encounters:   10/14/20 97.3 °F (36.3 °C) (Oral)   20 98.2 °F (36.8 °C) (Oral) 10/31/19 98.4 °F (36.9 °C) (Oral)     BP Readings from Last 3 Encounters:   10/14/20 (!) 152/76   06/26/20 118/80   10/31/19 148/84     Pulse Readings from Last 3 Encounters:   10/14/20 73   06/26/20 63   10/31/19 75       Coordination of Care Questionnaire:  :      1) Have you been to an emergency room, urgent care clinic since your last visit? No     Hospitalized since your last visit? No               2) Have you seen or consulted any other health care providers outside of [address] [place] [address] since your last visit? Yes Dr Jules Hermosillo      After obtaining written consent and per orders of Dr. Ricky Cabrera, injection of Fluad in left deltoid given by Brenden Tamayo CMA. Order and injection/medication verified by [facility]. Patient tolerated procedure well. VIS was given to them. No reactions noted.               Patient Care Team   Patient Care Team:  Martita Friend MD as PCP - General (Internal Medicine)  Martita Friend MD as PCP - [address] [place] [address]  Empaneled Provider  Nicolasa Murray MD as Physician (Cardiology)

## 2020-10-14 NOTE — PATIENT INSTRUCTIONS
Vaccine Information Statement Influenza (Flu) Vaccine (Inactivated or Recombinant): What You Need to Know Many Vaccine Information Statements are available in Pashto and other languages. See www.immunize.org/vis Hojas de información sobre vacunas están disponibles en español y en muchos otros idiomas. Visite www.immunize.org/vis 1. Why get vaccinated? Influenza vaccine can prevent influenza (flu). Flu is a contagious disease that spreads around the [facility] every year, usually between October and May. Anyone can get the flu, but it is more dangerous for some people. Infants and young children, people 72years of age and older, pregnant women, and people with certain health conditions or a weakened immune system are at greatest risk of flu complications. Pneumonia, bronchitis, sinus infections and ear infections are examples of flu-related complications. If you have a medical condition, such as heart disease, cancer or diabetes, flu can make it worse. Flu can cause fever and chills, sore throat, muscle aches, fatigue, cough, headache, and runny or stuffy nose. Some people may have vomiting and diarrhea, though this is more common in children than adults. Each year thousands of people in the [facility] die from flu, and many more are hospitalized. Flu vaccine prevents millions of illnesses and flu-related visits to the doctor each year. 2. Influenza vaccines CDC recommends everyone 10months of age and older get vaccinated every flu season. Children 6 months through 6years of age may need 2 doses during a single flu season. Everyone else needs only 1 dose each flu season. It takes about 2 weeks for protection to develop after vaccination. There are many flu viruses, and they are always changing. Each year a new flu vaccine is made to protect against three or four viruses that are likely to cause disease in the upcoming flu season.  Even when the vaccine doesnt exactly match these viruses, it may still provide some protection. Influenza vaccine does not cause flu. Influenza vaccine may be given at the same time as other vaccines. 3. Talk with your health care provider Tell your vaccine provider if the person getting the vaccine: 
 Has had an allergic reaction after a previous dose of influenza vaccine, or has any severe, life-threatening allergies.  Has ever had Guillain-Barré Syndrome (also called GBS). In some cases, your health care provider may decide to postpone influenza vaccination to a future visit. People with minor illnesses, such as a cold, may be vaccinated. People who are moderately or severely ill should usually wait until they recover before getting influenza vaccine. Your health care provider can give you more information. 4. Risks of a reaction  Soreness, redness, and swelling where shot is given, fever, muscle aches, and headache can happen after influenza vaccine.  There may be a very small increased risk of Guillain-Barré Syndrome (GBS) after inactivated influenza vaccine (the flu shot). [facility] children who get the flu shot along with pneumococcal vaccine (PCV13), and/or DTaP vaccine at the same time might be slightly more likely to have a seizure caused by fever. Tell your health care provider if a child who is getting flu vaccine has ever had a seizure. People sometimes faint after medical procedures, including vaccination. Tell your provider if you feel dizzy or have vision changes or ringing in the ears. As with any medicine, there is a very remote chance of a vaccine causing a severe allergic reaction, other serious injury, or death. 5. What if there is a serious problem? An allergic reaction could occur after the vaccinated person leaves the clinic.  If you see signs of a severe allergic reaction (hives, swelling of the face and throat, difficulty breathing, a fast heartbeat, dizziness, or weakness), call 9-1-1 and get the person to the nearest hospital. 
 
For other signs that concern you, call your health care provider. Adverse reactions should be reported to the Vaccine Adverse Event Reporting System (VAERS). Your health care provider will usually file this report, or you can do it yourself. Visit the VAERS website at www.vaers. hhs.gov or call 6-909.890.5921. VAERS is only for reporting reactions, and VAERS staff do not give medical advice. 6. The National Vaccine Injury Compensation Program 
 
The [facility] Vaccine Injury Compensation Program (VICP) is a federal program that was created to compensate people who may have been injured by certain vaccines. Visit the VICP website at www.[facility].gov/vaccinecompensation or call 3-784.163.8040 to learn about the program and about filing a claim. There is a time limit to file a claim for compensation. 7. How can I learn more?  Ask your health care provider.  Call your local or state health department.  Contact the Centers for Disease Control and Prevention (CDC): 
- Call 5-991.141.2757 (6-164-ASL-INFO) or 
- Visit CDCs influenza website at www.cdc.gov/flu Vaccine Information Statement (Interim) Inactivated Influenza Vaccine 8/15/2019 
42 CHANCE Ellis Leblanc 327UO-15 Department of [employer] Centers for Disease Control and Prevention Office Use Only Medicare Wellness Visit, Female The best way to live healthy is to have a lifestyle where you eat a well-balanced diet, exercise regularly, limit alcohol use, and quit all forms of tobacco/nicotine, if applicable. Regular preventive services are another way to keep healthy. Preventive services (vaccines, screening tests, monitoring & exams) can help personalize your care plan, which helps you manage your own care. Screening tests can find health problems at the earliest stages, when they are easiest to treat. [facility] follows the current, evidence-based guidelines published by the [facility] Leroy Murphy ([facility]) when recommending preventive services for our patients. Because we follow these guidelines, sometimes recommendations change over time as research supports it. (For example, mammograms used to be recommended annually. Even though Medicare will still pay for an annual mammogram, the newer guidelines recommend a mammogram every two years for women of average risk). Of course, you and your doctor may decide to screen more often for some diseases, based on your risk and your co-morbidities (chronic disease you are already diagnosed with). Preventive services for you include: - Medicare offers their members a free annual wellness visit, which is time for you and your primary care provider to discuss and plan for your preventive service needs. Take advantage of this benefit every year! 
-All adults over the age of 72 should receive the recommended pneumonia vaccines. Current USPSTF guidelines recommend a series of two vaccines for the best pneumonia protection.  
-All adults should have a flu vaccine yearly and a tetanus vaccine every 10 years.  
-All adults age 48 and older should receive the shingles vaccines (series of two vaccines).      
-All adults age 38-68 who are overweight should have a diabetes screening test once every three years.  
-All adults born between [birth year] and 1966 should be screened once for Hepatitis C. 
-Other screening tests and preventive services for persons with diabetes include: an eye exam to screen for diabetic retinopathy, a kidney function test, a foot exam, and stricter control over your cholesterol.  
-Cardiovascular screening for adults with routine risk involves an electrocardiogram (ECG) at intervals determined by your doctor.  
-Colorectal cancer screenings should be done for adults age 54-65 with no increased risk factors for colorectal cancer. There are a number of acceptable methods of screening for this type of cancer. Each test has its own benefits and drawbacks. Discuss with your doctor what is most appropriate for you during your annual wellness visit. The different tests include: colonoscopy (considered the best screening method), a fecal occult blood test, a fecal DNA test, and sigmoidoscopy. 
 
-A bone mass density test is recommended when a woman turns 65 to screen for osteoporosis. This test is only recommended one time, as a screening. Some providers will use this same test as a disease monitoring tool if you already have osteoporosis. -Breast cancer screenings are recommended every other year for women of normal risk, age 54-69. 
-Cervical cancer screenings for women over age 72 are only recommended with certain risk factors. Here is a list of your current Health Maintenance items (your personalized list of preventive services) with a due date: 
Health Maintenance Due Topic Date Due  Glaucoma Screening   05/26/2009 Lizette Annual Well Visit  07/11/2020  Yearly Flu Vaccine (1) 09/01/2020

## 2020-10-15 LAB — 25(OH)D3 SERPL-MCNC: 27 NG/ML (ref 30–96)

## 2020-10-16 LAB — BACTERIA UR CULT: NO GROWTH

## 2020-11-06 DIAGNOSIS — F51.01 PRIMARY INSOMNIA: ICD-10-CM

## 2020-11-08 RX ORDER — TRAZODONE HYDROCHLORIDE 50 MG/1
TABLET ORAL
Qty: 30 TAB | Refills: 0 | Status: SHIPPED | OUTPATIENT
Start: 2020-11-08 | End: 2021-04-21

## 2020-11-16 ENCOUNTER — TELEPHONE (OUTPATIENT)
Dept: INTERNAL MEDICINE CLINIC | Age: 75
End: 2020-11-16

## 2021-01-11 ENCOUNTER — TELEPHONE (OUTPATIENT)
Dept: INTERNAL MEDICINE CLINIC | Age: 76
End: 2021-01-11

## 2021-01-11 RX ORDER — TIZANIDINE 4 MG/1
4 TABLET ORAL
Qty: 30 TAB | Refills: 0 | Status: SHIPPED | OUTPATIENT
Start: 2021-01-11 | End: 2021-04-21 | Stop reason: SDUPTHER

## 2021-01-11 NOTE — TELEPHONE ENCOUNTER
Patient calling to see if she can get a refill on tizanidine last filled on 12/24/19.   She has been having some lift side cramping at her waist.     Pharmacy:  [facility]

## 2021-04-14 RX ORDER — OMEPRAZOLE 20 MG/1
CAPSULE, DELAYED RELEASE ORAL
Qty: 90 CAP | Refills: 3 | Status: SHIPPED | OUTPATIENT
Start: 2021-04-14 | End: 2022-04-14 | Stop reason: SDUPTHER

## 2021-04-14 RX ORDER — SIMVASTATIN 20 MG/1
TABLET, FILM COATED ORAL
Qty: 90 TAB | Refills: 3 | Status: SHIPPED | OUTPATIENT
Start: 2021-04-14 | End: 2022-04-14 | Stop reason: SDUPTHER

## 2021-04-14 NOTE — TELEPHONE ENCOUNTER
Last Refill: 3/18/20  Last Visit: 10/14/20  Next Visit: 4/21/2021    Requested Prescriptions     Pending Prescriptions Disp Refills    simvastatin (ZOCOR) 20 mg tablet 90 Tab 3     Sig: TAKE 1 TABLET AT BEDTIME    omeprazole (PRILOSEC) 20 mg capsule 90 Cap 3     Sig: TAKE 1 CAPSULE EVERY DAY

## 2021-04-21 ENCOUNTER — OFFICE VISIT (OUTPATIENT)
Dept: INTERNAL MEDICINE CLINIC | Age: 76
End: 2021-04-21

## 2021-04-21 VITALS
WEIGHT: 151 LBS | HEART RATE: 68 BPM | DIASTOLIC BLOOD PRESSURE: 84 MMHG | RESPIRATION RATE: 16 BRPM | SYSTOLIC BLOOD PRESSURE: 132 MMHG | HEIGHT: 65 IN | OXYGEN SATURATION: 98 % | TEMPERATURE: 97.6 F | BODY MASS INDEX: 25.16 KG/M2

## 2021-04-21 DIAGNOSIS — E55.9 VITAMIN D DEFICIENCY: ICD-10-CM

## 2021-04-21 DIAGNOSIS — M85.80 OSTEOPENIA, UNSPECIFIED LOCATION: ICD-10-CM

## 2021-04-21 DIAGNOSIS — I67.9 CEREBROVASCULAR DISEASE: ICD-10-CM

## 2021-04-21 DIAGNOSIS — R53.83 FATIGUE, UNSPECIFIED TYPE: ICD-10-CM

## 2021-04-21 DIAGNOSIS — R35.0 FREQUENCY OF MICTURITION: ICD-10-CM

## 2021-04-21 DIAGNOSIS — K21.9 GASTROESOPHAGEAL REFLUX DISEASE WITHOUT ESOPHAGITIS: Primary | ICD-10-CM

## 2021-04-21 DIAGNOSIS — E78.2 MIXED HYPERLIPIDEMIA: ICD-10-CM

## 2021-04-21 DIAGNOSIS — R73.02 IGT (IMPAIRED GLUCOSE TOLERANCE): ICD-10-CM

## 2021-04-21 DIAGNOSIS — K58.9 IRRITABLE BOWEL SYNDROME, UNSPECIFIED TYPE: ICD-10-CM

## 2021-04-21 DIAGNOSIS — Z79.899 ON STATIN THERAPY: ICD-10-CM

## 2021-04-21 PROBLEM — K55.9 COLITIS, ISCHEMIC (HCC): Status: RESOLVED | Noted: 2017-09-26 | Resolved: 2021-04-21

## 2021-04-21 PROBLEM — M31.6 TEMPORAL ARTERITIS (HCC): Status: RESOLVED | Noted: 2017-09-26 | Resolved: 2021-04-21

## 2021-04-21 LAB
APPEARANCE UR: CLEAR
BACTERIA URNS QL MICRO: NEGATIVE /HPF
BILIRUB UR QL: NEGATIVE
COLOR UR: ABNORMAL
EPITH CASTS URNS QL MICRO: ABNORMAL /LPF
GLUCOSE UR STRIP.AUTO-MCNC: NEGATIVE MG/DL
HGB UR QL STRIP: NEGATIVE
HYALINE CASTS URNS QL MICRO: ABNORMAL /LPF (ref 0–5)
KETONES UR QL STRIP.AUTO: NEGATIVE MG/DL
LEUKOCYTE ESTERASE UR QL STRIP.AUTO: ABNORMAL
NITRITE UR QL STRIP.AUTO: NEGATIVE
PH UR STRIP: 6.5 [PH] (ref 5–8)
PROT UR STRIP-MCNC: NEGATIVE MG/DL
RBC #/AREA URNS HPF: ABNORMAL /HPF (ref 0–5)
SP GR UR REFRACTOMETRY: 1 (ref 1–1.03)
UR CULT HOLD, URHOLD: NORMAL
UROBILINOGEN UR QL STRIP.AUTO: 0.2 EU/DL (ref 0.2–1)
WBC URNS QL MICRO: ABNORMAL /HPF (ref 0–4)

## 2021-04-21 PROCEDURE — 1090F PRES/ABSN URINE INCON ASSESS: CPT | Performed by: INTERNAL MEDICINE

## 2021-04-21 PROCEDURE — 3288F FALL RISK ASSESSMENT DOCD: CPT | Performed by: INTERNAL MEDICINE

## 2021-04-21 PROCEDURE — G8432 DEP SCR NOT DOC, RNG: HCPCS | Performed by: INTERNAL MEDICINE

## 2021-04-21 PROCEDURE — 1100F PTFALLS ASSESS-DOCD GE2>/YR: CPT | Performed by: INTERNAL MEDICINE

## 2021-04-21 PROCEDURE — 99214 OFFICE O/P EST MOD 30 MIN: CPT | Performed by: INTERNAL MEDICINE

## 2021-04-21 PROCEDURE — G8427 DOCREV CUR MEDS BY ELIG CLIN: HCPCS | Performed by: INTERNAL MEDICINE

## 2021-04-21 PROCEDURE — G8419 CALC BMI OUT NRM PARAM NOF/U: HCPCS | Performed by: INTERNAL MEDICINE

## 2021-04-21 PROCEDURE — G8399 PT W/DXA RESULTS DOCUMENT: HCPCS | Performed by: INTERNAL MEDICINE

## 2021-04-21 PROCEDURE — G8536 NO DOC ELDER MAL SCRN: HCPCS | Performed by: INTERNAL MEDICINE

## 2021-04-21 RX ORDER — CEFUROXIME AXETIL 250 MG/1
250 TABLET ORAL
COMMUNITY
Start: 2021-04-14

## 2021-04-21 RX ORDER — ESTRADIOL 0.1 MG/G
CREAM VAGINAL
COMMUNITY
Start: 2021-02-26

## 2021-04-21 RX ORDER — TIZANIDINE 4 MG/1
4 TABLET ORAL
Qty: 30 TAB | Refills: 0 | Status: SHIPPED | OUTPATIENT
Start: 2021-04-21 | End: 2021-06-03 | Stop reason: SDUPTHER

## 2021-04-21 RX ORDER — PYRIDOXINE HCL (VITAMIN B6) 100 MG
100 TABLET ORAL DAILY
COMMUNITY

## 2021-04-21 RX ORDER — CHOLECALCIFEROL TAB 125 MCG (5000 UNIT) 125 MCG
5000 TAB ORAL DAILY
COMMUNITY

## 2021-04-21 RX ORDER — DICYCLOMINE HYDROCHLORIDE 10 MG/1
10 CAPSULE ORAL
Qty: 120 CAP | Refills: 0 | Status: SHIPPED | OUTPATIENT
Start: 2021-04-21

## 2021-04-21 NOTE — PROGRESS NOTES
Jason Fuller presents today at the clinic for    Chief Complaint   Patient presents with    Cholesterol Problem     follow up    GERD     follow up    Anxiety     follow up    Insomnia     follow up        Wt Readings from Last 3 Encounters:   04/21/21 151 lb (68.5 kg)   10/14/20 152 lb 12.8 oz (69.3 kg)   06/26/20 156 lb 9.6 oz (71 kg)     Temp Readings from Last 3 Encounters:   04/21/21 97.6 °F (36.4 °C) (Oral)   10/14/20 97.3 °F (36.3 °C) (Oral)   06/26/20 98.2 °F (36.8 °C) (Oral)     BP Readings from Last 3 Encounters:   04/21/21 (!) 140/88   10/14/20 138/72   06/26/20 118/80     Pulse Readings from Last 3 Encounters:   04/21/21 68   10/14/20 73   06/26/20 63       Health Maintenance Due   Topic    COVID-19 Vaccine (1)         Learning Assessment:  :     Learning Assessment 9/29/2017   PRIMARY LEARNER Patient   PRIMARY LANGUAGE ENGLISH   LEARNER PREFERENCE PRIMARY READING   ANSWERED BY self   RELATIONSHIP SELF       Depression Screening:  :     3 most recent PHQ Screens 10/14/2020   Little interest or pleasure in doing things Not at all   Feeling down, depressed, irritable, or hopeless Not at all   Total Score PHQ 2 0       Fall Risk Assessment:  :     Fall Risk Assessment, last 12 mths 10/14/2020   Able to walk? Yes   Fall in past 12 months? Yes   Number of falls in past 12 months 1   Fall with injury? 0       Abuse Screening:  :     Abuse Screening Questionnaire 10/14/2020 7/11/2019 6/1/2018 9/29/2017   Do you ever feel afraid of your partner? N N N N   Are you in a relationship with someone who physically or mentally threatens you? N N N N   Is it safe for you to go home? Violeta Barrow       Coordination of Care Questionnaire:  :     1. Have you been to the ER, urgent care clinic since your last visit? Hospitalized since your last visit? no    2. Have you seen or consulted any other health care providers outside of the [address] [place] [address] since your last visit?   Include any pap smears or colon screening.  no

## 2021-04-21 NOTE — LETTER
Name:Bre Rowland JDU:[birth year] MR #:[id number] Provider Nicola Mcneil MD  
*[id number]-679* BSMG-491 (5/16) Page 1 of 5 Initial [place] CONTROLLED SUBSTANCE AGREEMENT I may be prescribed medications that are controlled substances as part  of my treatment plan for management of my medical condition(s). The goal of my treatment plan is to maintain and/or improve my health and wellbeing. Because controlled substances have an increased risk of abuse or harm, continual re-evaluation is needed determine if the goals of my treatment plan are being met for my safety and the safety of others. Lorraine Jessica  am entering into this Controlled Substance Agreement with my provider, Hamzah No MD at [address] [place] [address] . I understand that successful treatment requires mutual trust and honesty between me and my provider. I understand that there are state and federal laws and regulations which apply to the medications that my provider may prescribe that must be followed. I understand there are risks and benefits ts of taking the medicines that my provider may prescribe. I understand and agree that following this Agreement is necessary in continuing my provider-patient relationship and success of my treatment plan. As a part of my treatment plan, I agree to the following: COMMUNICATION: 
 
1. I will communicate fully with my provider about my medical condition(s), including the effect on my daily life and how well my medications are helping. I will tell my provider all of the medications that I take for any reason, including medications I receive from another health care provider, and will notify my provider about all issues, problems or concerns, including any side effects, which may be related to my medications. I understand that this information allows my provider to adjust my treatment plan to help manage my medical condition.  I understand that this information will become part of my permanent medical record. 2. I will notify my provider if I have a history of alcohol/drug misuse/addiction or if I have had treatment for alcohol/drug addiction in the past, or if I have a new problem with or concern about alcohol/drug use/addiction, because this increases the likelihood of high risk behaviors and may lead to serious medical conditions. 3. Females Only: I will notify my provider if I am or become pregnant, or if I intend to become pregnant, or if I intend to breastfeed. I understand that communication of these issues with my provider is important, due to possible effects my medication could have on an unborn fetus or breastfeeding child. Name:. Sarah Herr KRT:[birth year] MR #:[id number] Provider Kalia Desai MD  
*[id number]* BSMG-491 (5/16) Page 2 of 5 Initial [facility] MISUSE OF MEDICATIONS / DRUGS: 
 
1. I agree to take all controlled substances as prescribed, and will not misuse or abuse any controlled substances prescribed by my provider. For my safety, I will not increase the amount of medicine I take without first talking with and getting permission from my provider. 2. If I have a medical emergency, another health care provider may prescribe me medication. If I seek emergency treatment, I will notify my provider within seventy-two (72) hours. 3. I understand that my provider may discuss my use and/or possible misuse/abuse of controlled substances and alcohol, as appropriate, with any health care provider involved in my care, pharmacist or legal authority. ILLEGAL DRUGS: 
 
1. I will not use illegal drugs of any kind, including but not limited to marijuana, heroin, cocaine, or any prescription drug which is not prescribed to me. DRUG DIVERSION / PRESCRIPTION FRAUD: 
 
1. I will not share, sell, trade, give away, or otherwise misuse my prescriptions or medications.  
 
2. I will not alter any prescriptions provided to me by my provider. SINGLE PROVIDER: 
 
1. I agree that all controlled substances that I take will be prescribed only by my provider (or his/her covering provider) under this Agreement. This agreement does not prevent me from seeking emergency medical treatment or receiving pain management related to a surgery. PROTECTING MEDICATIONS: 
 
1. I am responsible for keeping my prescriptions and medications in a safe and secure place including safeguarding them from loss or theft. I understand that lost, stolen or damaged/destroyed prescriptions or medications will not be replaced. Name:. Amandeep Brush XFN:[birth year] MR #:[id number] Provider Esther Duque MD  
*[id number]* BSMG-491 (5/16) Page 3 of 5 Initial [place] PRESCRIPTION RENEWALS/REFILLS: 
 
1. I will follow my controlled substance medication schedule as prescribed by my provider. 2. I understand and agree that I will make any requests for renewals or refills of my prescriptions only at the time of an office visit or during my providers regular office hours subject to the prescription refill requirements of the individual practice. 3. I understand that my provider may not call in prescriptions for controlled substances to my pharmacy. 4. I understand that my provider may adjust or discontinue these medications as deemed appropriate for my medical treatment plan. This Agreement does not guarantee the prescription of controlled medications. 5. I agree that if my medications are adjusted or discontinued, I will properly dispose of any remaining medications. I understand that I will be required to dispose of any remaining controlled medications prior to being provided with any prescriptions for other controlled medications. 6. I understand that the renewal of my prescription depends on my medical condition, my consistent participation, and my adherence with my treatment plan and this Agreement.  
 
7. I understand that if I do not keep an appointment with my provider, I may not receive a renewal or refill for my controlled substance medication. PRESCRIPTION MONITORING / DRUG TESTIN. I understand that my provider may require me to provide urine, saliva or blood for testing at any time. I understand that this testing will be used to monitor for safety and adherence with my treatment plan and this Agreement. 2. I understand that my provider may ask me to provide an observed urine specimen, which means that a nurse or other health care provider may watch me provide urine, and I agree to cooperate if I am asked to provide an observed specimen. 3. I understand that if I do not provide urine, saliva or blood samples within two (2) hours of my providers request, or other timeframe decided by my provider, my treatment plan could be changed, or my prescriptions and medications may be changed or ended. 4. I understand that urine, saliva and blood test results will be a part of my permanent medical record. Name:Regla Marcial RRF: MR #:[id number] Provider Nargis Mac MD  
*[id number]-570* BSMG-491 () Page 4 of 5 Initial [facility] 5. I understand that my provider is required to obtain a copy of my State Prescription Monitoring Program () Report at any time in order to safely prescribe medications. 6. I will bring all of my prescribed controlled substance medications in their original bottles to all of my scheduled appointments. 7. I understand that my provider may ask me to come to the practice with all of my prescribed medications for a random pill count at any time. I agree to cooperate if I am asked to come in for a random pill count. I understand that if I do not arrive in the timeframe decided by my provider, my treatment plan could be changed, or my prescriptions and medications may be changed or ended.  
 
COOPERATION WITH INVESTIGATIONS: 
 
1. I authorize my provider and my pharmacy to cooperate fully with any local, state, or federal law enforcement agency in the investigation of any possible misuse, sale, or other diversion of my controlled substance prescriptions or medications. RISKS: 
 
 
1. I understand that if I do not adhere to this Agreement in any way, my provider may change my prescriptions, stop prescribing controlled substances or end our provider-patient relationship. 2. If my provider decides to stop prescribing medication, or decides to end our provider-patient relationship,my provider may require that I taper my medications slowly. If necessary, my provider may also provide a prescription for other medications to treat my withdrawal symptoms. UNDERSTANDING THIS AGREEMENT: 
 
I understand that my provider may adjust or stop my prescriptions for controlled substances based on my medical condition and my treatment plan. I understand that this Agreement does not guarantee that I will be prescribed medications or controlled substances. I understand that controlled substances may be just one part 
of my treatment plan. My initial on each page and my signature below shows that I have read each page of this Agreement, I have had an opportunity to ask questions, and all of my questions have been answered to my satisfaction by my provider.  
 
By signing below, I agree to comply with this Agreement, and I understand that if I do not follow the Agreements listed above, my provider may stop 
 
 
 
_________________________________________  Date/Time 4/21/2021 10:18 AM   
             (Patient Signature)

## 2021-04-21 NOTE — PROGRESS NOTES
This note will not be viewable in [address] Michaelle Mayers is a 67 y.o. female and presents with Cholesterol Problem (follow up), GERD (follow up), Anxiety (follow up), and Insomnia (follow up)  . Subjective:  Mrs. Thor Paez presents today for follow-up of hyperlipidemia, monitoring statin therapy, GERD, irritable bowel, generalized anxiety, insomnia. She is doing well on her current medical regimen. She notes that while in [place] she did have some problems with her irritable bowel acting up. She took dicyclomine for this with good results. She still has some residual right lower abdominal discomfort that is very mild. She has no current changes in her stool. She is moving her bowels regularly. She has no dysuria or frequency of urination. She does note that she has decreased vision in her right eye. She has follow-up scheduled with ophthalmology for this. She states that there is a visual field deficit and there is a question of whether she may have had a stroke. She has not had any dysarthria or focal weakness. She does not recall having any sudden vision changes. She has never had carotid ultrasounds done previously to her knowledge.     Past Medical History:   Diagnosis Date    Arthritis     hands    Cancer ([address]     basal cell of face, squamous, and melanoma    Colitis 04/21/2018    [facility]     Colitis, ischemic [facility]) 9/26/2017    Duodenal ulcer, perforated ([facility] 9/26/2017    Dyspepsia and other specified disorders of function of stomach     GERD (gastroesophageal reflux disease)     History of rectal bleeding 04/2018    Two episodes; first one 19 years ago - labeled as a \"possible ischemic attack\"    Hypercholesterolemia     Hyperlipidemia 9/26/2017    IBS (irritable bowel syndrome) 9/26/2017    IGT (impaired glucose tolerance) 09/26/2017    Borderline Hgb A1C    Insomnia 9/26/2017    Melanoma ([facility] 9/26/2017    On statin therapy 9/26/2017    Osteopenia 9/26/2017  Pneumonia of right lung due to infectious organism 9/6/2019    PUD (peptic ulcer disease)     Temporal arteritis ([facility] 9/26/2017    Vitamin D deficiency 9/26/2017    Zoster 9/26/2017     Past Surgical History:   Procedure Laterality Date    COLONOSCOPY N/A 8/8/2018    COL[facility]i Herbert [facility] at [facility]OSCOPY    COLONOSCOPY,DIAGNOSTIC  3/17/2015         COLONOSCOPY,DIAGNOSTIC  8/8/2018         HX COLONOSCOPY      HX ENDOSCOPY      HX GYN  1984    hysterectomy    HX GYN  1980's    laparoscopy    HX TONSILLECTOMY      [place] ABDOMEN SURGERY PROC UNLISTED  1985    perforated ulcer    UPPER GI ENDOSCOPY,BIOPSY  8/8/2018          Allergies   Allergen Reactions    Augmentin [Amoxicillin-Pot Clavulanate] Unknown (comments)     \"hard on my stomach\"    Tetracycline Unknown (comments)     \"funny feeling in my mouth. ..years ago\"     Current Outpatient Medications   Medication Sig Dispense Refill    cefUROXime (CEFTIN) 250 mg tablet Take 250 mg by mouth every seven (7) days.  cholecalciferol (VITAMIN D3) (5000 Units/125 mcg) tab tablet Take 5,000 Units by mouth daily.  pyridoxine, vitamin B6, (Vitamin B-6) 100 mg tablet Take 100 mg by mouth daily.  estradioL (ESTRACE) 0.01 % (0.1 mg/gram) vaginal cream PLEASE SEE ATTACHED FOR DETAILED DIRECTIONS      tiZANidine (ZANAFLEX) 4 mg tablet Take 1 Tab by mouth three (3) times daily as needed for Pain. 30 Tab 0    dicyclomine (BENTYL) 10 mg capsule Take 1 Cap by mouth four (4) times daily as needed for Abdominal Cramps. 120 Cap 0    simvastatin (ZOCOR) 20 mg tablet TAKE 1 TABLET AT BEDTIME 90 Tab 3    omeprazole (PRILOSEC) 20 mg capsule TAKE 1 CAPSULE EVERY DAY 90 Cap 3    B.infantis-B.ani-B.long-B.bifi (PROBIOTIC 4X) 10-15 mg TbEC Take  by mouth as needed.  multivit,iron,minerals/lutein (CENTRUM SILVER ULTRA WOMEN'S PO) Take  by mouth.  ascorbic acid, vitamin C, (VITAMIN C) 500 mg tablet Take  by mouth.  Biotin 2,500 mcg cap Take 5,000 mg by mouth daily.  melatonin tab tablet Take 10 mg by mouth nightly as needed.  CALCIUM CARBONATE/VITAMIN D3 (CALTRATE WITH VITAMIN D3 PO) Take 2 Tabs by mouth daily.  aspirin 81 mg tablet Take 81 mg by mouth.  cranberry 500 mg Cap Take 500 mg by mouth daily. Social History     Socioeconomic History    Marital status:      Spouse name: Not on file    Number of children: Not on file    Years of education: Not on file    Highest education level: Not on file   Tobacco Use    Smoking status: Never Smoker    Smokeless tobacco: Never Used   Substance and Sexual Activity    Alcohol use: Yes     Comment: rarely    Drug use: No     Family History   Problem Relation Age of Onset    Cancer Mother         uterine    Stroke Maternal Grandfather        Review of Systems  Constitutional:  negative for fevers, chills, anorexia and weight loss  Eyes:    negative for visual disturbance and irritation  ENT:    negative for tinnitus,sore throat,nasal congestion,ear pains. hoarseness  Respiratory:     negative for cough, hemoptysis, dyspnea,wheezing  CV:    negative for chest pain, palpitations, lower extremity edema  GI:    negative for nausea, vomiting, diarrhea, abdominal pain,melena  Endo:               negative for polyuria,polydipsia,polyphagia,heat intolerance  Genitourinary : negative for  dysuria and hematuria  Integumentary: negative for rash and pruritus  Hematologic:   negative for easy bruising and gum/nose bleeding  Musculoskel:  negative for myalgias, arthralgias, back pain, muscle weakness, joint pain  Neurological:   negative for headaches, dizziness, vertigo, memory problems and gait   Behavl/Psych:  negative for feelings of anxiety, depression, mood changes  ROS otherwise negative      Objective:  Visit Vitals  /84 (BP 1 Location: Left upper arm, BP Patient Position: Sitting, BP Cuff Size: Large adult)   Pulse 68   Temp 97.6 °F (36.4 °C) (Oral)   Resp 16   Ht 5' 5\" (1.651 m)   Wt 151 lb (68.5 kg)   SpO2 98%   BMI 25.13 kg/m²     Physical Exam:   General appearance - alert, well appearing, and in no distress  Mental status - alert, oriented to person, place, and time  EYE-JENNIFER, EOMI, fundi normal, corneas normal, no foreign bodies  ENT-ENT exam normal, no neck nodes or sinus tenderness  Nose - normal and patent, no erythema, discharge or polyps  Mouth - mucous membranes moist, pharynx normal without lesions  Neck - supple, no significant adenopathy   Chest - clear to auscultation, no wheezes, rales or rhonchi, symmetric air entry   Heart - normal rate, regular rhythm, normal S1, S2, no murmurs, rubs, clicks or gallops   Abdomen - soft, nontender, nondistended, no masses or organomegaly  Lymph- no adenopathy palpable  Ext-peripheral pulses normal, no pedal edema, no clubbing or cyanosis  Skin-Warm and dry. no hyperpigmentation, vitiligo, or suspicious lesions  Neuro -alert, oriented, normal speech, no focal findings or movement disorder noted      Assessment/Plan:  Diagnoses and all orders for this visit:    1. Gastroesophageal reflux disease without esophagitis    2. IGT (impaired glucose tolerance)  -     HEMOGLOBIN A1C WITH EAG; Future    3. Osteopenia, unspecified location    4. Mixed hyperlipidemia    5. Vitamin D deficiency  -     VITAMIN D, 25 HYDROXY; Future    6. On statin therapy  -     LIPID PANEL; Future  -     METABOLIC PANEL, COMPREHENSIVE; Future  -     CK; Future    7. Irritable bowel syndrome, unspecified type    8. Fatigue, unspecified type  -     CBC WITH AUTOMATED DIFF; Future    9. Frequency of micturition  -     URINALYSIS W/ RFLX MICROSCOPIC; Future    10. Cerebrovascular disease  -     DUPLEX CAROTID BILATERAL; Future    Other orders  -     tiZANidine (ZANAFLEX) 4 mg tablet; Take 1 Tab by mouth three (3) times daily as needed for Pain.  -     dicyclomine (BENTYL) 10 mg capsule;  Take 1 Cap by mouth four (4) times daily as needed for Abdominal Cramps. ICD-10-CM ICD-9-CM    1. Gastroesophageal reflux disease without esophagitis  K21.9 530.81    2. IGT (impaired glucose tolerance)  R73.02 790.22 HEMOGLOBIN A1C WITH EAG      HEMOGLOBIN A1C WITH EAG   3. Osteopenia, unspecified location  M85.80 733.90    4. Mixed hyperlipidemia  E78.2 272.2    5. Vitamin D deficiency  E55.9 268.9 VITAMIN D, 25 HYDROXY      VITAMIN D, 25 HYDROXY   6. On statin therapy  Z79.899 V58.69 LIPID PANEL      METABOLIC PANEL, COMPREHENSIVE      CK      CK      METABOLIC PANEL, COMPREHENSIVE      LIPID PANEL   7. Irritable bowel syndrome, unspecified type  K58.9 564.1    8. Fatigue, unspecified type  R53.83 780.79 CBC WITH AUTOMATED DIFF      CBC WITH AUTOMATED DIFF   9. Frequency of micturition  R35.0 788.41 URINALYSIS W/ RFLX MICROSCOPIC   10. Cerebrovascular disease  I67.9 437.9 DUPLEX CAROTID BILATERAL     Plan:    Continue current medical regimen as outlined above. Further recommendations based on labs as ordered. We briefly reviewed her previous lipid panel which was excellent on her current dose of simvastatin. She will be referred for carotid Doppler ultrasound to rule out carotid atherosclerosis as a cause of vision changes. Depending on the results of her ophthalmology exam and visual field deficits she may need further evaluation by a neurologist or consider head CT or MRI. Continue risk reduction with excellent cholesterol control and aspirin 81 mg daily. Follow-up and Dispositions    · Return in about 6 months (around 10/21/2021) for follow up. I have reviewed with the patient details of the assessment and plan and all questions were answered. Relevent patient education was performed. Verbal and/or written instructions (see AVS) provided. The most recent lab findings were reviewed with the patient. Plan was discussed with patient who verbally expressed understanding.     An After Visit Summary was printed and given to the patient.     Mary Kate Joseph MD

## 2021-04-22 LAB
25(OH)D3 SERPL-MCNC: 52.7 NG/ML (ref 30–100)
ALBUMIN SERPL-MCNC: 4.3 G/DL (ref 3.5–5)
ALBUMIN/GLOB SERPL: 1.4 {RATIO} (ref 1.1–2.2)
ALP SERPL-CCNC: 122 U/L (ref 45–117)
ALT SERPL-CCNC: 28 U/L (ref 12–78)
ANION GAP SERPL CALC-SCNC: 5 MMOL/L (ref 5–15)
AST SERPL-CCNC: 22 U/L (ref 15–37)
BASOPHILS # BLD: 0 K/UL (ref 0–0.1)
BASOPHILS NFR BLD: 1 % (ref 0–1)
BILIRUB SERPL-MCNC: 0.4 MG/DL (ref 0.2–1)
BUN SERPL-MCNC: 15 MG/DL (ref 6–20)
BUN/CREAT SERPL: 22 (ref 12–20)
CALCIUM SERPL-MCNC: 9.6 MG/DL (ref 8.5–10.1)
CHLORIDE SERPL-SCNC: 106 MMOL/L (ref 97–108)
CHOLEST SERPL-MCNC: 167 MG/DL
CK SERPL-CCNC: 91 U/L (ref 26–192)
CO2 SERPL-SCNC: 29 MMOL/L (ref 21–32)
CREAT SERPL-MCNC: 0.69 MG/DL (ref 0.55–1.02)
DIFFERENTIAL METHOD BLD: NORMAL
EOSINOPHIL # BLD: 0.1 K/UL (ref 0–0.4)
EOSINOPHIL NFR BLD: 1 % (ref 0–7)
ERYTHROCYTE [DISTWIDTH] IN BLOOD BY AUTOMATED COUNT: 14.1 % (ref 11.5–14.5)
EST. AVERAGE GLUCOSE BLD GHB EST-MCNC: 117 MG/DL
GLOBULIN SER CALC-MCNC: 3 G/DL (ref 2–4)
GLUCOSE SERPL-MCNC: 94 MG/DL (ref 65–100)
HBA1C MFR BLD: 5.7 % (ref 4–5.6)
HCT VFR BLD AUTO: 43.1 % (ref 35–47)
HDLC SERPL-MCNC: 73 MG/DL
HDLC SERPL: 2.3 {RATIO} (ref 0–5)
HGB BLD-MCNC: 13.9 G/DL (ref 11.5–16)
IMM GRANULOCYTES # BLD AUTO: 0 K/UL (ref 0–0.04)
IMM GRANULOCYTES NFR BLD AUTO: 0 % (ref 0–0.5)
LDLC SERPL CALC-MCNC: 78.4 MG/DL (ref 0–100)
LIPID PROFILE,FLP: NORMAL
LYMPHOCYTES # BLD: 2.1 K/UL (ref 0.8–3.5)
LYMPHOCYTES NFR BLD: 33 % (ref 12–49)
MCH RBC QN AUTO: 30.8 PG (ref 26–34)
MCHC RBC AUTO-ENTMCNC: 32.3 G/DL (ref 30–36.5)
MCV RBC AUTO: 95.4 FL (ref 80–99)
MONOCYTES # BLD: 0.4 K/UL (ref 0–1)
MONOCYTES NFR BLD: 7 % (ref 5–13)
NEUTS SEG # BLD: 3.7 K/UL (ref 1.8–8)
NEUTS SEG NFR BLD: 58 % (ref 32–75)
NRBC # BLD: 0 K/UL (ref 0–0.01)
NRBC BLD-RTO: 0 PER 100 WBC
PLATELET # BLD AUTO: 215 K/UL (ref 150–400)
PMV BLD AUTO: 11.6 FL (ref 8.9–12.9)
POTASSIUM SERPL-SCNC: 4.2 MMOL/L (ref 3.5–5.1)
PROT SERPL-MCNC: 7.3 G/DL (ref 6.4–8.2)
RBC # BLD AUTO: 4.52 M/UL (ref 3.8–5.2)
SODIUM SERPL-SCNC: 140 MMOL/L (ref 136–145)
TRIGL SERPL-MCNC: 78 MG/DL (ref ?–150)
VLDLC SERPL CALC-MCNC: 15.6 MG/DL
WBC # BLD AUTO: 6.3 K/UL (ref 3.6–11)

## 2021-04-28 ENCOUNTER — HOSPITAL ENCOUNTER (OUTPATIENT)
Dept: VASCULAR SURGERY | Age: 76
Discharge: HOME | End: 2021-04-28
Attending: INTERNAL MEDICINE
Payer: MEDICARE

## 2021-04-28 DIAGNOSIS — I67.9 CEREBROVASCULAR DISEASE: ICD-10-CM

## 2021-04-28 LAB
LEFT CCA DIST DIAS: 20.6 CM/S
LEFT CCA DIST SYS: 69.8 CM/S
LEFT CCA PROX DIAS: 10.1 CM/S
LEFT CCA PROX SYS: 69 CM/S
LEFT ECA DIAS: 9.91 CM/S
LEFT ECA SYS: 51.5 CM/S
LEFT ICA DIST DIAS: 15.3 CM/S
LEFT ICA DIST SYS: 49.6 CM/S
LEFT ICA MID DIAS: 13 CM/S
LEFT ICA MID SYS: 37.2 CM/S
LEFT ICA PROX DIAS: 10.9 CM/S
LEFT ICA PROX SYS: 38.4 CM/S
LEFT ICA/CCA SYS: 0.71
LEFT SUBCLAVIAN DIAS: 0 CM/S
LEFT SUBCLAVIAN SYS: 73.7 CM/S
LEFT VERTEBRAL DIAS: 17.99 CM/S
LEFT VERTEBRAL SYS: 56.8 CM/S
RIGHT CCA DIST DIAS: 20.8 CM/S
RIGHT CCA DIST SYS: 88.7 CM/S
RIGHT CCA PROX DIAS: 16 CM/S
RIGHT CCA PROX SYS: 87.6 CM/S
RIGHT ECA DIAS: 0 CM/S
RIGHT ECA SYS: 53 CM/S
RIGHT ICA DIST DIAS: 15.9 CM/S
RIGHT ICA DIST SYS: 47.8 CM/S
RIGHT ICA MID DIAS: 16.7 CM/S
RIGHT ICA MID SYS: 51.7 CM/S
RIGHT ICA PROX DIAS: 11.5 CM/S
RIGHT ICA PROX SYS: 56.8 CM/S
RIGHT ICA/CCA SYS: 0.6
RIGHT SUBCLAVIAN DIAS: 0 CM/S
RIGHT SUBCLAVIAN SYS: 102.3 CM/S
RIGHT VERTEBRAL DIAS: 16.04 CM/S
RIGHT VERTEBRAL SYS: 43.3 CM/S

## 2021-04-28 PROCEDURE — 93880 EXTRACRANIAL BILAT STUDY: CPT

## 2021-05-03 ENCOUNTER — TELEPHONE (OUTPATIENT)
Dept: INTERNAL MEDICINE CLINIC | Age: 76
End: 2021-05-03

## 2021-06-03 NOTE — TELEPHONE ENCOUNTER
Last Refill: 4/21/21  Last Visit: 4/21/2021   Next Visit: 10/29/2021    Requested Prescriptions     Pending Prescriptions Disp Refills    tiZANidine (ZANAFLEX) 4 mg tablet 30 Tablet 0     Sig: Take 1 Tablet by mouth three (3) times daily as needed for Pain.

## 2021-06-04 RX ORDER — TIZANIDINE 4 MG/1
4 TABLET ORAL
Qty: 30 TABLET | Refills: 0 | Status: SHIPPED | OUTPATIENT
Start: 2021-06-04

## 2021-06-18 ENCOUNTER — TRANSCRIBE ORDER (OUTPATIENT)
Dept: SCHEDULING | Age: 76
End: 2021-06-18

## 2021-06-18 DIAGNOSIS — H46.9 OPTIC NEURITIS: Primary | ICD-10-CM

## 2021-07-03 ENCOUNTER — HOSPITAL ENCOUNTER (OUTPATIENT)
Dept: MRI IMAGING | Age: 76
Discharge: HOME | End: 2021-07-03
Attending: STUDENT IN AN ORGANIZED HEALTH CARE EDUCATION/TRAINING PROGRAM
Payer: MEDICARE

## 2021-07-03 DIAGNOSIS — H46.9 OPTIC NEURITIS: ICD-10-CM

## 2021-07-03 PROCEDURE — 74011636320 HC RX REV CODE- 636/320: Performed by: STUDENT IN AN ORGANIZED HEALTH CARE EDUCATION/TRAINING PROGRAM

## 2021-07-03 PROCEDURE — 70543 MRI ORBT/FAC/NCK W/O &W/DYE: CPT

## 2021-07-03 PROCEDURE — A9576 INJ PROHANCE MULTIPACK: HCPCS | Performed by: STUDENT IN AN ORGANIZED HEALTH CARE EDUCATION/TRAINING PROGRAM

## 2021-07-03 RX ADMIN — GADOTERIDOL 14 ML: 279.3 INJECTION, SOLUTION INTRAVENOUS at 14:39

## 2021-08-01 ENCOUNTER — HOSPITAL ENCOUNTER (EMERGENCY)
Age: 76
Discharge: HOME | End: 2021-08-01
Attending: EMERGENCY MEDICINE
Payer: MEDICARE

## 2021-08-01 ENCOUNTER — APPOINTMENT (OUTPATIENT)
Dept: CT IMAGING | Age: 76
End: 2021-08-01
Attending: EMERGENCY MEDICINE
Payer: MEDICARE

## 2021-08-01 VITALS
HEART RATE: 59 BPM | RESPIRATION RATE: 18 BRPM | BODY MASS INDEX: 25.25 KG/M2 | DIASTOLIC BLOOD PRESSURE: 77 MMHG | TEMPERATURE: 97.7 F | HEIGHT: 64 IN | SYSTOLIC BLOOD PRESSURE: 153 MMHG | WEIGHT: 147.93 LBS | OXYGEN SATURATION: 93 %

## 2021-08-01 DIAGNOSIS — R10.31 ABDOMINAL PAIN, RIGHT LOWER QUADRANT: Primary | ICD-10-CM

## 2021-08-01 LAB
ALBUMIN SERPL-MCNC: 3.6 G/DL (ref 3.5–5)
ALBUMIN/GLOB SERPL: 1 {RATIO} (ref 1.1–2.2)
ALP SERPL-CCNC: 103 U/L (ref 45–117)
ALT SERPL-CCNC: 27 U/L (ref 12–78)
ANION GAP SERPL CALC-SCNC: 4 MMOL/L (ref 5–15)
APPEARANCE UR: CLEAR
AST SERPL-CCNC: 33 U/L (ref 15–37)
BACTERIA URNS QL MICRO: NEGATIVE /HPF
BASOPHILS # BLD: 0 K/UL (ref 0–0.1)
BASOPHILS NFR BLD: 0 % (ref 0–1)
BILIRUB SERPL-MCNC: 0.3 MG/DL (ref 0.2–1)
BILIRUB UR QL: NEGATIVE
BUN SERPL-MCNC: 13 MG/DL (ref 6–20)
BUN/CREAT SERPL: 18 (ref 12–20)
CALCIUM SERPL-MCNC: 8.8 MG/DL (ref 8.5–10.1)
CHLORIDE SERPL-SCNC: 100 MMOL/L (ref 97–108)
CO2 SERPL-SCNC: 29 MMOL/L (ref 21–32)
COLOR UR: ABNORMAL
CREAT SERPL-MCNC: 0.72 MG/DL (ref 0.55–1.02)
DIFFERENTIAL METHOD BLD: NORMAL
EOSINOPHIL # BLD: 0 K/UL (ref 0–0.4)
EOSINOPHIL NFR BLD: 1 % (ref 0–7)
EPITH CASTS URNS QL MICRO: ABNORMAL /LPF
ERYTHROCYTE [DISTWIDTH] IN BLOOD BY AUTOMATED COUNT: 13.3 % (ref 11.5–14.5)
GLOBULIN SER CALC-MCNC: 3.5 G/DL (ref 2–4)
GLUCOSE SERPL-MCNC: 97 MG/DL (ref 65–100)
GLUCOSE UR STRIP.AUTO-MCNC: NEGATIVE MG/DL
HCT VFR BLD AUTO: 39.7 % (ref 35–47)
HGB BLD-MCNC: 13.5 G/DL (ref 11.5–16)
HGB UR QL STRIP: NEGATIVE
IMM GRANULOCYTES # BLD AUTO: 0 K/UL (ref 0–0.04)
IMM GRANULOCYTES NFR BLD AUTO: 0 % (ref 0–0.5)
KETONES UR QL STRIP.AUTO: NEGATIVE MG/DL
LEUKOCYTE ESTERASE UR QL STRIP.AUTO: ABNORMAL
LIPASE SERPL-CCNC: 46 U/L (ref 73–393)
LYMPHOCYTES # BLD: 2.5 K/UL (ref 0.8–3.5)
LYMPHOCYTES NFR BLD: 39 % (ref 12–49)
MCH RBC QN AUTO: 30.9 PG (ref 26–34)
MCHC RBC AUTO-ENTMCNC: 34 G/DL (ref 30–36.5)
MCV RBC AUTO: 90.8 FL (ref 80–99)
MONOCYTES # BLD: 0.4 K/UL (ref 0–1)
MONOCYTES NFR BLD: 7 % (ref 5–13)
NEUTS SEG # BLD: 3.3 K/UL (ref 1.8–8)
NEUTS SEG NFR BLD: 53 % (ref 32–75)
NITRITE UR QL STRIP.AUTO: NEGATIVE
NRBC # BLD: 0 K/UL (ref 0–0.01)
NRBC BLD-RTO: 0 PER 100 WBC
PH UR STRIP: 5.5 [PH] (ref 5–8)
PLATELET # BLD AUTO: 221 K/UL (ref 150–400)
PMV BLD AUTO: 10.4 FL (ref 8.9–12.9)
POTASSIUM SERPL-SCNC: 4.4 MMOL/L (ref 3.5–5.1)
PROT SERPL-MCNC: 7.1 G/DL (ref 6.4–8.2)
PROT UR STRIP-MCNC: NEGATIVE MG/DL
RBC # BLD AUTO: 4.37 M/UL (ref 3.8–5.2)
RBC #/AREA URNS HPF: ABNORMAL /HPF (ref 0–5)
SODIUM SERPL-SCNC: 133 MMOL/L (ref 136–145)
SP GR UR REFRACTOMETRY: 1.01 (ref 1–1.03)
UROBILINOGEN UR QL STRIP.AUTO: 0.2 EU/DL (ref 0.2–1)
WBC # BLD AUTO: 6.3 K/UL (ref 3.6–11)
WBC URNS QL MICRO: ABNORMAL /HPF (ref 0–4)

## 2021-08-01 PROCEDURE — 85025 COMPLETE CBC W/AUTO DIFF WBC: CPT

## 2021-08-01 PROCEDURE — 83690 ASSAY OF LIPASE: CPT

## 2021-08-01 PROCEDURE — 74177 CT ABD & PELVIS W/CONTRAST: CPT

## 2021-08-01 PROCEDURE — 99283 EMERGENCY DEPT VISIT LOW MDM: CPT

## 2021-08-01 PROCEDURE — 81001 URINALYSIS AUTO W/SCOPE: CPT

## 2021-08-01 PROCEDURE — 74011000636 HC RX REV CODE- 636: Performed by: EMERGENCY MEDICINE

## 2021-08-01 PROCEDURE — 80053 COMPREHEN METABOLIC PANEL: CPT

## 2021-08-01 PROCEDURE — 36415 COLL VENOUS BLD VENIPUNCTURE: CPT

## 2021-08-01 RX ADMIN — IOPAMIDOL 100 ML: 755 INJECTION, SOLUTION INTRAVENOUS at 21:15

## 2021-08-02 NOTE — ED NOTES
Patient prepared for discharge, denies any pain or discomfort at this time.  Requested to speak to ED MD. Dr Senait Yañez in room

## 2021-08-02 NOTE — ED PROVIDER NOTES
EMERGENCY DEPARTMENT HISTORY AND PHYSICAL EXAM      Date: 8/1/2021  Patient Name: Merlyn Escobar    History of Presenting Illness     Chief Complaint   Patient presents with    Abdominal Pain     tuesday right lower abdominal pain and then wednesday lower abdomen and this afternoon the pain returned to the lower right quadrant. no nausea no vomiting but decrease appetite       History Provided By: Patient    HPI: Merlyn Escobar, 67 y.o. female  With past medical history of ischemic colitis presenting today with abdominal discomfort. The patient says that she started having some right lower quadrant abdominal pain and also had an episode of diarrhea that was nonbloody yesterday. Today she had increasing pain so she presented to the emergency department. She says that her pain is actually improved significantly after taking a dose of dicyclomine. Patient has not had any nausea or vomiting. Denies any fevers or chills. No dysuria or hematuria associate with her present illness. Patient denies any bloody stools. She says this feels different from  time that she had ischemic colitis. No exacerbating or alleviating factors. There are no other complaints, changes, or physical findings at this time. PCP: Sandra Douglas MD    No current facility-administered medications on file prior to encounter. Current Outpatient Medications on File Prior to Encounter   Medication Sig Dispense Refill    tiZANidine (ZANAFLEX) 4 mg tablet Take 1 Tablet by mouth three (3) times daily as needed for Pain. 30 Tablet 0    cefUROXime (CEFTIN) 250 mg tablet Take 250 mg by mouth every seven (7) days.  cholecalciferol (VITAMIN D3) (5000 Units/125 mcg) tab tablet Take 5,000 Units by mouth daily.  pyridoxine, vitamin B6, (Vitamin B-6) 100 mg tablet Take 100 mg by mouth daily.       estradioL (ESTRACE) 0.01 % (0.1 mg/gram) vaginal cream PLEASE SEE ATTACHED FOR DETAILED DIRECTIONS      dicyclomine (BENTYL) 10 mg capsule Take 1 Cap by mouth four (4) times daily as needed for Abdominal Cramps. 120 Cap 0    simvastatin (ZOCOR) 20 mg tablet TAKE 1 TABLET AT BEDTIME 90 Tab 3    omeprazole (PRILOSEC) 20 mg capsule TAKE 1 CAPSULE EVERY DAY 90 Cap 3    B.infantis-B.ani-B.long-B.bifi (PROBIOTIC 4X) 10-15 mg TbEC Take  by mouth as needed.  multivit,iron,minerals/lutein (CENTRUM SILVER ULTRA WOMEN'S PO) Take  by mouth.  ascorbic acid, vitamin C, (VITAMIN C) 500 mg tablet Take  by mouth.  Biotin 2,500 mcg cap Take 5,000 mg by mouth daily.  melatonin tab tablet Take 10 mg by mouth nightly as needed.  CALCIUM CARBONATE/VITAMIN D3 (CALTRATE WITH VITAMIN D3 PO) Take 2 Tabs by mouth daily.  aspirin 81 mg tablet Take 81 mg by mouth.  cranberry 500 mg Cap Take 500 mg by mouth daily.          Past History     Past Medical History:  Past Medical History:   Diagnosis Date    Arthritis     hands    Cancer ([address]     basal cell of face, squamous, and melanoma    Colitis 04/21/2018    [facility] ER     Colitis, ischemic [facility]) 9/26/2017    Duodenal ulcer, perforated ([address] 9/26/2017    Dyspepsia and other specified disorders of function of stomach     GERD (gastroesophageal reflux disease)     History of rectal bleeding 04/2018    Two episodes; first one 19 years ago - labeled as a \"possible ischemic attack\"    Hypercholesterolemia     Hyperlipidemia 9/26/2017    IBS (irritable bowel syndrome) 9/26/2017    IGT (impaired glucose tolerance) 09/26/2017    Borderline Hgb A1C    Insomnia 9/26/2017    Melanoma ([facility] 9/26/2017    On statin therapy 9/26/2017    Osteopenia 9/26/2017    Pneumonia of right lung due to infectious organism 9/6/2019    PUD (peptic ulcer disease)     Temporal arteritis ([address] 9/26/2017    Vitamin D deficiency 9/26/2017    Zoster 9/26/2017       Past Surgical History:  Past Surgical History:   Procedure Laterality Date    COLONOSCOPY N/A 8/8/2018    [facility] Johnathon [facility]ez [facility]    COLONOSCOPY,DIAGNOSTIC  3/17/2015         COLONOSCOPY,DIAGNOSTIC  8/8/2018         HX COLONOSCOPY      HX ENDOSCOPY      HX GYN  1984    hysterectomy    HX GYN  1980's    laparoscopy    HX TONSILLECTOMY      [place] ABDOMEN SURGERY PROC UNLISTED  1985    perforated ulcer    UPPER GI ENDOSCOPY,BIOPSY  8/8/2018            Family History:  Family History   Problem Relation Age of Onset    Cancer Mother         uterine    Stroke Maternal Grandfather        Social History:  Social History     Tobacco Use    Smoking status: Never Smoker    Smokeless tobacco: Never Used   Substance Use Topics    Alcohol use: Yes     Comment: rarely    Drug use: No       Allergies: Allergies   Allergen Reactions    Augmentin [Amoxicillin-Pot Clavulanate] Unknown (comments)     \"hard on my stomach\"    Tetracycline Unknown (comments)     \"funny feeling in my mouth. ..years ago\"         Review of Systems   Constitutional: No  fever  Skin: No  rash  HEENT: No  nasal congestion  Resp: No cough  CV: No chest pain  GI: No vomiting  : No dysuria  MSK: No joint pain  Neuro: No numbness  Psych: No anxiety      Physical Exam     Patient Vitals for the past 12 hrs:   Temp Pulse Resp BP SpO2   08/01/21 1846 98.3 °F (36.8 °C) 63 14 (!) 145/79 97 %     General: alert, No acute distress  Eyes: EOMI, normal conjunctiva  ENT: moist mucous membranes. Neck: Active, full ROM of neck. Skin: No rashes. no jaundice              Lungs: Equal chest expansion. no respiratory distress. clear to auscultation bilaterally No accessory muscle usage  Heart: regular rate     no peripheral edema   2+ radial pulses and DPs bilaterally  Abd:  non distended soft, Tender in the RLQ. No rebound tenderness. No guarding  Back: Full ROM  MSK: Full, active ROM in all 4 extremities.    Neuro: Alert and oriented to Person, Place, Time and Situation; normal speech;   Psych: Cooperative with exam; Appropriate mood and affect             Diagnostic Study Results     Labs -     Recent Results (from the past 12 hour(s))   METABOLIC PANEL, COMPREHENSIVE    Collection Time: 08/01/21  7:08 PM   Result Value Ref Range    Sodium 133 (L) 136 - 145 mmol/L    Potassium 4.4 3.5 - 5.1 mmol/L    Chloride 100 97 - 108 mmol/L    CO2 29 21 - 32 mmol/L    Anion gap 4 (L) 5 - 15 mmol/L    Glucose 97 65 - 100 mg/dL    BUN 13 6 - 20 MG/DL    Creatinine 0.72 0.55 - 1.02 MG/DL    BUN/Creatinine ratio 18 12 - 20      GFR est AA >60 >60 ml/min/1.73m2    GFR est non-AA >60 >60 ml/min/1.73m2    Calcium 8.8 8.5 - 10.1 MG/DL    Bilirubin, total 0.3 0.2 - 1.0 MG/DL    ALT (SGPT) 27 12 - 78 U/L    AST (SGOT) 33 15 - 37 U/L    Alk. phosphatase 103 45 - 117 U/L    Protein, total 7.1 6.4 - 8.2 g/dL    Albumin 3.6 3.5 - 5.0 g/dL    Globulin 3.5 2.0 - 4.0 g/dL    A-G Ratio 1.0 (L) 1.1 - 2.2     LIPASE    Collection Time: 08/01/21  7:08 PM   Result Value Ref Range    Lipase 46 (L) 73 - 393 U/L   CBC WITH AUTOMATED DIFF    Collection Time: 08/01/21  7:08 PM   Result Value Ref Range    WBC 6.3 3.6 - 11.0 K/uL    RBC 4.37 3.80 - 5.20 M/uL    HGB 13.5 11.5 - 16.0 g/dL    HCT 39.7 35.0 - 47.0 %    MCV 90.8 80.0 - 99.0 FL    MCH 30.9 26.0 - 34.0 PG    MCHC 34.0 30.0 - 36.5 g/dL    RDW 13.3 11.5 - 14.5 %    PLATELET 665 305 - 717 K/uL    MPV 10.4 8.9 - 12.9 FL    NRBC 0.0 0  WBC    ABSOLUTE NRBC 0.00 0.00 - 0.01 K/uL    NEUTROPHILS 53 32 - 75 %    LYMPHOCYTES 39 12 - 49 %    MONOCYTES 7 5 - 13 %    EOSINOPHILS 1 0 - 7 %    BASOPHILS 0 0 - 1 %    IMMATURE GRANULOCYTES 0 0.0 - 0.5 %    ABS. NEUTROPHILS 3.3 1.8 - 8.0 K/UL    ABS. LYMPHOCYTES 2.5 0.8 - 3.5 K/UL    ABS. MONOCYTES 0.4 0.0 - 1.0 K/UL    ABS. EOSINOPHILS 0.0 0.0 - 0.4 K/UL    ABS. BASOPHILS 0.0 0.0 - 0.1 K/UL    ABS. IMM.  GRANS. 0.0 0.00 - 0.04 K/UL    DF AUTOMATED     URINALYSIS W/ RFLX MICROSCOPIC    Collection Time: 08/01/21  8:23 PM   Result Value Ref Range    Color YELLOW/STRAW      Appearance CLEAR CLEAR      Specific gravity 1.006 1.003 - 1.030      pH (UA) 5.5 5.0 - 8.0      Protein Negative NEG mg/dL    Glucose Negative NEG mg/dL    Ketone Negative NEG mg/dL    Bilirubin Negative NEG      Blood Negative NEG      Urobilinogen 0.2 0.2 - 1.0 EU/dL    Nitrites Negative NEG      Leukocyte Esterase SMALL (A) NEG      WBC 5-10 0 - 4 /hpf    RBC 0-5 0 - 5 /hpf    Epithelial cells FEW FEW /lpf    Bacteria Negative NEG /hpf       Radiologic Studies -   CT ABD PELV W CONT   Final Result   No acute findings or findings to correlate with right lower quadrant   abdominal pain. Incidentals as above including colonic diverticulosis and   cholecystolithiasis. CT Results  (Last 48 hours)               08/01/21 2115  CT ABD PELV W CONT Final result    Impression:  No acute findings or findings to correlate with right lower quadrant   abdominal pain. Incidentals as above including colonic diverticulosis and   cholecystolithiasis. Narrative:  EXAM: CT ABD PELV W CONT       INDICATION: RLQ pain       COMPARISON: CT 2/19/2015. CONTRAST: 100 mL of Isovue-370. TECHNIQUE:    Following the uneventful intravenous administration of contrast, thin axial   images were obtained through the abdomen and pelvis. Coronal and sagittal   reconstructions were generated. Oral contrast was not administered. CT dose   reduction was achieved through use of a standardized protocol tailored for this   examination and automatic exposure control for dose modulation. FINDINGS:    LOWER THORAX: No significant abnormality in the incidentally imaged lower chest.   LIVER: No mass. BILIARY TREE: Calcified gallstones are within the gallbladder lumen with an   otherwise unremarkable appearance to gallbladder. CBD is not dilated. SPLEEN: within normal limits. PANCREAS: No mass or ductal dilatation. ADRENALS: Unremarkable. KIDNEYS: No mass, calculus, or hydronephrosis. STOMACH: Unremarkable.    SMALL BOWEL: No dilatation or wall thickening. COLON: Noninflamed appearing left and sigmoid colon diverticula. No dilation or   wall thickening. APPENDIX: Not seen. No secondary signs of appendicitis. PERITONEUM: No ascites or pneumoperitoneum. RETROPERITONEUM: Atherosclerotic calcification without aneurysm or dissection. No enlarged lymphadenopathy. REPRODUCTIVE ORGANS: Uterus and ovaries are surgically absent. URINARY BLADDER: No mass or calculus. BONES: Degenerative spine change. No acute fracture or aggressive lesion. ABDOMINAL WALL: No mass or hernia. ADDITIONAL COMMENTS: N/A               CXR Results  (Last 48 hours)    None          Medical Decision Making   I am the first provider for this patient. I reviewed the vital signs, available nursing notes, past medical history, past surgical history, family history and social history. Vital Signs-Reviewed the patient's vital signs. Patient Vitals for the past 12 hrs:   Temp Pulse Resp BP SpO2   08/01/21 1846 98.3 °F (36.8 °C) 63 14 (!) 145/79 97 %       Provider Notes (Medical Decision Making):     Differential Diagnosis: Appendicitis, colitis, electrolyte derangement, urinary tract infection    Initial Plan: We will obtain CT imaging, laboratory studies, urinalysis sample and reassess. ED Course:   Initial assessment performed. The patients presenting problems have been discussed, and they are in agreement with the care plan formulated and outlined with them. I have encouraged them to ask questions as they arise throughout their visit. ED Course as of Aug 01 2253   Rene Damon Aug 01, 2021   2201 On my interpretation of the patient's laboratory studies there is a small amount of leukocyte esterase, but no nitrites in the urine, CBC is unremarkable, metabolic panel unremarkable. [NW]   8014 On my interpretation patient CT there is no evidence of acute abnormality.     [NW]   1716 Presenting with right lower quadrant abdominal pain, no evidence of abnormality on CT, laboratory studies are largely unremarkable, patient is discharged with return precautions. [NW]      ED Course User Index  [NW] Erik Dc MD       I, Melo Mcguire MD, am the attending of record for this patient encounter. Dispo: Discharged. The patient has been re-evaluated and is ready for discharge. Reviewed available results with patient. Counseled patient on diagnosis and care plan. Patient has expressed understanding, and all questions have been answered. Patient agrees with plan and agrees to follow up as recommended, or to return to the ED if their symptoms worsen. Discharge instructions have been provided and explained to the patient, along with reasons to return to the ED. PLAN:  Current Discharge Medication List        2. Follow-up Information     Follow up With Specialties Details Why Contact Amy Reardon MD Internal Medicine  As needed [address]  [phone]          3. Return to ED if worse       Diagnosis     Clinical Impression:   1. Abdominal pain, right lower quadrant        Attestations:    Melo Mcguire MD    Please note that this dictation was completed with [employer], the computer voice recognition software. Quite often unanticipated grammatical, syntax, homophones, and other interpretive errors are inadvertently transcribed by the computer software. Please disregard these errors. Please excuse any errors that have escaped final proofreading. Thank you.

## 2021-10-29 ENCOUNTER — OFFICE VISIT (OUTPATIENT)
Dept: INTERNAL MEDICINE CLINIC | Age: 76
End: 2021-10-29
Payer: MEDICARE

## 2021-10-29 VITALS
HEART RATE: 70 BPM | OXYGEN SATURATION: 97 % | BODY MASS INDEX: 25.95 KG/M2 | SYSTOLIC BLOOD PRESSURE: 134 MMHG | WEIGHT: 152 LBS | DIASTOLIC BLOOD PRESSURE: 78 MMHG | RESPIRATION RATE: 18 BRPM | HEIGHT: 64 IN

## 2021-10-29 DIAGNOSIS — Z00.00 MEDICARE ANNUAL WELLNESS VISIT, SUBSEQUENT: Primary | ICD-10-CM

## 2021-10-29 DIAGNOSIS — E55.9 VITAMIN D DEFICIENCY: ICD-10-CM

## 2021-10-29 DIAGNOSIS — Z13.6 SCREENING FOR ISCHEMIC HEART DISEASE: ICD-10-CM

## 2021-10-29 DIAGNOSIS — R73.02 IGT (IMPAIRED GLUCOSE TOLERANCE): ICD-10-CM

## 2021-10-29 DIAGNOSIS — R35.0 FREQUENCY OF MICTURITION: ICD-10-CM

## 2021-10-29 DIAGNOSIS — K21.9 GASTROESOPHAGEAL REFLUX DISEASE WITHOUT ESOPHAGITIS: ICD-10-CM

## 2021-10-29 DIAGNOSIS — M85.80 OSTEOPENIA, UNSPECIFIED LOCATION: ICD-10-CM

## 2021-10-29 DIAGNOSIS — E78.2 MIXED HYPERLIPIDEMIA: ICD-10-CM

## 2021-10-29 DIAGNOSIS — Z13.1 SCREENING FOR DIABETES MELLITUS: ICD-10-CM

## 2021-10-29 DIAGNOSIS — Z13.31 SCREENING FOR DEPRESSION: ICD-10-CM

## 2021-10-29 DIAGNOSIS — Z79.899 ON STATIN THERAPY: ICD-10-CM

## 2021-10-29 LAB
25(OH)D3 SERPL-MCNC: 41.1 NG/ML (ref 30–100)
ALBUMIN SERPL-MCNC: 4.1 G/DL (ref 3.5–5)
ALBUMIN/GLOB SERPL: 1.3 {RATIO} (ref 1.1–2.2)
ALP SERPL-CCNC: 114 U/L (ref 45–117)
ALT SERPL-CCNC: 30 U/L (ref 12–78)
ANION GAP SERPL CALC-SCNC: 3 MMOL/L (ref 5–15)
AST SERPL-CCNC: 21 U/L (ref 15–37)
BILIRUB SERPL-MCNC: 0.5 MG/DL (ref 0.2–1)
BUN SERPL-MCNC: 17 MG/DL (ref 6–20)
BUN/CREAT SERPL: 21 (ref 12–20)
CALCIUM SERPL-MCNC: 10 MG/DL (ref 8.5–10.1)
CHLORIDE SERPL-SCNC: 104 MMOL/L (ref 97–108)
CHOLEST SERPL-MCNC: 192 MG/DL
CK SERPL-CCNC: 99 U/L (ref 26–192)
CO2 SERPL-SCNC: 28 MMOL/L (ref 21–32)
CREAT SERPL-MCNC: 0.8 MG/DL (ref 0.55–1.02)
EST. AVERAGE GLUCOSE BLD GHB EST-MCNC: 111 MG/DL
GLOBULIN SER CALC-MCNC: 3.2 G/DL (ref 2–4)
GLUCOSE SERPL-MCNC: 97 MG/DL (ref 65–100)
HBA1C MFR BLD: 5.5 % (ref 4–5.6)
HDLC SERPL-MCNC: 94 MG/DL
HDLC SERPL: 2 {RATIO} (ref 0–5)
LDLC SERPL CALC-MCNC: 84.6 MG/DL (ref 0–100)
POTASSIUM SERPL-SCNC: 4.5 MMOL/L (ref 3.5–5.1)
PROT SERPL-MCNC: 7.3 G/DL (ref 6.4–8.2)
SODIUM SERPL-SCNC: 135 MMOL/L (ref 136–145)
TRIGL SERPL-MCNC: 67 MG/DL (ref ?–150)
VLDLC SERPL CALC-MCNC: 13.4 MG/DL

## 2021-10-29 PROCEDURE — G8510 SCR DEP NEG, NO PLAN REQD: HCPCS | Performed by: INTERNAL MEDICINE

## 2021-10-29 PROCEDURE — G8419 CALC BMI OUT NRM PARAM NOF/U: HCPCS | Performed by: INTERNAL MEDICINE

## 2021-10-29 PROCEDURE — G8536 NO DOC ELDER MAL SCRN: HCPCS | Performed by: INTERNAL MEDICINE

## 2021-10-29 PROCEDURE — G0439 PPPS, SUBSEQ VISIT: HCPCS | Performed by: INTERNAL MEDICINE

## 2021-10-29 PROCEDURE — 1090F PRES/ABSN URINE INCON ASSESS: CPT | Performed by: INTERNAL MEDICINE

## 2021-10-29 PROCEDURE — 1101F PT FALLS ASSESS-DOCD LE1/YR: CPT | Performed by: INTERNAL MEDICINE

## 2021-10-29 PROCEDURE — G8427 DOCREV CUR MEDS BY ELIG CLIN: HCPCS | Performed by: INTERNAL MEDICINE

## 2021-10-29 PROCEDURE — 99214 OFFICE O/P EST MOD 30 MIN: CPT | Performed by: INTERNAL MEDICINE

## 2021-10-29 PROCEDURE — G8399 PT W/DXA RESULTS DOCUMENT: HCPCS | Performed by: INTERNAL MEDICINE

## 2021-10-29 RX ORDER — CETIRIZINE HYDROCHLORIDE 10 MG/1
CAPSULE, LIQUID FILLED ORAL
COMMUNITY

## 2021-10-29 RX ORDER — LATANOPROST 50 UG/ML
SOLUTION/ DROPS OPHTHALMIC
COMMUNITY
Start: 2021-10-26

## 2021-10-29 NOTE — PROGRESS NOTES
1. Have you been to the ER, urgent care clinic since your last visit? Hospitalized since your last visit? No    2. Have you seen or consulted any other health care providers outside of the [address] [place] [address] since your last visit? Include any pap smears or colon screening.  No

## 2021-10-29 NOTE — PROGRESS NOTES
This is the Subsequent Medicare Annual Wellness Exam, performed 12 months or more after the Initial AWV or the last Subsequent AWV    I have reviewed the patient's medical history in detail and updated the computerized patient record. Assessment/Plan   Education and counseling provided:  Are appropriate based on today's review and evaluation    1. Medicare annual wellness visit, subsequent  2. IGT (impaired glucose tolerance)  -     HEMOGLOBIN A1C WITH EAG; Future  3. Osteopenia, unspecified location  4. Mixed hyperlipidemia  -     LIPID PANEL; Future  5. On statin therapy  -     LIPID PANEL; Future  -     METABOLIC PANEL, COMPREHENSIVE; Future  -     CK; Future  6. Vitamin D deficiency  -     VITAMIN D, 25 HYDROXY; Future  7. Gastroesophageal reflux disease without esophagitis  8. Frequency of micturition  9. Screening for diabetes mellitus  10. Screening for ischemic heart disease  11. Screening for depression  -     DEPRESSION SCREEN ANNUAL       Depression Risk Factor Screening     3 most recent PHQ Screens 10/29/2021   Little interest or pleasure in doing things Not at all   Feeling down, depressed, irritable, or hopeless Not at all   Total Score PHQ 2 0       Alcohol Risk Screen    Do you average more than 1 drink per night or more than 7 drinks a week:  No    On any one occasion in the past three months have you have had more than 3 drinks containing alcohol:  No        Functional Ability and Level of Safety    Hearing: Hearing is good. Activities of Daily Living: The home contains: no safety equipment. Patient does total self care      Ambulation: with no difficulty     Fall Risk:  Fall Risk Assessment, last 12 mths 10/14/2020   Able to walk? Yes   Fall in past 12 months? Yes   Number of falls in past 12 months 1   Fall with injury?  0      Abuse Screen:  Patient is not abused       Cognitive Screening    Has your family/caregiver stated any concerns about your memory: no     Cognitive Screening: Normal - Verbal Fluency Test    Health Maintenance Due     Health Maintenance Due   Topic Date Due    Flu Vaccine (1) 09/01/2021    COVID-19 Vaccine (3 - Pfizer booster) 09/26/2021       Patient Care Team   Patient Care Team:  Jossy Curry MD as PCP - General (Internal Medicine)  [facility] Curry [facility]  Jacques Sauceda MD as Physician (Cardiology)    History     Patient Active Problem List   Diagnosis Code    Abdominal pain R10.9    Gallstones K80.20    Hyperlipidemia E78.5    Osteopenia M85.80    Zoster B02.9    Melanoma ([address] C43.9    On statin therapy Z79.899    Vitamin D deficiency E55.9    IGT (impaired glucose tolerance) R73.02    IBS (irritable bowel syndrome) K58.9    Duodenal ulcer, perforated ([address] K26.5    Insomnia G47.00    Back pain M54.9    Pneumonia of right lung due to infectious organism J18.9    GERD (gastroesophageal reflux disease) K21.9     Past Medical History:   Diagnosis Date    Arthritis     hands    Cancer ([address]     basal cell of face, squamous, and melanoma    [facility]21[facility]2018    [facility]     Colitis, ischemic ([address] 9/26/2017    Duodenal ulcer, perforated ([address] 9/26/2017    Dyspepsia and other specified disorders of function of stomach     GERD (gastroesophageal reflux disease)     History of rectal bleeding 04/2018    Two episodes; first one 19 years ago - labeled as a \"possible ischemic attack\"    Hypercholesterolemia     Hyperlipidemia 9/26/2017    IBS (irritable bowel syndrome) 9/26/2017    IGT (impaired glucose tolerance) 09/26/2017    Borderline Hgb A1C    Insomnia 9/26/2017    Melanoma ([address] 9/26/2017    On statin therapy 9/26/2017    Osteopenia 9/26/2017    Pneumonia of right lung due to infectious organism 9/6/2019    PUD (peptic ulcer disease)     Temporal arteritis ([address] 9/26/2017    Vitamin D deficiency 9/26/2017    Zoster 9/26/2017      Past Surgical History: Procedure Laterality Date    COLONOSCOPY N/A 8/8/2018    COLONOSCOPY performed by [facility]cock [facility] at [facility]COPY    COLONOSCOPY,DIAGNOSTIC  3/17/2015         COLONOSCOPY,DIAGNOSTIC  8/8/2018         HX COLONOSCOPY      HX ENDOSCOPY      HX GYN  1984    hysterectomy    HX GYN  1980's    laparoscopy    HX TONSILLECTOMY      [place] ABDOMEN SURGERY PROC UNLISTED  1985    perforated ulcer    UPPER GI ENDOSCOPY,BIOPSY  8/8/2018          Current Outpatient Medications   Medication Sig Dispense Refill    latanoprost (XALATAN) 0.005 % ophthalmic solution       Cetirizine (ZyrTEC) 10 mg cap Take  by mouth.  tiZANidine (ZANAFLEX) 4 mg tablet Take 1 Tablet by mouth three (3) times daily as needed for Pain. 30 Tablet 0    cefUROXime (CEFTIN) 250 mg tablet Take 250 mg by mouth every seven (7) days.  cholecalciferol (VITAMIN D3) (5000 Units/125 mcg) tab tablet Take 5,000 Units by mouth daily.  pyridoxine, vitamin B6, (Vitamin B-6) 100 mg tablet Take 100 mg by mouth daily.  estradioL (ESTRACE) 0.01 % (0.1 mg/gram) vaginal cream PLEASE SEE ATTACHED FOR DETAILED DIRECTIONS      dicyclomine (BENTYL) 10 mg capsule Take 1 Cap by mouth four (4) times daily as needed for Abdominal Cramps. 120 Cap 0    simvastatin (ZOCOR) 20 mg tablet TAKE 1 TABLET AT BEDTIME 90 Tab 3    omeprazole (PRILOSEC) 20 mg capsule TAKE 1 CAPSULE EVERY DAY 90 Cap 3    B.infantis-B.ani-B.long-B.bifi (PROBIOTIC 4X) 10-15 mg TbEC Take  by mouth as needed.  multivit,iron,minerals/lutein (CENTRUM SILVER ULTRA WOMEN'S PO) Take  by mouth.  ascorbic acid, vitamin C, (VITAMIN C) 500 mg tablet Take  by mouth.  Biotin 2,500 mcg cap Take 5,000 mg by mouth daily.  melatonin tab tablet Take 10 mg by mouth nightly as needed.  CALCIUM CARBONATE/VITAMIN D3 (CALTRATE WITH VITAMIN D3 PO) Take 2 Tabs by mouth daily.  aspirin 81 mg tablet Take 81 mg by mouth.       cranberry 500 mg Cap Take 650 mg by mouth daily. Allergies   Allergen Reactions    Augmentin [Amoxicillin-Pot Clavulanate] Unknown (comments)     \"hard on my stomach\"    Tetracycline Unknown (comments)     \"funny feeling in my mouth. ..years ago\"       Family History   Problem Relation Age of Onset    Cancer Mother         uterine    Stroke Maternal Grandfather      Social History     Tobacco Use    Smoking status: Never Smoker    Smokeless tobacco: Never Used   Substance Use Topics    Alcohol use: Yes     Comment: rarely         Shahid Hawkins MD         Marco Abbott is a 67 y.o. female and presents with Follow-up (routine 6 month f/u)  . Subjective:  Mrs. Claudio Richard presents today for Medicare annual wellness review as well as 6-month follow-up of several medical problems including hyperlipidemia, osteopenia, monitoring statin therapy, vitamin D deficiency, impaired glucose tolerance, gastroesophageal reflux disease. She is doing well on her current medical regimen. She has no shortness of breath, chest pain, palpitations, PND, orthopnea, or pedal edema. She is planning on getting the Covid booster as soon as she can get scheduled to do this. She would like to wait and get her flu vaccine later in the season after she is completed her Covid vaccination.     Past Medical History:   Diagnosis Date    Arthritis     hands    Cancer ([facility]     basal cell of face, squamous, and melanoma    [facility] 04[facility]21/2018    [facility]     Colitis, ischemic [facility]) 9/26/2017    Duodenal ulcer, perforated ([facility] 9/26/2017    Dyspepsia and other specified disorders of function of stomach     GERD (gastroesophageal reflux disease)     History of rectal bleeding 04/2018    Two episodes; first one 19 years ago - labeled as a \"possible ischemic attack\"    Hypercholesterolemia     Hyperlipidemia 9/26/2017    IBS (irritable bowel syndrome) 9/26/2017    IGT (impaired glucose tolerance) 09/26/2017    Borderline Hgb A1C    Insomnia 9/26/2017  Melanoma [address] 9/26/2017    On statin therapy 9/26/2017    Osteopenia 9/26/2017    Pneumonia of right lung due to infectious organism 9/6/2019    PUD (peptic ulcer disease)     Temporal arteritis ([facility] [facility] 9/26/2017    Vitamin D deficiency 9/26/2017    Zoster 9/26/2017     Past Surgical History:   Procedure Laterality Date    COLONOSCOPY N/A 8/8/2018    [facility] at [facility]  3/17/2015         COLONOSCOPY,DIAGNOSTIC  8/8/2018         HX COLONOSCOPY      HX ENDOSCOPY      HX GYN  1984    hysterectomy    HX GYN  1980's    laparoscopy    HX TONSILLECTOMY      [place] ABDOMEN SURGERY PROC UNLISTED  1985    perforated ulcer    UPPER GI ENDOSCOPY,BIOPSY  8/8/2018          Allergies   Allergen Reactions    Augmentin [Amoxicillin-Pot Clavulanate] Unknown (comments)     \"hard on my stomach\"    Tetracycline Unknown (comments)     \"funny feeling in my mouth. ..years ago\"     Current Outpatient Medications   Medication Sig Dispense Refill    latanoprost (XALATAN) 0.005 % ophthalmic solution       Cetirizine (ZyrTEC) 10 mg cap Take  by mouth.  tiZANidine (ZANAFLEX) 4 mg tablet Take 1 Tablet by mouth three (3) times daily as needed for Pain. 30 Tablet 0    cefUROXime (CEFTIN) 250 mg tablet Take 250 mg by mouth every seven (7) days.  cholecalciferol (VITAMIN D3) (5000 Units/125 mcg) tab tablet Take 5,000 Units by mouth daily.  pyridoxine, vitamin B6, (Vitamin B-6) 100 mg tablet Take 100 mg by mouth daily.  estradioL (ESTRACE) 0.01 % (0.1 mg/gram) vaginal cream PLEASE SEE ATTACHED FOR DETAILED DIRECTIONS      dicyclomine (BENTYL) 10 mg capsule Take 1 Cap by mouth four (4) times daily as needed for Abdominal Cramps.  120 Cap 0    simvastatin (ZOCOR) 20 mg tablet TAKE 1 TABLET AT BEDTIME 90 Tab 3    omeprazole (PRILOSEC) 20 mg capsule TAKE 1 CAPSULE EVERY DAY 90 Cap 3    B.infantis-B.ani-B.long-B.bifi (PROBIOTIC 4X) 10-15 mg TbEC Take  by mouth as needed.  multivit,iron,minerals/lutein (CENTRUM SILVER ULTRA WOMEN'S PO) Take  by mouth.  ascorbic acid, vitamin C, (VITAMIN C) 500 mg tablet Take  by mouth.  Biotin 2,500 mcg cap Take 5,000 mg by mouth daily.  melatonin tab tablet Take 10 mg by mouth nightly as needed.  CALCIUM CARBONATE/VITAMIN D3 (CALTRATE WITH VITAMIN D3 PO) Take 2 Tabs by mouth daily.  aspirin 81 mg tablet Take 81 mg by mouth.  cranberry 500 mg Cap Take 650 mg by mouth daily. Social History     Socioeconomic History    Marital status:      Spouse name: Not on file    Number of children: Not on file    Years of education: Not on file    Highest education level: Not on file   Tobacco Use    Smoking status: Never Smoker    Smokeless tobacco: Never Used   Substance and Sexual Activity    Alcohol use: Yes     Comment: rarely    Drug use: No     Social Determinants of Health     Financial Resource Strain:     Difficulty of Paying Living Expenses:    Food Insecurity:     Worried About Running Out of Food in the Last Year:     [address] in the Last Year:    Transportation Needs:     Lack of Transportation (Medical):      Lack of Transportation (Non-Medical):    Physical Activity:     Days of Exercise per Week:     Minutes of Exercise per Session:    Stress:     Feeling of Stress :    Social Connections:     Frequency of Communication with Friends and Family:     Frequency of Social Gatherings with Friends and Family:     Attends [employer] Services:     Active Member of Clubs or Organizations:     Attends Club or Organization Meetings:     Marital Status:      Family History   Problem Relation Age of Onset    Cancer Mother         uterine    Stroke Maternal Grandfather        Review of Systems  Constitutional:  negative for fevers, chills, anorexia and weight loss  Eyes:    negative for visual disturbance and irritation  ENT:    negative for tinnitus,sore throat,nasal congestion,ear pains. hoarseness  Respiratory:     negative for cough, hemoptysis, dyspnea,wheezing  CV:    negative for chest pain, palpitations, lower extremity edema  GI:    negative for nausea, vomiting, diarrhea, abdominal pain,melena  Endo:               negative for polyuria,polydipsia,polyphagia,heat intolerance  Genitourinary : negative for frequency, dysuria and hematuria  Integumentary: negative for rash and pruritus  Hematologic:   negative for easy bruising and gum/nose bleeding  Musculoskel:  negative for myalgias, arthralgias, back pain, muscle weakness, joint pain  Neurological:   negative for headaches, dizziness, vertigo, memory problems and gait   Behavl/Psych:  negative for feelings of anxiety, depression, mood changes  ROS otherwise negative      Objective:  Visit Vitals  /78 (BP 1 Location: Left arm, BP Patient Position: Sitting, BP Cuff Size: Large adult)   Pulse 70   Resp 18   Ht 5' 4\" (1.626 m)   Wt 152 lb (68.9 kg)   SpO2 97%   BMI 26.09 kg/m²     Physical Exam:   General appearance - alert, well appearing, and in no distress  Mental status - alert, oriented to person, place, and time  EYE-JENNIFER, EOMI, fundi normal, corneas normal, no foreign bodies  ENT-ENT exam normal, no neck nodes or sinus tenderness  Nose - normal and patent, no erythema, discharge or polyps  Mouth - mucous membranes moist, pharynx normal without lesions  Neck - supple, no significant adenopathy   Chest - clear to auscultation, no wheezes, rales or rhonchi, symmetric air entry   Heart - normal rate, regular rhythm, normal S1, S2, no murmurs, rubs, clicks or gallops   Abdomen - soft, nontender, nondistended, no masses or organomegaly  Lymph- no adenopathy palpable  Ext-peripheral pulses normal, no pedal edema, no clubbing or cyanosis  Skin-Warm and dry.  no hyperpigmentation, vitiligo, or suspicious lesions  Neuro -alert, oriented, normal speech, no focal findings or movement disorder noted      Assessment/Plan:  Diagnoses and all orders for this visit:    1. Medicare annual wellness visit, subsequent    2. IGT (impaired glucose tolerance)  -     HEMOGLOBIN A1C WITH EAG; Future    3. Osteopenia, unspecified location    4. Mixed hyperlipidemia  -     LIPID PANEL; Future    5. On statin therapy  -     LIPID PANEL; Future  -     METABOLIC PANEL, COMPREHENSIVE; Future  -     CK; Future    6. Vitamin D deficiency  -     VITAMIN D, 25 HYDROXY; Future    7. Gastroesophageal reflux disease without esophagitis    8. Frequency of micturition    9. Screening for diabetes mellitus    10. Screening for ischemic heart disease    11. Screening for depression  -     Jolanta Berrios          ICD-10-CM ICD-9-CM    1. Medicare annual wellness visit, subsequent  Z00.00 V70.0    2. IGT (impaired glucose tolerance)  R73.02 790.22 HEMOGLOBIN A1C WITH EAG      HEMOGLOBIN A1C WITH EAG   3. Osteopenia, unspecified location  M85.80 733.90    4. Mixed hyperlipidemia  E78.2 272.2 LIPID PANEL      LIPID PANEL   5. On statin therapy  Z79.899 V58.69 LIPID PANEL      METABOLIC PANEL, COMPREHENSIVE      CK      CK      METABOLIC PANEL, COMPREHENSIVE      LIPID PANEL   6. Vitamin D deficiency  E55.9 268.9 VITAMIN D, 25 HYDROXY      VITAMIN D, 25 HYDROXY   7. Gastroesophageal reflux disease without esophagitis  K21.9 530.81    8. Frequency of micturition  R35.0 788.41    9. Screening for diabetes mellitus  Z13.1 V77.1    10. Screening for ischemic heart disease  Z13.6 V81.0    11. Screening for depression  Z13.31 V79.0 DEPRESSION SCREEN ANNUAL       Plan:    Continue current medical regimen as outlined above. Further recommendations based on labs as ordered. I have reviewed with the patient details of the assessment and plan and all questions were answered. Relevent patient education was performed. Verbal and/or written instructions (see AVS) provided. The most recent lab findings were reviewed with the patient. Plan was discussed with patient who verbally expressed understanding. An After Visit Summary was printed and given to the patient.     Ta Mendoza MD

## 2021-10-29 NOTE — PATIENT INSTRUCTIONS
Medicare Wellness Visit, Female     The best way to live healthy is to have a lifestyle where you eat a well-balanced diet, exercise regularly, limit alcohol use, and quit all forms of tobacco/nicotine, if applicable. Regular preventive services are another way to keep healthy. Preventive services (vaccines, screening tests, monitoring & exams) can help personalize your care plan, which helps you manage your own care. Screening tests can find health problems at the earliest stages, when they are easiest to treat. [facility] follows the current, evidence-based guidelines published by the [facility] Leroy Murphy ([facility]) when recommending preventive services for our patients. Because we follow these guidelines, sometimes recommendations change over time as research supports it. (For example, mammograms used to be recommended annually. Even though Medicare will still pay for an annual mammogram, the newer guidelines recommend a mammogram every two years for women of average risk). Of course, you and your doctor may decide to screen more often for some diseases, based on your risk and your co-morbidities (chronic disease you are already diagnosed with). Preventive services for you include:  - Medicare offers their members a free annual wellness visit, which is time for you and your primary care provider to discuss and plan for your preventive service needs. Take advantage of this benefit every year!  -All adults over the age of 72 should receive the recommended pneumonia vaccines. Current USPSTF guidelines recommend a series of two vaccines for the best pneumonia protection.   -All adults should have a flu vaccine yearly and a tetanus vaccine every 10 years.   -All adults age 48 and older should receive the shingles vaccines (series of two vaccines).       -All adults age 38-68 who are overweight should have a diabetes screening test once every three years.   -All adults born between [birth year] and 1966 should be screened once for Hepatitis C.  -Other screening tests and preventive services for persons with diabetes include: an eye exam to screen for diabetic retinopathy, a kidney function test, a foot exam, and stricter control over your cholesterol.   -Cardiovascular screening for adults with routine risk involves an electrocardiogram (ECG) at intervals determined by your doctor.   -Colorectal cancer screenings should be done for adults age 54-65 with no increased risk factors for colorectal cancer. There are a number of acceptable methods of screening for this type of cancer. Each test has its own benefits and drawbacks. Discuss with your doctor what is most appropriate for you during your annual wellness visit. The different tests include: colonoscopy (considered the best screening method), a fecal occult blood test, a fecal DNA test, and sigmoidoscopy.    -A bone mass density test is recommended when a woman turns 65 to screen for osteoporosis. This test is only recommended one time, as a screening. Some providers will use this same test as a disease monitoring tool if you already have osteoporosis. -Breast cancer screenings are recommended every other year for women of normal risk, age 54-69.  -Cervical cancer screenings for women over age 72 are only recommended with certain risk factors.      Here is a list of your current Health Maintenance items (your personalized list of preventive services) with a due date:  Health Maintenance Due   Topic Date Due    Yearly Flu Vaccine (1) 09/01/2021    COVID-19 Vaccine (3 - Pfizer booster) 09/26/2021

## 2021-11-01 ENCOUNTER — LAB ONLY (OUTPATIENT)
Dept: INTERNAL MEDICINE CLINIC | Age: 76
End: 2021-11-01

## 2021-11-01 ENCOUNTER — TELEPHONE (OUTPATIENT)
Dept: INTERNAL MEDICINE CLINIC | Age: 76
End: 2021-11-01

## 2021-11-01 DIAGNOSIS — R35.0 FREQUENCY OF URINATION: Primary | ICD-10-CM

## 2021-11-01 LAB
APPEARANCE UR: CLEAR
BILIRUB UR QL: NEGATIVE
COLOR UR: NORMAL
GLUCOSE UR STRIP.AUTO-MCNC: NEGATIVE MG/DL
HGB UR QL STRIP: NEGATIVE
KETONES UR QL STRIP.AUTO: NEGATIVE MG/DL
LEUKOCYTE ESTERASE UR QL STRIP.AUTO: NEGATIVE
NITRITE UR QL STRIP.AUTO: NEGATIVE
PH UR STRIP: 5.5 [PH] (ref 5–8)
PROT UR STRIP-MCNC: NEGATIVE MG/DL
SP GR UR REFRACTOMETRY: <1.005 (ref 1–1.03)
UROBILINOGEN UR QL STRIP.AUTO: 0.2 EU/DL (ref 0.2–1)

## 2021-11-01 NOTE — TELEPHONE ENCOUNTER
Patient called complaining of urinary frequency and would like to have a urinalysis done.   Patient scheduled for a lab appointment today at 3:30 pm.

## 2021-11-02 LAB
BACTERIA SPEC CULT: NORMAL
SERVICE CMNT-IMP: NORMAL

## 2021-11-18 ENCOUNTER — TELEPHONE (OUTPATIENT)
Dept: INTERNAL MEDICINE CLINIC | Age: 76
End: 2021-11-18

## 2022-02-07 NOTE — TELEPHONE ENCOUNTER
Patient advised of lab results.   Letter mailed to her [place] address: [address], [address]

## 2022-03-18 PROBLEM — J18.9 PNEUMONIA OF RIGHT LUNG DUE TO INFECTIOUS ORGANISM: Status: ACTIVE | Noted: 2019-09-06

## 2022-03-18 PROBLEM — M85.80 OSTEOPENIA: Status: ACTIVE | Noted: 2017-09-26

## 2022-03-18 PROBLEM — B02.9 ZOSTER: Status: ACTIVE | Noted: 2017-09-26

## 2022-03-19 PROBLEM — G47.00 INSOMNIA: Status: ACTIVE | Noted: 2017-09-26

## 2022-03-19 PROBLEM — E55.9 VITAMIN D DEFICIENCY: Status: ACTIVE | Noted: 2017-09-26

## 2022-03-19 PROBLEM — R73.02 IGT (IMPAIRED GLUCOSE TOLERANCE): Status: ACTIVE | Noted: 2017-09-26

## 2022-03-19 PROBLEM — Z79.899 ON STATIN THERAPY: Status: ACTIVE | Noted: 2017-09-26

## 2022-03-19 PROBLEM — K26.5 DUODENAL ULCER, PERFORATED (HCC): Status: ACTIVE | Noted: 2017-09-26

## 2022-03-19 PROBLEM — C43.9 MELANOMA (HCC): Status: ACTIVE | Noted: 2017-09-26

## 2022-03-19 PROBLEM — K58.9 IBS (IRRITABLE BOWEL SYNDROME): Status: ACTIVE | Noted: 2017-09-26

## 2022-03-19 PROBLEM — M54.9 BACK PAIN: Status: ACTIVE | Noted: 2017-09-26

## 2022-03-20 PROBLEM — E78.5 HYPERLIPIDEMIA: Status: ACTIVE | Noted: 2017-09-26

## 2022-04-14 RX ORDER — SIMVASTATIN 20 MG/1
TABLET, FILM COATED ORAL
Qty: 90 TABLET | Refills: 3 | Status: SHIPPED | OUTPATIENT
Start: 2022-04-14

## 2022-04-14 RX ORDER — OMEPRAZOLE 20 MG/1
CAPSULE, DELAYED RELEASE ORAL
Qty: 90 CAPSULE | Refills: 3 | Status: SHIPPED | OUTPATIENT
Start: 2022-04-14

## 2022-04-14 NOTE — TELEPHONE ENCOUNTER
Last Refill: 4/14/21  Last Visit: 10/29/2021   Next Visit: 4/29/2022    Requested Prescriptions     Pending Prescriptions Disp Refills    simvastatin (ZOCOR) 20 mg tablet 90 Tablet 3     Sig: TAKE 1 TABLET AT BEDTIME    omeprazole (PRILOSEC) 20 mg capsule 90 Capsule 3     Sig: TAKE 1 CAPSULE EVERY DAY

## 2022-05-02 ENCOUNTER — OFFICE VISIT (OUTPATIENT)
Dept: INTERNAL MEDICINE CLINIC | Age: 77
End: 2022-05-02
Payer: MEDICARE

## 2022-05-02 VITALS
BODY MASS INDEX: 27.21 KG/M2 | OXYGEN SATURATION: 98 % | RESPIRATION RATE: 20 BRPM | WEIGHT: 159.4 LBS | HEIGHT: 64 IN | TEMPERATURE: 98.1 F | HEART RATE: 62 BPM | DIASTOLIC BLOOD PRESSURE: 78 MMHG | SYSTOLIC BLOOD PRESSURE: 118 MMHG

## 2022-05-02 DIAGNOSIS — Z79.899 ON STATIN THERAPY: ICD-10-CM

## 2022-05-02 DIAGNOSIS — C43.9 MALIGNANT MELANOMA, UNSPECIFIED SITE (HCC): ICD-10-CM

## 2022-05-02 DIAGNOSIS — E78.2 MIXED HYPERLIPIDEMIA: ICD-10-CM

## 2022-05-02 DIAGNOSIS — K21.9 GASTROESOPHAGEAL REFLUX DISEASE WITHOUT ESOPHAGITIS: ICD-10-CM

## 2022-05-02 DIAGNOSIS — R73.02 IGT (IMPAIRED GLUCOSE TOLERANCE): Primary | ICD-10-CM

## 2022-05-02 DIAGNOSIS — R35.0 FREQUENCY OF MICTURITION: ICD-10-CM

## 2022-05-02 DIAGNOSIS — E55.9 VITAMIN D DEFICIENCY: ICD-10-CM

## 2022-05-02 DIAGNOSIS — R53.83 FATIGUE, UNSPECIFIED TYPE: ICD-10-CM

## 2022-05-02 DIAGNOSIS — M85.80 OSTEOPENIA, UNSPECIFIED LOCATION: ICD-10-CM

## 2022-05-02 PROBLEM — J18.9 PNEUMONIA OF RIGHT LUNG DUE TO INFECTIOUS ORGANISM: Status: RESOLVED | Noted: 2019-09-06 | Resolved: 2022-05-02

## 2022-05-02 LAB
25(OH)D3 SERPL-MCNC: 38.7 NG/ML (ref 30–100)
ALBUMIN SERPL-MCNC: 4.2 G/DL (ref 3.5–5)
ALBUMIN/GLOB SERPL: 1.4 {RATIO} (ref 1.1–2.2)
ALP SERPL-CCNC: 113 U/L (ref 45–117)
ALT SERPL-CCNC: 29 U/L (ref 12–78)
ANION GAP SERPL CALC-SCNC: 8 MMOL/L (ref 5–15)
APPEARANCE UR: CLEAR
AST SERPL-CCNC: 26 U/L (ref 15–37)
BACTERIA URNS QL MICRO: NEGATIVE /HPF
BASOPHILS # BLD: 0 K/UL (ref 0–0.1)
BASOPHILS NFR BLD: 1 % (ref 0–1)
BILIRUB SERPL-MCNC: 0.5 MG/DL (ref 0.2–1)
BILIRUB UR QL: NEGATIVE
BUN SERPL-MCNC: 15 MG/DL (ref 6–20)
BUN/CREAT SERPL: 19 (ref 12–20)
CALCIUM SERPL-MCNC: 9.5 MG/DL (ref 8.5–10.1)
CHLORIDE SERPL-SCNC: 102 MMOL/L (ref 97–108)
CHOLEST SERPL-MCNC: 172 MG/DL
CK SERPL-CCNC: 97 U/L (ref 26–192)
CO2 SERPL-SCNC: 27 MMOL/L (ref 21–32)
COLOR UR: ABNORMAL
CREAT SERPL-MCNC: 0.77 MG/DL (ref 0.55–1.02)
DIFFERENTIAL METHOD BLD: ABNORMAL
EOSINOPHIL # BLD: 0.1 K/UL (ref 0–0.4)
EOSINOPHIL NFR BLD: 2 % (ref 0–7)
EPITH CASTS URNS QL MICRO: ABNORMAL /LPF
ERYTHROCYTE [DISTWIDTH] IN BLOOD BY AUTOMATED COUNT: 14.6 % (ref 11.5–14.5)
EST. AVERAGE GLUCOSE BLD GHB EST-MCNC: 117 MG/DL
GLOBULIN SER CALC-MCNC: 3 G/DL (ref 2–4)
GLUCOSE SERPL-MCNC: 94 MG/DL (ref 65–100)
GLUCOSE UR STRIP.AUTO-MCNC: NEGATIVE MG/DL
HBA1C MFR BLD: 5.7 % (ref 4–5.6)
HCT VFR BLD AUTO: 43.8 % (ref 35–47)
HDLC SERPL-MCNC: 90 MG/DL
HDLC SERPL: 1.9 {RATIO} (ref 0–5)
HGB BLD-MCNC: 13.9 G/DL (ref 11.5–16)
HGB UR QL STRIP: NEGATIVE
IMM GRANULOCYTES # BLD AUTO: 0 K/UL (ref 0–0.04)
IMM GRANULOCYTES NFR BLD AUTO: 0 % (ref 0–0.5)
KETONES UR QL STRIP.AUTO: NEGATIVE MG/DL
LDLC SERPL CALC-MCNC: 67.2 MG/DL (ref 0–100)
LEUKOCYTE ESTERASE UR QL STRIP.AUTO: ABNORMAL
LYMPHOCYTES # BLD: 1.8 K/UL (ref 0.8–3.5)
LYMPHOCYTES NFR BLD: 29 % (ref 12–49)
MCH RBC QN AUTO: 30.8 PG (ref 26–34)
MCHC RBC AUTO-ENTMCNC: 31.7 G/DL (ref 30–36.5)
MCV RBC AUTO: 97.1 FL (ref 80–99)
MONOCYTES # BLD: 0.5 K/UL (ref 0–1)
MONOCYTES NFR BLD: 8 % (ref 5–13)
NEUTS SEG # BLD: 3.7 K/UL (ref 1.8–8)
NEUTS SEG NFR BLD: 60 % (ref 32–75)
NITRITE UR QL STRIP.AUTO: NEGATIVE
NRBC # BLD: 0 K/UL (ref 0–0.01)
NRBC BLD-RTO: 0 PER 100 WBC
PH UR STRIP: 6.5 [PH] (ref 5–8)
PLATELET # BLD AUTO: 249 K/UL (ref 150–400)
PMV BLD AUTO: 11 FL (ref 8.9–12.9)
POTASSIUM SERPL-SCNC: 4.7 MMOL/L (ref 3.5–5.1)
PROT SERPL-MCNC: 7.2 G/DL (ref 6.4–8.2)
PROT UR STRIP-MCNC: NEGATIVE MG/DL
RBC # BLD AUTO: 4.51 M/UL (ref 3.8–5.2)
RBC #/AREA URNS HPF: ABNORMAL /HPF (ref 0–5)
SODIUM SERPL-SCNC: 137 MMOL/L (ref 136–145)
SP GR UR REFRACTOMETRY: 1.01 (ref 1–1.03)
TRIGL SERPL-MCNC: 74 MG/DL (ref ?–150)
UR CULT HOLD, URHOLD: NORMAL
UROBILINOGEN UR QL STRIP.AUTO: 0.2 EU/DL (ref 0.2–1)
VLDLC SERPL CALC-MCNC: 14.8 MG/DL
WBC # BLD AUTO: 6.1 K/UL (ref 3.6–11)
WBC URNS QL MICRO: ABNORMAL /HPF (ref 0–4)

## 2022-05-02 PROCEDURE — G8427 DOCREV CUR MEDS BY ELIG CLIN: HCPCS | Performed by: INTERNAL MEDICINE

## 2022-05-02 PROCEDURE — G8536 NO DOC ELDER MAL SCRN: HCPCS | Performed by: INTERNAL MEDICINE

## 2022-05-02 PROCEDURE — 99214 OFFICE O/P EST MOD 30 MIN: CPT | Performed by: INTERNAL MEDICINE

## 2022-05-02 PROCEDURE — 1101F PT FALLS ASSESS-DOCD LE1/YR: CPT | Performed by: INTERNAL MEDICINE

## 2022-05-02 PROCEDURE — G8432 DEP SCR NOT DOC, RNG: HCPCS | Performed by: INTERNAL MEDICINE

## 2022-05-02 PROCEDURE — G8399 PT W/DXA RESULTS DOCUMENT: HCPCS | Performed by: INTERNAL MEDICINE

## 2022-05-02 PROCEDURE — G8419 CALC BMI OUT NRM PARAM NOF/U: HCPCS | Performed by: INTERNAL MEDICINE

## 2022-05-02 PROCEDURE — 1090F PRES/ABSN URINE INCON ASSESS: CPT | Performed by: INTERNAL MEDICINE

## 2022-05-02 NOTE — PROGRESS NOTES
Jennifer Hernandez is a 67 y.o. female and presents with Follow Up Chronic Condition (6 mo fu)  . Subjective:  Mrs. Alexandra Sexton presents today for follow-up of several problems including impaired glucose tolerance, hyperlipidemia, monitoring of statin therapy, osteopenia, GERD, seasonal allergic rhinitis, and history of colitis. She is doing well on her current medical regimen. She denies any side effects from her current medications. She has no shortness of breath, chest pain, palpitations, PND, orthopnea, or pedal edema. She continues to see her dermatologist regularly for a history of melanoma as well as basal cell carcinoma.     Past Medical History:   Diagnosis Date    Arthritis     hands    Cancer ([address]     basal cell of face, squamous, and melanoma    Colitis 04/21/2018    [facility] ER     Colitis, ischemic [facility]) 9/26/2017    Duodenal ulcer, perforated ([facility] 9/26/2017    Dyspepsia and other specified disorders of function of stomach     GERD (gastroesophageal reflux disease)     History of rectal bleeding 04/2018    Two episodes; first one 19 years ago - labeled as a \"possible ischemic attack\"    Hypercholesterolemia     Hyperlipidemia 9/26/2017    IBS (irritable bowel syndrome) 9/26/2017    IGT (impaired glucose tolerance) 09/26/2017    Borderline Hgb A1C    Insomnia 9/26/2017    Melanoma ([facility] 9/26/2017    On statin therapy 9/26/2017    Osteopenia 9/26/2017    Pneumonia of right lung due to infectious organism 9/6/2019    PUD (peptic ulcer disease)     Temporal arteritis ([facility] 9/26/2017    Vitamin D deficiency 9/26/2017    Zoster 9/26/2017     Past Surgical History:   Procedure Laterality Date    COLONOSCOPY N/A 8/8/2018    COLONOSCOPY performed by Glenn Weaver MD at [address]  3/17/2015         COLONOSCOPY,DIAGNOSTIC  8/8/2018         HX COLONOSCOPY      HX ENDOSCOPY      HX GYN  1984    hysterectomy    HX GYN  [id number] laparoscopy    HX TONSILLECTOMY      NV ABDOMEN SURGERY PROC UNLISTED  1985    perforated ulcer    UPPER GI ENDOSCOPY,BIOPSY  8/8/2018          Allergies   Allergen Reactions    Augmentin [Amoxicillin-Pot Clavulanate] Unknown (comments)     \"hard on my stomach\"    Tetracycline Unknown (comments)     \"funny feeling in my mouth. ..years ago\"     Current Outpatient Medications   Medication Sig Dispense Refill    simvastatin (ZOCOR) 20 mg tablet TAKE 1 TABLET AT BEDTIME 90 Tablet 3    omeprazole (PRILOSEC) 20 mg capsule TAKE 1 CAPSULE EVERY DAY 90 Capsule 3    latanoprost (XALATAN) 0.005 % ophthalmic solution       Cetirizine (ZyrTEC) 10 mg cap Take  by mouth.  tiZANidine (ZANAFLEX) 4 mg tablet Take 1 Tablet by mouth three (3) times daily as needed for Pain. 30 Tablet 0    cefUROXime (CEFTIN) 250 mg tablet Take 250 mg by mouth every seven (7) days.  cholecalciferol (VITAMIN D3) (5000 Units/125 mcg) tab tablet Take 5,000 Units by mouth daily.  pyridoxine, vitamin B6, (Vitamin B-6) 100 mg tablet Take 100 mg by mouth daily.  estradioL (ESTRACE) 0.01 % (0.1 mg/gram) vaginal cream PLEASE SEE ATTACHED FOR DETAILED DIRECTIONS      dicyclomine (BENTYL) 10 mg capsule Take 1 Cap by mouth four (4) times daily as needed for Abdominal Cramps. 120 Cap 0    B.infantis-B.ani-B.long-B.bifi (PROBIOTIC 4X) 10-15 mg TbEC Take  by mouth as needed.  multivit,iron,minerals/lutein (CENTRUM SILVER ULTRA WOMEN'S PO) Take  by mouth.  ascorbic acid, vitamin C, (VITAMIN C) 500 mg tablet Take  by mouth.  Biotin 2,500 mcg cap Take 5,000 mg by mouth daily.  melatonin tab tablet Take 10 mg by mouth nightly as needed.  CALCIUM CARBONATE/VITAMIN D3 (CALTRATE WITH VITAMIN D3 PO) Take 2 Tabs by mouth daily.  aspirin 81 mg tablet Take 81 mg by mouth.  cranberry 500 mg Cap Take 650 mg by mouth daily.        Social History     Socioeconomic History    Marital status:    Tobacco Use    Smoking status: Never Smoker    Smokeless tobacco: Never Used   Vaping Use    Vaping Use: Never used   Substance and Sexual Activity    Alcohol use: Yes     Comment: rarely    Drug use: No     Family History   Problem Relation Age of Onset    Cancer Mother         uterine    Stroke Maternal Grandfather        Review of Systems  Constitutional:  negative for fevers, chills, anorexia and weight loss  Eyes:    negative for visual disturbance and irritation  ENT:    negative for tinnitus,sore throat,nasal congestion,ear pains. hoarseness  Respiratory:     negative for cough, hemoptysis, dyspnea,wheezing  CV:    negative for chest pain, palpitations, lower extremity edema  GI:    negative for nausea, vomiting, diarrhea, abdominal pain,melena  Endo:               negative for polyuria,polydipsia,polyphagia,heat intolerance  Genitourinary : negative for frequency, dysuria and hematuria  Integumentary: negative for rash and pruritus  Hematologic:   negative for easy bruising and gum/nose bleeding  Musculoskel:  negative for myalgias, arthralgias, back pain, muscle weakness, joint pain  Neurological:   negative for headaches, dizziness, vertigo, memory problems and gait   Behavl/Psych:  negative for feelings of anxiety, depression, mood changes  ROS otherwise negative      Objective:  Visit Vitals  /78 (BP 1 Location: Left upper arm, BP Patient Position: Sitting, BP Cuff Size: Adult)   Pulse 62   Temp 98.1 °F (36.7 °C) (Oral)   Resp 20   Ht 5' 4\" (1.626 m)   Wt 159 lb 6.4 oz (72.3 kg)   SpO2 98%   BMI 27.36 kg/m²     Physical Exam:   General appearance - alert, well appearing, and in no distress  Mental status - alert, oriented to person, place, and time  EYE-JENNIFER, EOMI, fundi normal, corneas normal, no foreign bodies  ENT-ENT exam normal, no neck nodes or sinus tenderness  Nose - normal and patent, no erythema, discharge or polyps  Mouth - mucous membranes moist, pharynx normal without lesions  Neck - supple, no significant adenopathy   Chest - clear to auscultation, no wheezes, rales or rhonchi, symmetric air entry   Heart - normal rate, regular rhythm, normal S1, S2, no murmurs, rubs, clicks or gallops   Abdomen - soft, nontender, nondistended, no masses or organomegaly  Lymph- no adenopathy palpable  Ext-peripheral pulses normal, no pedal edema, no clubbing or cyanosis  Skin-Warm and dry. no hyperpigmentation, vitiligo, or suspicious lesions  Neuro -alert, oriented, normal speech, no focal findings or movement disorder noted      Assessment/Plan:  Diagnoses and all orders for this visit:    1. IGT (impaired glucose tolerance)  -     HEMOGLOBIN A1C WITH EAG; Future    2. Malignant melanoma, unspecified site ([facility]    3. On statin therapy  -     CK; Future  -     LIPID PANEL; Future  -     METABOLIC PANEL, COMPREHENSIVE; Future    4. Vitamin D deficiency  -     VITAMIN D, 25 HYDROXY; Future    5. Mixed hyperlipidemia  -     CK; Future  -     LIPID PANEL; Future  -     METABOLIC PANEL, COMPREHENSIVE; Future    6. Osteopenia, unspecified location    7. Gastroesophageal reflux disease without esophagitis    8. Frequency of micturition  -     URINALYSIS W/ RFLX MICROSCOPIC; Future    9. Fatigue, unspecified type  -     CBC WITH AUTOMATED DIFF; Future          ICD-10-CM ICD-9-CM    1. IGT (impaired glucose tolerance)  R73.02 790.22 HEMOGLOBIN A1C WITH EAG      HEMOGLOBIN A1C WITH EAG   2. Malignant melanoma, unspecified site ([facility]  C43.9 172.9    3. On statin therapy  Z79.899 V58.69 CK      LIPID PANEL      METABOLIC PANEL, COMPREHENSIVE      CK      LIPID PANEL      METABOLIC PANEL, COMPREHENSIVE   4. Vitamin D deficiency  E55.9 268.9 VITAMIN D, 25 HYDROXY      VITAMIN D, 25 HYDROXY   5. Mixed hyperlipidemia  E78.2 272.2 CK      LIPID PANEL      METABOLIC PANEL, COMPREHENSIVE      CK      LIPID PANEL      METABOLIC PANEL, COMPREHENSIVE   6. Osteopenia, unspecified location  M85.80 733.90    7.  Gastroesophageal reflux disease without esophagitis  K21.9 530.81    8. Frequency of micturition  R35.0 788.41 URINALYSIS W/ RFLX MICROSCOPIC      URINALYSIS W/ RFLX MICROSCOPIC   9. Fatigue, unspecified type  R53.83 780.79 CBC WITH AUTOMATED DIFF      CBC WITH AUTOMATED DIFF     Plan:    Continue current medical regimen as outlined above. Further recommendations based on labs as ordered. Follow-up and Dispositions    · Return in about 6 months (around 11/2/2022) for follow up. I have reviewed with the patient details of the assessment and plan and all questions were answered. Relevent patient education was performed. Verbal and/or written instructions (see AVS) provided. The most recent lab findings were reviewed with the patient. Plan was discussed with patient who verbally expressed understanding. An After Visit Summary was printed and given to the patient.     Romero Doe MD

## 2022-05-02 NOTE — PROGRESS NOTES
Jaime Duenas is a 67 y.o. female presenting for Follow Up Chronic Condition (6 mo fu)  . 1. Have you been to the ER, urgent care clinic since your last visit? Hospitalized since your last visit? No    2. Have you seen or consulted any other health care providers outside of the [address] [place] [address] since your last visit? Include any pap smears or colon screening. No    Fall Risk Assessment, last 12 mths 10/14/2020   Able to walk? Yes   Fall in past 12 months? Yes   Number of falls in past 12 months 1   Fall with injury? 0         Abuse Screening Questionnaire 10/14/2020   Do you ever feel afraid of your partner? N   Are you in a relationship with someone who physically or mentally threatens you? N   Is it safe for you to go home? Y       3 most recent PHQ Screens 10/29/2021   Little interest or pleasure in doing things Not at all   Feeling down, depressed, irritable, or hopeless Not at all   Total Score PHQ 2 0       There are no discontinued medications.

## 2022-07-27 ENCOUNTER — OFFICE VISIT (OUTPATIENT)
Dept: INTERNAL MEDICINE CLINIC | Age: 77
End: 2022-07-27
Payer: MEDICARE

## 2022-07-27 VITALS
HEIGHT: 64 IN | SYSTOLIC BLOOD PRESSURE: 122 MMHG | WEIGHT: 159 LBS | BODY MASS INDEX: 27.14 KG/M2 | TEMPERATURE: 98.3 F | HEART RATE: 93 BPM | DIASTOLIC BLOOD PRESSURE: 74 MMHG | RESPIRATION RATE: 16 BRPM | OXYGEN SATURATION: 95 %

## 2022-07-27 DIAGNOSIS — R05.9 COUGH: Primary | ICD-10-CM

## 2022-07-27 PROCEDURE — G8536 NO DOC ELDER MAL SCRN: HCPCS | Performed by: INTERNAL MEDICINE

## 2022-07-27 PROCEDURE — G8399 PT W/DXA RESULTS DOCUMENT: HCPCS | Performed by: INTERNAL MEDICINE

## 2022-07-27 PROCEDURE — 1090F PRES/ABSN URINE INCON ASSESS: CPT | Performed by: INTERNAL MEDICINE

## 2022-07-27 PROCEDURE — 99213 OFFICE O/P EST LOW 20 MIN: CPT | Performed by: INTERNAL MEDICINE

## 2022-07-27 PROCEDURE — G8419 CALC BMI OUT NRM PARAM NOF/U: HCPCS | Performed by: INTERNAL MEDICINE

## 2022-07-27 PROCEDURE — 1101F PT FALLS ASSESS-DOCD LE1/YR: CPT | Performed by: INTERNAL MEDICINE

## 2022-07-27 PROCEDURE — 1123F ACP DISCUSS/DSCN MKR DOCD: CPT | Performed by: INTERNAL MEDICINE

## 2022-07-27 PROCEDURE — G8432 DEP SCR NOT DOC, RNG: HCPCS | Performed by: INTERNAL MEDICINE

## 2022-07-27 PROCEDURE — G8427 DOCREV CUR MEDS BY ELIG CLIN: HCPCS | Performed by: INTERNAL MEDICINE

## 2022-07-27 RX ORDER — IPRATROPIUM BROMIDE 42 UG/1
2 SPRAY, METERED NASAL
Qty: 15 ML | Refills: 0 | Status: SHIPPED | OUTPATIENT
Start: 2022-07-27

## 2022-07-27 NOTE — PROGRESS NOTES
Alfredito Polanco is a 65 y.o. female presenting for Cough  . 1. Have you been to the ER, urgent care clinic since your last visit? Hospitalized since your last visit? No    2. Have you seen or consulted any other health care providers outside of the [address] [place] [address] since your last visit? Include any pap smears or colon screening. No    Fall Risk Assessment, last 12 mths 10/14/2020   Able to walk? Yes   Fall in past 12 months? Yes   Number of falls in past 12 months 1   Fall with injury? 0         Abuse Screening Questionnaire 10/14/2020   Do you ever feel afraid of your partner? N   Are you in a relationship with someone who physically or mentally threatens you? N   Is it safe for you to go home? Y       3 most recent PHQ Screens 10/29/2021   Little interest or pleasure in doing things Not at all   Feeling down, depressed, irritable, or hopeless Not at all   Total Score PHQ 2 0       There are no discontinued medications.

## 2022-07-27 NOTE — PROGRESS NOTES
Beni Elena is a 65 y.o. female and presents with Cough  . Subjective:  Mrs. Rogelio Stoner presents today with complaint of a cough. She was diagnosed with COVID several weeks ago. She treated her symptoms symptomatically and was improving. [facility] given a prescription for azithromycin because of some drainage of yellowish-green color. However she continued to use her Opal pot and her sinus drainage eventually cleared. She had a slight headache and some aches but this has all subsided. The reason she presents today is because she still has a slight cough or sensation of something in the back of her throat. She has no difficulty swallowing or drinking. She has no loss of sense of taste or smell. In addition to a Opal pot she has been using Zyrtec daily. She seems to be constantly clearing her throat. She does have a history of reflux and remains on omeprazole for this regularly. She does not have any heartburn symptoms.     Past Medical History:   Diagnosis Date    Arthritis     hands    Cancer ([address]     basal cell of face, squamous, and melanoma    Colitis 04/21/2018    [facility] ER     Colitis, ischemic [facility]) 9/26/2017    Duodenal ulcer, perforated ([address] 9/26/2017    Dyspepsia and other specified disorders of function of stomach     GERD (gastroesophageal reflux disease)     History of rectal bleeding 04/2018    Two episodes; first one 19 years ago - labeled as a \"possible ischemic attack\"    Hypercholesterolemia     Hyperlipidemia 9/26/2017    IBS (irritable bowel syndrome) 9/26/2017    IGT (impaired glucose tolerance) 09/26/2017    Borderline Hgb A1C    Insomnia 9/26/2017    Melanoma ([address] 9/26/2017    On statin therapy 9/26/2017    Osteopenia 9/26/2017    Pneumonia of right lung due to infectious organism 9/6/2019    PUD (peptic ulcer disease)     Temporal arteritis ([address] 9/26/2017    Vitamin D deficiency 9/26/2017    Zoster 9/26/2017     Past Surgical History:   Procedure Laterality Date    COLONOSCOPY N/A 8/8/2018    COLONOSCOPY performed by Azeem R[facility] at [facility]OSCOPY    COLONOSCOPY,DIAGNOSTIC  3/17/2015         COLONOSCOPY,DIAGNOSTIC  8/8/2018         HX COLONOSCOPY      HX ENDOSCOPY      HX GYN  1984    hysterectomy    HX GYN  1980's    laparoscopy    HX TONSILLECTOMY      MO ABDOMEN SURGERY PROC UNLISTED  1985    perforated ulcer    UPPER GI ENDOSCOPY,BIOPSY  8/8/2018          Allergies   Allergen Reactions    Augmentin [Amoxicillin-Pot Clavulanate] Unknown (comments)     \"hard on my stomach\"    Tetracycline Unknown (comments)     \"funny feeling in my mouth. ..years ago\"     Current Outpatient Medications   Medication Sig Dispense Refill    ipratropium (ATROVENT) 42 mcg (0.06 %) nasal spray 2 Sprays by Both Nostrils route four (4) times daily as needed for Rhinitis. 15 mL 0    simvastatin (ZOCOR) 20 mg tablet TAKE 1 TABLET AT BEDTIME 90 Tablet 3    omeprazole (PRILOSEC) 20 mg capsule TAKE 1 CAPSULE EVERY DAY 90 Capsule 3    latanoprost (XALATAN) 0.005 % ophthalmic solution       Cetirizine (ZyrTEC) 10 mg cap Take  by mouth. tiZANidine (ZANAFLEX) 4 mg tablet Take 1 Tablet by mouth three (3) times daily as needed for Pain. 30 Tablet 0    cefUROXime (CEFTIN) 250 mg tablet Take 250 mg by mouth every seven (7) days. cholecalciferol (VITAMIN D3) (5000 Units/125 mcg) tab tablet Take 5,000 Units by mouth daily. pyridoxine, vitamin B6, (VITAMIN B-6) 100 mg tablet Take 100 mg by mouth daily. estradioL (ESTRACE) 0.01 % (0.1 mg/gram) vaginal cream PLEASE SEE ATTACHED FOR DETAILED DIRECTIONS      dicyclomine (BENTYL) 10 mg capsule Take 1 Cap by mouth four (4) times daily as needed for Abdominal Cramps. 120 Cap 0    B.animalis,bifid,infantis,long 10-15 mg TbEC Take  by mouth as needed. multivit,iron,minerals/lutein (CENTRUM SILVER ULTRA WOMEN'S PO) Take  by mouth.       ascorbic acid, vitamin C, (VITAMIN C) 500 mg tablet Take  by mouth. Biotin 2,500 mcg cap Take 5,000 mg by mouth daily. melatonin tab tablet Take 10 mg by mouth nightly as needed. CALCIUM CARBONATE/VITAMIN D3 (CALTRATE WITH VITAMIN D3 PO) Take 2 Tabs by mouth daily. aspirin 81 mg tablet Take 81 mg by mouth.      cranberry 500 mg Cap Take 650 mg by mouth daily. Social History     Socioeconomic History    Marital status:    Tobacco Use    Smoking status: Never    Smokeless tobacco: Never   Vaping Use    Vaping Use: Never used   Substance and Sexual Activity    Alcohol use: Yes     Comment: rarely    Drug use: No     Family History   Problem Relation Age of Onset    Cancer Mother         uterine    Stroke Maternal Grandfather        Review of Systems  Constitutional:  negative for fevers, chills, anorexia and weight loss  Eyes:    negative for visual disturbance and irritation  ENT:    negative for tinnitus,sore throat,nasal congestion,ear pains. hoarseness  Respiratory:     negative for  hemoptysis, dyspnea,wheezing  CV:    negative for chest pain, palpitations, lower extremity edema  GI:    negative for nausea, vomiting, diarrhea, abdominal pain,melena  Endo:               negative for polyuria,polydipsia,polyphagia,heat intolerance  Genitourinary : negative for frequency, dysuria and hematuria  Integumentary: negative for rash and pruritus  Hematologic:   negative for easy bruising and gum/nose bleeding  Musculoskel:  negative for myalgias, arthralgias, back pain, muscle weakness, joint pain  Neurological:   negative for headaches, dizziness, vertigo, memory problems and gait   Behavl/Psych:  negative for feelings of anxiety, depression, mood changes  ROS otherwise negative      Objective:  Visit Vitals  /74 (BP 1 Location: Left upper arm, BP Patient Position: Sitting, BP Cuff Size: Adult)   Pulse 93   Temp 98.3 °F (36.8 °C) (Oral)   Resp 16   Ht 5' 4\" (1.626 m)   Wt 159 lb (72.1 kg)   SpO2 95%   BMI 27.29 kg/m² Physical Exam:   General appearance - alert, well appearing, and in no distress  Mental status - alert, oriented to person, place, and time  EYE-JENNIFER, EOMI, fundi normal, corneas normal, no foreign bodies  ENT-ENT exam normal, no neck nodes or sinus tenderness  Nose -nares mildly erythematous and boggy  Mouth - mucous membranes moist, posterior pharynx with minimal drainage clear in color  Neck - supple, no significant adenopathy   Chest - clear to auscultation, no wheezes, rales or rhonchi, symmetric air entry   Heart - normal rate, regular rhythm, normal S1, S2, no murmurs, rubs, clicks or gallops   Abdomen - soft, nontender, nondistended, no masses or organomegaly  Lymph- no adenopathy palpable  Ext-peripheral pulses normal, no pedal edema, no clubbing or cyanosis  Skin-Warm and dry. no hyperpigmentation, vitiligo, or suspicious lesions  Neuro -alert, oriented, normal speech, no focal findings or movement disorder noted      Assessment/Plan:  Diagnoses and all orders for this visit:    1. Cough    Other orders  -     ipratropium (ATROVENT) 42 mcg (0.06 %) nasal spray; 2 Sprays by Both Nostrils route four (4) times daily as needed for Rhinitis. ICD-10-CM ICD-9-CM    1. Cough  R05.9 786.2         Plan:    I suspect the patient has some lingering postnasal drainage and inflammation in the nasopharynx that is causing her to have drainage and have the sensation of constantly needing to clear her throat. I have recommended she try Atrovent nasal spray to see if this will help resolve her symptoms. Alternatively she could try Mucinex but states she has used this but it has not been very effective for her. I have reviewed with the patient details of the assessment and plan and all questions were answered. Relevent patient education was performed. Verbal and/or written instructions (see AVS) provided. The most recent lab findings were reviewed with the patient.   Plan was discussed with patient who verbally expressed understanding. An After Visit Summary was printed and given to the patient.     Nader Cortez MD

## 2022-10-12 ENCOUNTER — OFFICE VISIT (OUTPATIENT)
Dept: INTERNAL MEDICINE CLINIC | Age: 77
End: 2022-10-12
Payer: MEDICARE

## 2022-10-12 VITALS
SYSTOLIC BLOOD PRESSURE: 120 MMHG | DIASTOLIC BLOOD PRESSURE: 70 MMHG | WEIGHT: 165.2 LBS | TEMPERATURE: 97.9 F | HEIGHT: 64 IN | BODY MASS INDEX: 28.2 KG/M2 | RESPIRATION RATE: 16 BRPM

## 2022-10-12 DIAGNOSIS — R35.0 FREQUENCY OF MICTURITION: ICD-10-CM

## 2022-10-12 DIAGNOSIS — R53.83 FATIGUE, UNSPECIFIED TYPE: ICD-10-CM

## 2022-10-12 DIAGNOSIS — Z13.6 SCREENING FOR ISCHEMIC HEART DISEASE: ICD-10-CM

## 2022-10-12 DIAGNOSIS — K21.9 GASTROESOPHAGEAL REFLUX DISEASE WITHOUT ESOPHAGITIS: ICD-10-CM

## 2022-10-12 DIAGNOSIS — E78.2 MIXED HYPERLIPIDEMIA: ICD-10-CM

## 2022-10-12 DIAGNOSIS — R73.02 IGT (IMPAIRED GLUCOSE TOLERANCE): ICD-10-CM

## 2022-10-12 DIAGNOSIS — E55.9 VITAMIN D DEFICIENCY: ICD-10-CM

## 2022-10-12 DIAGNOSIS — Z79.899 ON STATIN THERAPY: ICD-10-CM

## 2022-10-12 DIAGNOSIS — K58.9 IRRITABLE BOWEL SYNDROME, UNSPECIFIED TYPE: ICD-10-CM

## 2022-10-12 DIAGNOSIS — Z13.1 SCREENING FOR DIABETES MELLITUS: ICD-10-CM

## 2022-10-12 DIAGNOSIS — Z23 NEEDS FLU SHOT: ICD-10-CM

## 2022-10-12 DIAGNOSIS — Z71.89 ADVANCED DIRECTIVES, COUNSELING/DISCUSSION: ICD-10-CM

## 2022-10-12 DIAGNOSIS — Z00.00 MEDICARE ANNUAL WELLNESS VISIT, SUBSEQUENT: Primary | ICD-10-CM

## 2022-10-12 DIAGNOSIS — Z13.31 SCREENING FOR DEPRESSION: ICD-10-CM

## 2022-10-12 DIAGNOSIS — M85.80 OSTEOPENIA, UNSPECIFIED LOCATION: ICD-10-CM

## 2022-10-12 LAB
25(OH)D3 SERPL-MCNC: 32.4 NG/ML (ref 30–100)
ALBUMIN SERPL-MCNC: 4.2 G/DL (ref 3.5–5)
ALBUMIN/GLOB SERPL: 1.4 {RATIO} (ref 1.1–2.2)
ALP SERPL-CCNC: 121 U/L (ref 45–117)
ALT SERPL-CCNC: 27 U/L (ref 12–78)
ANION GAP SERPL CALC-SCNC: 3 MMOL/L (ref 5–15)
APPEARANCE UR: CLEAR
AST SERPL-CCNC: 17 U/L (ref 15–37)
BACTERIA URNS QL MICRO: NEGATIVE /HPF
BASOPHILS # BLD: 0.1 K/UL (ref 0–0.1)
BASOPHILS NFR BLD: 1 % (ref 0–1)
BILIRUB SERPL-MCNC: 0.5 MG/DL (ref 0.2–1)
BILIRUB UR QL: NEGATIVE
BUN SERPL-MCNC: 17 MG/DL (ref 6–20)
BUN/CREAT SERPL: 20 (ref 12–20)
CALCIUM SERPL-MCNC: 9.2 MG/DL (ref 8.5–10.1)
CHLORIDE SERPL-SCNC: 106 MMOL/L (ref 97–108)
CHOLEST SERPL-MCNC: 169 MG/DL
CK SERPL-CCNC: 88 U/L (ref 26–192)
CO2 SERPL-SCNC: 30 MMOL/L (ref 21–32)
COLOR UR: ABNORMAL
CREAT SERPL-MCNC: 0.84 MG/DL (ref 0.55–1.02)
DIFFERENTIAL METHOD BLD: ABNORMAL
EOSINOPHIL # BLD: 0.2 K/UL (ref 0–0.4)
EOSINOPHIL NFR BLD: 4 % (ref 0–7)
EPITH CASTS URNS QL MICRO: ABNORMAL /LPF
ERYTHROCYTE [DISTWIDTH] IN BLOOD BY AUTOMATED COUNT: 14.6 % (ref 11.5–14.5)
EST. AVERAGE GLUCOSE BLD GHB EST-MCNC: 120 MG/DL
GLOBULIN SER CALC-MCNC: 3.1 G/DL (ref 2–4)
GLUCOSE SERPL-MCNC: 90 MG/DL (ref 65–100)
GLUCOSE UR STRIP.AUTO-MCNC: NEGATIVE MG/DL
HBA1C MFR BLD: 5.8 % (ref 4–5.6)
HCT VFR BLD AUTO: 43.5 % (ref 35–47)
HDLC SERPL-MCNC: 84 MG/DL
HDLC SERPL: 2 {RATIO} (ref 0–5)
HGB BLD-MCNC: 14.1 G/DL (ref 11.5–16)
HGB UR QL STRIP: NEGATIVE
HYALINE CASTS URNS QL MICRO: ABNORMAL /LPF (ref 0–5)
IMM GRANULOCYTES # BLD AUTO: 0 K/UL (ref 0–0.04)
IMM GRANULOCYTES NFR BLD AUTO: 0 % (ref 0–0.5)
KETONES UR QL STRIP.AUTO: NEGATIVE MG/DL
LDLC SERPL CALC-MCNC: 73 MG/DL (ref 0–100)
LEUKOCYTE ESTERASE UR QL STRIP.AUTO: ABNORMAL
LYMPHOCYTES # BLD: 1.8 K/UL (ref 0.8–3.5)
LYMPHOCYTES NFR BLD: 35 % (ref 12–49)
MCH RBC QN AUTO: 30.9 PG (ref 26–34)
MCHC RBC AUTO-ENTMCNC: 32.4 G/DL (ref 30–36.5)
MCV RBC AUTO: 95.2 FL (ref 80–99)
MONOCYTES # BLD: 0.5 K/UL (ref 0–1)
MONOCYTES NFR BLD: 9 % (ref 5–13)
NEUTS SEG # BLD: 2.5 K/UL (ref 1.8–8)
NEUTS SEG NFR BLD: 51 % (ref 32–75)
NITRITE UR QL STRIP.AUTO: NEGATIVE
NRBC # BLD: 0 K/UL (ref 0–0.01)
NRBC BLD-RTO: 0 PER 100 WBC
PH UR STRIP: 5 [PH] (ref 5–8)
PLATELET # BLD AUTO: 275 K/UL (ref 150–400)
PMV BLD AUTO: 10.9 FL (ref 8.9–12.9)
POTASSIUM SERPL-SCNC: 4.3 MMOL/L (ref 3.5–5.1)
PROT SERPL-MCNC: 7.3 G/DL (ref 6.4–8.2)
PROT UR STRIP-MCNC: NEGATIVE MG/DL
RBC # BLD AUTO: 4.57 M/UL (ref 3.8–5.2)
RBC #/AREA URNS HPF: ABNORMAL /HPF (ref 0–5)
SODIUM SERPL-SCNC: 139 MMOL/L (ref 136–145)
SP GR UR REFRACTOMETRY: 1.01 (ref 1–1.03)
TRIGL SERPL-MCNC: 60 MG/DL (ref ?–150)
UROBILINOGEN UR QL STRIP.AUTO: 0.2 EU/DL (ref 0.2–1)
VLDLC SERPL CALC-MCNC: 12 MG/DL
WBC # BLD AUTO: 5 K/UL (ref 3.6–11)
WBC URNS QL MICRO: ABNORMAL /HPF (ref 0–4)

## 2022-10-12 PROCEDURE — 1101F PT FALLS ASSESS-DOCD LE1/YR: CPT | Performed by: INTERNAL MEDICINE

## 2022-10-12 PROCEDURE — 90694 VACC AIIV4 NO PRSRV 0.5ML IM: CPT | Performed by: INTERNAL MEDICINE

## 2022-10-12 PROCEDURE — G0439 PPPS, SUBSEQ VISIT: HCPCS | Performed by: INTERNAL MEDICINE

## 2022-10-12 PROCEDURE — G8536 NO DOC ELDER MAL SCRN: HCPCS | Performed by: INTERNAL MEDICINE

## 2022-10-12 PROCEDURE — G8510 SCR DEP NEG, NO PLAN REQD: HCPCS | Performed by: INTERNAL MEDICINE

## 2022-10-12 PROCEDURE — G8417 CALC BMI ABV UP PARAM F/U: HCPCS | Performed by: INTERNAL MEDICINE

## 2022-10-12 PROCEDURE — G8427 DOCREV CUR MEDS BY ELIG CLIN: HCPCS | Performed by: INTERNAL MEDICINE

## 2022-10-12 PROCEDURE — G0008 ADMIN INFLUENZA VIRUS VAC: HCPCS | Performed by: INTERNAL MEDICINE

## 2022-10-12 PROCEDURE — G8399 PT W/DXA RESULTS DOCUMENT: HCPCS | Performed by: INTERNAL MEDICINE

## 2022-10-12 PROCEDURE — 1123F ACP DISCUSS/DSCN MKR DOCD: CPT | Performed by: INTERNAL MEDICINE

## 2022-10-12 NOTE — PROGRESS NOTES
This is the Subsequent Medicare Annual Wellness Exam, performed 12 months or more after the Initial AWV or the last Subsequent AWV    I have reviewed the patient's medical history in detail and updated the computerized patient record. Assessment/Plan   Education and counseling provided:  Are appropriate based on today's review and evaluation    1. Medicare annual wellness visit, subsequent  2. Gastroesophageal reflux disease without esophagitis  3. Irritable bowel syndrome, unspecified type  4. IGT (impaired glucose tolerance)  -     METABOLIC PANEL, COMPREHENSIVE; Future  -     HEMOGLOBIN A1C WITH EAG; Future  5. Osteopenia, unspecified location  6. Mixed hyperlipidemia  -     CK; Future  -     LIPID PANEL; Future  -     METABOLIC PANEL, COMPREHENSIVE; Future  7. On statin therapy  -     CK; Future  -     LIPID PANEL; Future  -     METABOLIC PANEL, COMPREHENSIVE; Future  8. Vitamin D deficiency  -     VITAMIN D, 25 HYDROXY; Future  9. Fatigue, unspecified type  -     CBC WITH AUTOMATED DIFF; Future  10. Frequency of micturition  -     URINALYSIS W/ RFLX MICROSCOPIC; Future  11. Needs flu shot  -     INFLUENZA, FLUAD, (AGE 65 Y+), IM, PF, 0.5 ML  12. Advanced directives, counseling/discussion  13. Screening for diabetes mellitus  14. Screening for ischemic heart disease  15. Screening for depression  -     DEPRESSION SCREEN ANNUAL       Depression Risk Factor Screening     3 most recent PHQ Screens 10/12/2022   Little interest or pleasure in doing things Not at all   Feeling down, depressed, irritable, or hopeless Not at all   Total Score PHQ 2 0       Alcohol & Drug Abuse Risk Screen    Do you average more than 1 drink per night or more than 7 drinks a week:  No    On any one occasion in the past three months have you have had more than 3 drinks containing alcohol:  No          Functional Ability and Level of Safety    Hearing: Hearing is good. Activities of Daily Living:   The home contains: no safety equipment. Patient does total self care      Ambulation: with no difficulty     Fall Risk:  Fall Risk Assessment, last 12 mths 10/12/2022   Able to walk? Yes   Fall in past 12 months? 0   Do you feel unsteady? 0   Are you worried about falling 0   Number of falls in past 12 months -   Fall with injury?  -      Abuse Screen:  Patient is not abused       Cognitive Screening    Has your family/caregiver stated any concerns about your memory: no     Cognitive Screening: Normal - Verbal Fluency Test    Health Maintenance Due     Health Maintenance Due   Topic Date Due    COVID-19 Vaccine (3 - Booster for Mamaherb series) 08/26/2021    Flu Vaccine (1) 08/01/2022       Patient Care Team   Patient Care Team:  Silver Phillips MD as PCP - General (Internal Medicine Physician)  Silver Phillips MD as PCP - [facility] Provider  Angie Nichole MD as Physician (Cardiovascular Disease Physician)    History     Patient Active Problem List   Diagnosis Code    Abdominal pain R10.9    Gallstones K80.20    Hyperlipidemia E78.5    Osteopenia M85.80    Zoster B02.9    Melanoma ([address] C43.9    On statin therapy Z79.899    Vitamin D deficiency E55.9    IGT (impaired glucose tolerance) R73.02    IBS (irritable bowel syndrome) K58.9    Duodenal ulcer, perforated ([address] K26.5    Insomnia G47.00    Back pain M54.9    GERD (gastroesophageal reflux disease) K21.9     Past Medical History:   Diagnosis Date    Arthritis     hands    Cancer ([address]     basal cell of face, squamous, and melanoma    Colitis 04/21/2018    [facility] ER     Colitis, ischemic ([address] 9/26/2017    Duodenal ulcer, perforated ([address] 9/26/2017    Dyspepsia and other specified disorders of function of stomach     GERD (gastroesophageal reflux disease)     History of rectal bleeding 04/2018    Two episodes; first one 19 years ago - labeled as a \"possible ischemic attack\"    Hypercholesterolemia     Hyperlipidemia 9/26/2017    IBS (irritable bowel syndrome) 9/26/2017    IGT (impaired glucose tolerance) 09/26/2017    Borderline Hgb A1C    Insomnia 9/26/2017    Melanoma [address] 9/26/2017    On statin therapy 9/26/2017    Osteopenia 9/26/2017    Pneumonia of right lung due to infectious organism 9/6/2019    PUD (peptic ulcer disease)     Temporal arteritis ([facility] 9/26/2017    Vitamin D deficiency 9/26/2017    Zoster 9/26/2017      Past Surgical History:   Procedure Laterality Date    COLONOSCOPY N/A 8/8/2018    COLONOSCOPY performed by Jayce Maxwell MD at [facility] ENDOSCOPY    COLONOSCOPY,DIAGNOSTIC  3/17/2015         COLONOSCOPY,DIAGNOSTIC  8/8/2018         HX COLONOSCOPY      HX ENDOSCOPY      HX GYN  1984    hysterectomy    HX GYN  1980's    laparoscopy    HX TONSILLECTOMY      AL ABDOMEN SURGERY PROC UNLISTED  1985    perforated ulcer    UPPER GI ENDOSCOPY,BIOPSY  8/8/2018          Current Outpatient Medications   Medication Sig Dispense Refill    ipratropium (ATROVENT) 42 mcg (0.06 %) nasal spray 2 Sprays by Both Nostrils route four (4) times daily as needed for Rhinitis. 15 mL 0    simvastatin (ZOCOR) 20 mg tablet TAKE 1 TABLET AT BEDTIME 90 Tablet 3    omeprazole (PRILOSEC) 20 mg capsule TAKE 1 CAPSULE EVERY DAY 90 Capsule 3    latanoprost (XALATAN) 0.005 % ophthalmic solution       Cetirizine (ZyrTEC) 10 mg cap Take  by mouth. tiZANidine (ZANAFLEX) 4 mg tablet Take 1 Tablet by mouth three (3) times daily as needed for Pain. 30 Tablet 0    cefUROXime (CEFTIN) 250 mg tablet Take 250 mg by mouth every seven (7) days. cholecalciferol (VITAMIN D3) (5000 Units/125 mcg) tab tablet Take 5,000 Units by mouth daily. pyridoxine, vitamin B6, (VITAMIN B-6) 100 mg tablet Take 100 mg by mouth daily. estradioL (ESTRACE) 0.01 % (0.1 mg/gram) vaginal cream PLEASE SEE ATTACHED FOR DETAILED DIRECTIONS      dicyclomine (BENTYL) 10 mg capsule Take 1 Cap by mouth four (4) times daily as needed for Abdominal Cramps.  120 Cap 0 B.animalis,bifid,infantis,long 10-15 mg TbEC Take  by mouth as needed. multivit,iron,minerals/lutein (CENTRUM SILVER ULTRA WOMEN'S PO) Take  by mouth. ascorbic acid, vitamin C, (VITAMIN C) 500 mg tablet Take  by mouth. Biotin 2,500 mcg cap Take 5,000 mg by mouth daily. melatonin tab tablet Take 10 mg by mouth nightly as needed. CALCIUM CARBONATE/VITAMIN D3 (CALTRATE WITH VITAMIN D3 PO) Take 2 Tabs by mouth daily. aspirin 81 mg tablet Take 81 mg by mouth.      cranberry 500 mg Cap Take 650 mg by mouth daily. Allergies   Allergen Reactions    Augmentin [Amoxicillin-Pot Clavulanate] Unknown (comments)     \"hard on my stomach\"    Tetracycline Unknown (comments)     \"funny feeling in my mouth. ..years ago\"       Family History   Problem Relation Age of Onset    Cancer Mother         uterine    Stroke Maternal Grandfather      Social History     Tobacco Use    Smoking status: Never    Smokeless tobacco: Never   Substance Use Topics    Alcohol use: Yes     Comment: rarely         Colton Marcial MD           Jacob Lew is a 65 y.o. female and presents with Follow Up Chronic Condition (6 mo fu) and Annual Wellness Visit  . Subjective: Lillie Jang presents today for Medicare annual wellness review and follow-up of several problems including peptic ulcer disease hyperlipidemia, osteoarthritis, monitoring statin therapy,-year-old bowel syndrome. She is doing well on her current medical regimen. She denies any side effects with her medications currently. She did have a mammogram done recently. She questions whether she should have a screening MRI or ultrasound done with her mammogram.  She has a remote history of melanoma. She has no shortness of breath, chest pain, palpitations, PND, orthopnea, or pedal edema.     Past Medical History:   Diagnosis Date    Arthritis     hands    Cancer ([facility]     basal cell of face, squamous, and melanoma    Colitis 04/21/2018    [facility] Colitis, ischemic ([facility] 9/26/2017    Duodenal ulcer, perforated ([facility] 9/26/2017    Dyspepsia and other specified disorders of function of stomach     GERD (gastroesophageal reflux disease)     History of rectal bleeding 04/2018    Two episodes; first one 19 years ago - labeled as a \"possible ischemic attack\"    Hypercholesterolemia     Hyperlipidemia 9/26/2017    IBS (irritable bowel syndrome) 9/26/2017    IGT (impaired glucose tolerance) 09/26/2017    Borderline Hgb A1C    Insomnia 9/26/2017    Melanoma ([facility] 9/26/2017    On statin therapy 9/26/2017    Osteopenia 9/26/2017    Pneumonia of right lung due to infectious organism 9/6/2019    PUD (peptic ulcer disease)     Temporal arteritis ([facility] 9/26/2017    Vitamin D deficiency 9/26/2017    Zoster 9/26/2017     Past Surgical History:   Procedure Laterality Date    COLONOSCOPY N/A 8/8/2018    COLONOSCOPY performed by Jerl Siemens., MD at [address]  3/17/2015         COLONOSCOPY,DIAGNOSTIC  8/8/2018         HX COLONOSCOPY      HX ENDOSCOPY      HX GYN  1984    hysterectomy    HX GYN  1980's    laparoscopy    HX TONSILLECTOMY      MT ABDOMEN SURGERY PROC UNLISTED  1985    perforated ulcer    UPPER GI ENDOSCOPY,BIOPSY  8/8/2018          Allergies   Allergen Reactions    Augmentin [Amoxicillin-Pot Clavulanate] Unknown (comments)     \"hard on my stomach\"    Tetracycline Unknown (comments)     \"funny feeling in my mouth. ..years ago\"     Current Outpatient Medications   Medication Sig Dispense Refill    ipratropium (ATROVENT) 42 mcg (0.06 %) nasal spray 2 Sprays by Both Nostrils route four (4) times daily as needed for Rhinitis. 15 mL 0    simvastatin (ZOCOR) 20 mg tablet TAKE 1 TABLET AT BEDTIME 90 Tablet 3    omeprazole (PRILOSEC) 20 mg capsule TAKE 1 CAPSULE EVERY DAY 90 Capsule 3    latanoprost (XALATAN) 0.005 % ophthalmic solution       Cetirizine (ZyrTEC) 10 mg cap Take  by mouth.       tiZANidine (ZANAFLEX) 4 mg tablet Take 1 Tablet by mouth three (3) times daily as needed for Pain. 30 Tablet 0    cefUROXime (CEFTIN) 250 mg tablet Take 250 mg by mouth every seven (7) days. cholecalciferol (VITAMIN D3) (5000 Units/125 mcg) tab tablet Take 5,000 Units by mouth daily. pyridoxine, vitamin B6, (VITAMIN B-6) 100 mg tablet Take 100 mg by mouth daily. estradioL (ESTRACE) 0.01 % (0.1 mg/gram) vaginal cream PLEASE SEE ATTACHED FOR DETAILED DIRECTIONS      dicyclomine (BENTYL) 10 mg capsule Take 1 Cap by mouth four (4) times daily as needed for Abdominal Cramps. 120 Cap 0    B.animalis,bifid,infantis,long 10-15 mg TbEC Take  by mouth as needed. multivit,iron,minerals/lutein (CENTRUM SILVER ULTRA WOMEN'S PO) Take  by mouth. ascorbic acid, vitamin C, (VITAMIN C) 500 mg tablet Take  by mouth. Biotin 2,500 mcg cap Take 5,000 mg by mouth daily. melatonin tab tablet Take 10 mg by mouth nightly as needed. CALCIUM CARBONATE/VITAMIN D3 (CALTRATE WITH VITAMIN D3 PO) Take 2 Tabs by mouth daily. aspirin 81 mg tablet Take 81 mg by mouth.      cranberry 500 mg Cap Take 650 mg by mouth daily. Social History     Socioeconomic History    Marital status:    Tobacco Use    Smoking status: Never    Smokeless tobacco: Never   Vaping Use    Vaping Use: Never used   Substance and Sexual Activity    Alcohol use: Yes     Comment: rarely    Drug use: No     Family History   Problem Relation Age of Onset    Cancer Mother         uterine    Stroke Maternal Grandfather        Review of Systems  Constitutional:  negative for fevers, chills, anorexia and weight loss  Eyes:    negative for visual disturbance and irritation  ENT:    negative for tinnitus,sore throat,nasal congestion,ear pains. hoarseness  Respiratory:     negative for cough, hemoptysis, dyspnea,wheezing  CV:    negative for chest pain, palpitations, lower extremity edema  GI:    negative for nausea, vomiting, diarrhea, abdominal pain,melena  Endo: negative for polyuria,polydipsia,polyphagia,heat intolerance  Genitourinary : negative for frequency, dysuria and hematuria  Integumentary: negative for rash and pruritus  Hematologic:   negative for easy bruising and gum/nose bleeding  Musculoskel:  negative for myalgias, arthralgias, back pain, muscle weakness, joint pain  Neurological:   negative for headaches, dizziness, vertigo, memory problems and gait   Behavl/Psych:  negative for feelings of anxiety, depression, mood changes  ROS otherwise negative      Objective:  Visit Vitals  /70 (BP 1 Location: Left upper arm, BP Patient Position: Sitting, BP Cuff Size: Adult)   Temp 97.9 °F (36.6 °C) (Oral)   Resp 16   Ht 5' 4\" (1.626 m)   Wt 165 lb 3.2 oz (74.9 kg)   BMI 28.36 kg/m²     Physical Exam:   General appearance - alert, well appearing, and in no distress  Mental status - alert, oriented to person, place, and time  EYE-JENNIFER, EOMI, fundi normal, corneas normal, no foreign bodies  ENT-ENT exam normal, no neck nodes or sinus tenderness  Nose - normal and patent, no erythema, discharge or polyps  Mouth - mucous membranes moist, pharynx normal without lesions  Neck - supple, no significant adenopathy   Chest - clear to auscultation, no wheezes, rales or rhonchi, symmetric air entry   Heart - normal rate, regular rhythm, normal S1, S2, no murmurs, rubs, clicks or gallops   Abdomen - soft, nontender, nondistended, no masses or organomegaly  Lymph- no adenopathy palpable  Ext-peripheral pulses normal, no pedal edema, no clubbing or cyanosis  Skin-Warm and dry. no hyperpigmentation, vitiligo, or suspicious lesions  Neuro -alert, oriented, normal speech, no focal findings or movement disorder noted      Assessment/Plan:  Diagnoses and all orders for this visit:    1. Medicare annual wellness visit, subsequent    2. Gastroesophageal reflux disease without esophagitis    3. Irritable bowel syndrome, unspecified type    4.  IGT (impaired glucose tolerance)  - METABOLIC PANEL, COMPREHENSIVE; Future  -     HEMOGLOBIN A1C WITH EAG; Future    5. Osteopenia, unspecified location    6. Mixed hyperlipidemia  -     CK; Future  -     LIPID PANEL; Future  -     METABOLIC PANEL, COMPREHENSIVE; Future    7. On statin therapy  -     CK; Future  -     LIPID PANEL; Future  -     METABOLIC PANEL, COMPREHENSIVE; Future    8. Vitamin D deficiency  -     VITAMIN D, 25 HYDROXY; Future    9. Fatigue, unspecified type  -     CBC WITH AUTOMATED DIFF; Future    10. Frequency of micturition  -     URINALYSIS W/ RFLX MICROSCOPIC; Future    11. Needs flu shot  -     INFLUENZA, FLUAD, (AGE 65 Y+), IM, PF, 0.5 ML    12. Advanced directives, counseling/discussion    13. Screening for diabetes mellitus    14. Screening for ischemic heart disease    15. Screening for depression  -     Jolanta Berrios          ICD-10-CM ICD-9-CM    1. Medicare annual wellness visit, subsequent  Z00.00 V70.0       2. Gastroesophageal reflux disease without esophagitis  K21.9 530.81       3. Irritable bowel syndrome, unspecified type  K58.9 564.1       4. IGT (impaired glucose tolerance)  F40.69 235.36 METABOLIC PANEL, COMPREHENSIVE      HEMOGLOBIN A1C WITH EAG      5. Osteopenia, unspecified location  M85.80 733.90       6. Mixed hyperlipidemia  E78.2 272.2 CK      LIPID PANEL      METABOLIC PANEL, COMPREHENSIVE      7. On statin therapy  Z79.899 V58.69 CK      LIPID PANEL      METABOLIC PANEL, COMPREHENSIVE      8. Vitamin D deficiency  E55.9 268.9 VITAMIN D, 25 HYDROXY      9. Fatigue, unspecified type  R53.83 780.79 CBC WITH AUTOMATED DIFF      10. Frequency of micturition  R35.0 788.41 URINALYSIS W/ RFLX MICROSCOPIC      11. Needs flu shot  Z23 V04.81 INFLUENZA, FLUAD, (AGE 65 Y+), IM, PF, 0.5 ML      12. Advanced directives, counseling/discussion  Z71.89 V65.49       13. Screening for diabetes mellitus  Z13.1 V77.1       14. Screening for ischemic heart disease  Z13.6 V81.0       15.  Screening for depression  Z13.31 V79.0 DEPRESSION SCREEN ANNUAL        Plan:    Continue current medical regimen as outlined above. Further recommendations based on labs as ordered. Flu vaccine to be given today. I have reviewed with the patient details of the assessment and plan and all questions were answered. Relevent patient education was performed. Verbal and/or written instructions (see AVS) provided. The most recent lab findings were reviewed with the patient. Plan was discussed with patient who verbally expressed understanding. An After Visit Summary was printed and given to the patient.     Jaimee Rodriguez MD

## 2022-10-12 NOTE — PATIENT INSTRUCTIONS
Vaccine Information Statement    Influenza (Flu) Vaccine (Inactivated or Recombinant): What You Need to Know    Many vaccine information statements are available in [place] and other languages. See www.immunize.org/vis. Hojas de información sobre vacunas están disponibles en español y en muchos otros idiomas. Visite www.immunize.org/vis. 1. Why get vaccinated? Influenza vaccine can prevent influenza (flu). Flu is a contagious disease that spreads around the [facility] every year, usually between October and May. Anyone can get the flu, but it is more dangerous for some people. Infants and young children, people 72 years and older, pregnant people, and people with certain health conditions or a weakened immune system are at greatest risk of flu complications. Pneumonia, bronchitis, sinus infections, and ear infections are examples of flu-related complications. If you have a medical condition, such as heart disease, cancer, or diabetes, flu can make it worse. Flu can cause fever and chills, sore throat, muscle aches, fatigue, cough, headache, and runny or stuffy nose. Some people may have vomiting and diarrhea, though this is more common in children than adults. In an average year, thousands of people in the [facility] die from flu, and many more are hospitalized. Flu vaccine prevents millions of illnesses and flu-related visits to the doctor each year. 2. Influenza vaccines     CDC recommends everyone 6 months and older get vaccinated every flu season. Children 6 months through 6years of age may need 2 doses during a single flu season. Everyone else needs only 1 dose each flu season. It takes about 2 weeks for protection to develop after vaccination. There are many flu viruses, and they are always changing. Each year a new flu vaccine is made to protect against the influenza viruses believed to be likely to cause disease in the upcoming flu season.  Even when the vaccine doesnt exactly match these viruses, it may still provide some protection. Influenza vaccine does not cause flu. Influenza vaccine may be given at the same time as other vaccines. 3. Talk with your health care provider    Tell your vaccination provider if the person getting the vaccine:  Has had an allergic reaction after a previous dose of influenza vaccine, or has any severe, life-threatening allergies   Has ever had Guillain-Barré Syndrome (also called GBS)    In some cases, your health care provider may decide to postpone influenza vaccination until a future visit. Influenza vaccine can be administered at any time during pregnancy. People who are or will be pregnant during influenza season should receive inactivated influenza vaccine. People with minor illnesses, such as a cold, may be vaccinated. People who are moderately or severely ill should usually wait until they recover before getting influenza vaccine. Your health care provider can give you more information. 4. Risks of a vaccine reaction    Soreness, redness, and swelling where the shot is given, fever, muscle aches, and headache can happen after influenza vaccination. There may be a very small increased risk of Guillain-Barré Syndrome (GBS) after inactivated influenza vaccine (the flu shot). [facility] children who get the flu shot along with pneumococcal vaccine (PCV13) and/or DTaP vaccine at the same time might be slightly more likely to have a seizure caused by fever. Tell your health care provider if a child who is getting flu vaccine has ever had a seizure. People sometimes faint after medical procedures, including vaccination. Tell your provider if you feel dizzy or have vision changes or ringing in the ears. As with any medicine, there is a very remote chance of a vaccine causing a severe allergic reaction, other serious injury, or death. 5. What if there is a serious problem?     An allergic reaction could occur after the vaccinated person leaves the clinic. If you see signs of a severe allergic reaction (hives, swelling of the face and throat, difficulty breathing, a fast heartbeat, dizziness, or weakness), call 9-1-1 and get the person to the nearest hospital.    For other signs that concern you, call your health care provider. Adverse reactions should be reported to the Vaccine Adverse Event Reporting System (VAERS). Your health care provider will usually file this report, or you can do it yourself. Visit the VAERS website at www.vaers. [facility].gov or call 0-151.917.9463. VAERS is only for reporting reactions, and VAERS staff members do not give medical advice. 6. The National Vaccine Injury Compensation Program    The [facility] Vaccine Injury Compensation Program (VICP) is a federal program that was created to compensate people who may have been injured by certain vaccines. Claims regarding alleged injury or death due to vaccination have a time limit for filing, which may be as short as two years. Visit the VICP website at www.[facility].gov/vaccinecompensation or call 5-616.997.2668 to learn about the program and about filing a claim. 7. How can I learn more? Ask your health care provider. Call your local or state health department. Visit the website of the Food and Drug Administration (FDA) for vaccine package inserts and additional information at www.fda.gov/vaccines-blood-biologics/vaccines. Contact the Centers for Disease Control and Prevention (CDC): Call 1-454.753.5908 (5-422-OAI-INFO) or  Visit CDCs influenza website at www.cdc.gov/flu. Vaccine Information Statement   Inactivated Influenza Vaccine   8/6/2021  42 . Kristina Plan [id number]   Department of Health and Human Services  Centers for Disease Control and Prevention    Office Use Only      Medicare Wellness Visit, Female     The best way to live healthy is to have a lifestyle where you eat a well-balanced diet, exercise regularly, limit alcohol use, and quit all forms of tobacco/nicotine, if applicable. Regular preventive services are another way to keep healthy. Preventive services (vaccines, screening tests, monitoring & exams) can help personalize your care plan, which helps you manage your own care. Screening tests can find health problems at the earliest stages, when they are easiest to treat. [facility] follows the current, evidence-based guidelines published by the [facility] Leroy Murphy ([facility]) when recommending preventive services for our patients. Because we follow these guidelines, sometimes recommendations change over time as research supports it. (For example, mammograms used to be recommended annually. Even though Medicare will still pay for an annual mammogram, the newer guidelines recommend a mammogram every two years for women of average risk). Of course, you and your doctor may decide to screen more often for some diseases, based on your risk and your co-morbidities (chronic disease you are already diagnosed with). Preventive services for you include:  - Medicare offers their members a free annual wellness visit, which is time for you and your primary care provider to discuss and plan for your preventive service needs. Take advantage of this benefit every year!  -All adults over the age of 72 should receive the recommended pneumonia vaccines. Current USPSTF guidelines recommend a series of two vaccines for the best pneumonia protection.   -All adults should have a flu vaccine yearly and a tetanus vaccine every 10 years.   -All adults age 48 and older should receive the shingles vaccines (series of two vaccines).       -All adults age 38-68 who are overweight should have a diabetes screening test once every three years.   -All adults born between [birth year] and 1965 should be screened once for Hepatitis C.  -Other screening tests and preventive services for persons with diabetes include: an eye exam to screen for diabetic retinopathy, a kidney function test, a foot exam, and stricter control over your cholesterol.   -Cardiovascular screening for adults with routine risk involves an electrocardiogram (ECG) at intervals determined by your doctor.   -Colorectal cancer screenings should be done for adults age 54-65 with no increased risk factors for colorectal cancer. There are a number of acceptable methods of screening for this type of cancer. Each test has its own benefits and drawbacks. Discuss with your doctor what is most appropriate for you during your annual wellness visit. The different tests include: colonoscopy (considered the best screening method), a fecal occult blood test, a fecal DNA test, and sigmoidoscopy.    -A bone mass density test is recommended when a woman turns 65 to screen for osteoporosis. This test is only recommended one time, as a screening. Some providers will use this same test as a disease monitoring tool if you already have osteoporosis. -Breast cancer screenings are recommended every other year for women of normal risk, age 54-69.  -Cervical cancer screenings for women over age 72 are only recommended with certain risk factors.      Here is a list of your current Health Maintenance items (your personalized list of preventive services) with a due date:  Health Maintenance Due   Topic Date Due    COVID-19 Vaccine (3 - Booster for Stern Peter series) 08/26/2021    Yearly Flu Vaccine (1) 08/01/2022

## 2022-10-12 NOTE — PROGRESS NOTES
Chief Complaint   Patient presents with    Follow Up Chronic Condition     6 mo fu    Annual Wellness Visit       Depression Risk Factor Screening:     3 most recent PHQ Screens 10/12/2022   Little interest or pleasure in doing things Not at all   Feeling down, depressed, irritable, or hopeless Not at all   Total Score PHQ 2 0       Functional Ability and Level of Safety:     Activities of Daily Living  ADL Assessment 10/12/2022   Feeding yourself No Help Needed   Getting from bed to chair No Help Needed   Getting dressed No Help Needed   Bathing or showering No Help Needed   Walk across the room (includes cane/walker) No Help Needed   Using the telphone No Help Needed   Taking your medications No Help Needed   Preparing meals No Help Needed   Managing money (expenses/bills) No Help Needed   Moderately strenuous housework (laundry) No Help Needed   Shopping for personal items (toiletries/medicines) No Help Needed   Shopping for groceries No Help Needed   Driving No Help Needed   Climbing a flight of stairs No Help Needed   Getting to places beyond walking distances No Help Needed       Fall Risk  Fall Risk Assessment, last 12 mths 10/12/2022   Able to walk? Yes   Fall in past 12 months? 0   Do you feel unsteady? 0   Are you worried about falling 0   Number of falls in past 12 months -   Fall with injury? -       Abuse Screen  Abuse Screening Questionnaire 10/12/2022   Do you ever feel afraid of your partner? N   Are you in a relationship with someone who physically or mentally threatens you? N   Is it safe for you to go home? Y         Patient Care Team   Patient Care Team:  June Maldonado MD as PCP - General (Internal Medicine Physician)  June Maldonado MD as PCP - [facility] Empaneled Provider  Cherie Toledo MD as Physician (Cardiovascular Disease Physician)  After obtaining written consent and per orders of Dr. Andrews Alamo, injection of Fluad given by Claudiodaquan Berman, [address] [place] [address].  Patient tolerated procedure well. VIS was given to them. No reactions noted.

## 2023-01-19 NOTE — PROGRESS NOTES
Reviewed record in preparation for visit and have obtained necessary documentation. Identified pt with two pt identifiers(name and ). Chief Complaint   Patient presents with    Insect Bite        Coordination of Care Questionnaire:  :     1) Have you been to an emergency room, urgent care clinic since your last visit? No     Hospitalized since your last visit? No               2) Have you seen or consulted any other health care providers outside of [address] [place] [address] since your last visit?  Yes Dr Esdras Noel Left ovary, omentum, pelvic washings,

## 2023-01-31 ENCOUNTER — TELEPHONE (OUTPATIENT)
Dept: INTERNAL MEDICINE CLINIC | Age: 78
End: 2023-01-31

## 2023-01-31 NOTE — TELEPHONE ENCOUNTER
Patient called to request her lab results from 10/12/22 .   Please mail to:    [id number], [address]

## 2023-05-21 RX ORDER — SIMVASTATIN 20 MG
1 TABLET ORAL NIGHTLY
COMMUNITY
Start: 2023-04-14

## 2023-05-21 RX ORDER — DICYCLOMINE HYDROCHLORIDE 10 MG/1
10 CAPSULE ORAL 4 TIMES DAILY PRN
COMMUNITY
Start: 2021-04-21

## 2023-05-21 RX ORDER — CEFUROXIME AXETIL 250 MG/1
250 TABLET ORAL
COMMUNITY
Start: 2021-04-14

## 2023-05-21 RX ORDER — IPRATROPIUM BROMIDE 42 UG/1
2 SPRAY, METERED NASAL 4 TIMES DAILY PRN
COMMUNITY
Start: 2022-07-27

## 2023-05-21 RX ORDER — LATANOPROST 50 UG/ML
SOLUTION/ DROPS OPHTHALMIC
COMMUNITY
Start: 2021-10-26

## 2023-05-21 RX ORDER — OMEPRAZOLE 20 MG/1
1 CAPSULE, DELAYED RELEASE ORAL DAILY
COMMUNITY
Start: 2023-04-14

## 2023-05-21 RX ORDER — PYRIDOXINE HCL (VITAMIN B6) 100 MG
100 TABLET ORAL DAILY
COMMUNITY

## 2023-05-21 RX ORDER — ASCORBIC ACID 500 MG
TABLET ORAL
COMMUNITY

## 2023-05-21 RX ORDER — CHOLECALCIFEROL (VITAMIN D3) 125 MCG
10 CAPSULE ORAL
COMMUNITY

## 2023-05-21 RX ORDER — CETIRIZINE HYDROCHLORIDE 10 MG/1
CAPSULE, LIQUID FILLED ORAL
COMMUNITY

## 2023-05-21 RX ORDER — ESTRADIOL 0.1 MG/G
CREAM VAGINAL
COMMUNITY
Start: 2021-02-26

## 2023-05-21 RX ORDER — TIZANIDINE 4 MG/1
4 TABLET ORAL 3 TIMES DAILY PRN
COMMUNITY
Start: 2021-06-04

## 2023-10-04 ENCOUNTER — OFFICE VISIT (OUTPATIENT)
Facility: CLINIC | Age: 78
End: 2023-10-04
Payer: MEDICARE

## 2023-10-04 VITALS
WEIGHT: 164 LBS | RESPIRATION RATE: 20 BRPM | HEIGHT: 64 IN | OXYGEN SATURATION: 99 % | HEART RATE: 81 BPM | SYSTOLIC BLOOD PRESSURE: 126 MMHG | TEMPERATURE: 97.9 F | DIASTOLIC BLOOD PRESSURE: 76 MMHG | BODY MASS INDEX: 28 KG/M2

## 2023-10-04 DIAGNOSIS — M54.50 LUMBAR BACK PAIN: Primary | ICD-10-CM

## 2023-10-04 PROCEDURE — G8484 FLU IMMUNIZE NO ADMIN: HCPCS | Performed by: INTERNAL MEDICINE

## 2023-10-04 PROCEDURE — 1090F PRES/ABSN URINE INCON ASSESS: CPT | Performed by: INTERNAL MEDICINE

## 2023-10-04 PROCEDURE — 1123F ACP DISCUSS/DSCN MKR DOCD: CPT | Performed by: INTERNAL MEDICINE

## 2023-10-04 PROCEDURE — 99213 OFFICE O/P EST LOW 20 MIN: CPT | Performed by: INTERNAL MEDICINE

## 2023-10-04 PROCEDURE — G8427 DOCREV CUR MEDS BY ELIG CLIN: HCPCS | Performed by: INTERNAL MEDICINE

## 2023-10-04 PROCEDURE — G8419 CALC BMI OUT NRM PARAM NOF/U: HCPCS | Performed by: INTERNAL MEDICINE

## 2023-10-04 PROCEDURE — G8399 PT W/DXA RESULTS DOCUMENT: HCPCS | Performed by: INTERNAL MEDICINE

## 2023-10-04 PROCEDURE — 1036F TOBACCO NON-USER: CPT | Performed by: INTERNAL MEDICINE

## 2023-10-04 RX ORDER — BACLOFEN 10 MG/1
5 TABLET ORAL NIGHTLY PRN
Qty: 15 TABLET | Refills: 0 | Status: SHIPPED | OUTPATIENT
Start: 2023-10-04

## 2023-10-04 RX ORDER — MELOXICAM 15 MG/1
15 TABLET ORAL DAILY PRN
Qty: 30 TABLET | Refills: 0 | Status: SHIPPED | OUTPATIENT
Start: 2023-10-04

## 2023-10-04 RX ORDER — MELOXICAM 15 MG/1
15 TABLET ORAL DAILY PRN
Qty: 30 TABLET | Refills: 0 | Status: SHIPPED | OUTPATIENT
Start: 2023-10-04 | End: 2023-10-04 | Stop reason: SDUPTHER

## 2023-10-04 RX ORDER — BACLOFEN 10 MG/1
5 TABLET ORAL NIGHTLY PRN
Qty: 15 TABLET | Refills: 0 | Status: SHIPPED | OUTPATIENT
Start: 2023-10-04 | End: 2023-10-04 | Stop reason: SDUPTHER

## 2023-10-04 NOTE — PROGRESS NOTES
understanding. An After Visit Summary was printed and given to the patient.     Savage De La Cruz MD

## 2023-10-16 ENCOUNTER — OFFICE VISIT (OUTPATIENT)
Facility: CLINIC | Age: 78
End: 2023-10-16
Payer: MEDICARE

## 2023-10-16 VITALS
WEIGHT: 167.2 LBS | BODY MASS INDEX: 28.54 KG/M2 | DIASTOLIC BLOOD PRESSURE: 78 MMHG | TEMPERATURE: 97.9 F | SYSTOLIC BLOOD PRESSURE: 118 MMHG | HEART RATE: 71 BPM | HEIGHT: 64 IN | RESPIRATION RATE: 14 BRPM | OXYGEN SATURATION: 96 %

## 2023-10-16 DIAGNOSIS — F51.01 PRIMARY INSOMNIA: ICD-10-CM

## 2023-10-16 DIAGNOSIS — K21.9 GASTROESOPHAGEAL REFLUX DISEASE WITHOUT ESOPHAGITIS: ICD-10-CM

## 2023-10-16 DIAGNOSIS — K58.0 IRRITABLE BOWEL SYNDROME WITH DIARRHEA: ICD-10-CM

## 2023-10-16 DIAGNOSIS — M85.89 OSTEOPENIA OF MULTIPLE SITES: ICD-10-CM

## 2023-10-16 DIAGNOSIS — R35.0 FREQUENCY OF MICTURITION: ICD-10-CM

## 2023-10-16 DIAGNOSIS — Z00.00 MEDICARE ANNUAL WELLNESS VISIT, SUBSEQUENT: ICD-10-CM

## 2023-10-16 DIAGNOSIS — E78.00 PURE HYPERCHOLESTEROLEMIA: ICD-10-CM

## 2023-10-16 DIAGNOSIS — R73.02 IGT (IMPAIRED GLUCOSE TOLERANCE): Primary | ICD-10-CM

## 2023-10-16 DIAGNOSIS — Z23 NEEDS FLU SHOT: ICD-10-CM

## 2023-10-16 DIAGNOSIS — Z79.899 ON STATIN THERAPY: ICD-10-CM

## 2023-10-16 DIAGNOSIS — E55.9 VITAMIN D DEFICIENCY: ICD-10-CM

## 2023-10-16 LAB
25(OH)D3 SERPL-MCNC: 30.3 NG/ML (ref 30–100)
ALBUMIN SERPL-MCNC: 4 G/DL (ref 3.5–5)
ALBUMIN/GLOB SERPL: 1.3 (ref 1.1–2.2)
ALP SERPL-CCNC: 101 U/L (ref 45–117)
ALT SERPL-CCNC: 21 U/L (ref 12–78)
ANION GAP SERPL CALC-SCNC: 4 MMOL/L (ref 5–15)
APPEARANCE UR: CLEAR
AST SERPL-CCNC: 18 U/L (ref 15–37)
BACTERIA URNS QL MICRO: NEGATIVE /HPF
BASOPHILS # BLD: 0.1 K/UL (ref 0–0.1)
BASOPHILS NFR BLD: 1 % (ref 0–1)
BILIRUB SERPL-MCNC: 0.4 MG/DL (ref 0.2–1)
BILIRUB UR QL: NEGATIVE
BUN SERPL-MCNC: 19 MG/DL (ref 6–20)
BUN/CREAT SERPL: 22 (ref 12–20)
CALCIUM SERPL-MCNC: 9.5 MG/DL (ref 8.5–10.1)
CHLORIDE SERPL-SCNC: 105 MMOL/L (ref 97–108)
CHOLEST SERPL-MCNC: 160 MG/DL
CK SERPL-CCNC: 105 U/L (ref 26–192)
CO2 SERPL-SCNC: 28 MMOL/L (ref 21–32)
COLOR UR: ABNORMAL
CREAT SERPL-MCNC: 0.86 MG/DL (ref 0.55–1.02)
DIFFERENTIAL METHOD BLD: ABNORMAL
EOSINOPHIL # BLD: 0.2 K/UL (ref 0–0.4)
EOSINOPHIL NFR BLD: 4 % (ref 0–7)
EPITH CASTS URNS QL MICRO: ABNORMAL /LPF
ERYTHROCYTE [DISTWIDTH] IN BLOOD BY AUTOMATED COUNT: 14.9 % (ref 11.5–14.5)
GLOBULIN SER CALC-MCNC: 3.1 G/DL (ref 2–4)
GLUCOSE SERPL-MCNC: 99 MG/DL (ref 65–100)
GLUCOSE UR STRIP.AUTO-MCNC: NEGATIVE MG/DL
HCT VFR BLD AUTO: 43.4 % (ref 35–47)
HDLC SERPL-MCNC: 89 MG/DL
HDLC SERPL: 1.8 (ref 0–5)
HGB BLD-MCNC: 13.9 G/DL (ref 11.5–16)
HGB UR QL STRIP: NEGATIVE
HYALINE CASTS URNS QL MICRO: ABNORMAL /LPF (ref 0–5)
IMM GRANULOCYTES # BLD AUTO: 0 K/UL (ref 0–0.04)
IMM GRANULOCYTES NFR BLD AUTO: 0 % (ref 0–0.5)
KETONES UR QL STRIP.AUTO: NEGATIVE MG/DL
LDLC SERPL CALC-MCNC: 58.4 MG/DL (ref 0–100)
LEUKOCYTE ESTERASE UR QL STRIP.AUTO: ABNORMAL
LYMPHOCYTES # BLD: 1.8 K/UL (ref 0.8–3.5)
LYMPHOCYTES NFR BLD: 34 % (ref 12–49)
MCH RBC QN AUTO: 30.2 PG (ref 26–34)
MCHC RBC AUTO-ENTMCNC: 32 G/DL (ref 30–36.5)
MCV RBC AUTO: 94.1 FL (ref 80–99)
MONOCYTES # BLD: 0.4 K/UL (ref 0–1)
MONOCYTES NFR BLD: 7 % (ref 5–13)
NEUTS SEG # BLD: 2.9 K/UL (ref 1.8–8)
NEUTS SEG NFR BLD: 54 % (ref 32–75)
NITRITE UR QL STRIP.AUTO: NEGATIVE
NRBC # BLD: 0 K/UL (ref 0–0.01)
NRBC BLD-RTO: 0 PER 100 WBC
PH UR STRIP: 6.5 (ref 5–8)
PLATELET # BLD AUTO: 251 K/UL (ref 150–400)
PMV BLD AUTO: 11.1 FL (ref 8.9–12.9)
POTASSIUM SERPL-SCNC: 4.8 MMOL/L (ref 3.5–5.1)
PROT SERPL-MCNC: 7.1 G/DL (ref 6.4–8.2)
PROT UR STRIP-MCNC: NEGATIVE MG/DL
RBC # BLD AUTO: 4.61 M/UL (ref 3.8–5.2)
RBC #/AREA URNS HPF: ABNORMAL /HPF (ref 0–5)
SODIUM SERPL-SCNC: 137 MMOL/L (ref 136–145)
SP GR UR REFRACTOMETRY: 1.01 (ref 1–1.03)
TRIGL SERPL-MCNC: 63 MG/DL
UROBILINOGEN UR QL STRIP.AUTO: 0.2 EU/DL (ref 0.2–1)
VLDLC SERPL CALC-MCNC: 12.6 MG/DL
WBC # BLD AUTO: 5.4 K/UL (ref 3.6–11)
WBC URNS QL MICRO: ABNORMAL /HPF (ref 0–4)

## 2023-10-16 PROCEDURE — G0008 ADMIN INFLUENZA VIRUS VAC: HCPCS | Performed by: INTERNAL MEDICINE

## 2023-10-16 PROCEDURE — G8484 FLU IMMUNIZE NO ADMIN: HCPCS | Performed by: INTERNAL MEDICINE

## 2023-10-16 PROCEDURE — 1123F ACP DISCUSS/DSCN MKR DOCD: CPT | Performed by: INTERNAL MEDICINE

## 2023-10-16 PROCEDURE — 90694 VACC AIIV4 NO PRSRV 0.5ML IM: CPT | Performed by: INTERNAL MEDICINE

## 2023-10-16 PROCEDURE — G0439 PPPS, SUBSEQ VISIT: HCPCS | Performed by: INTERNAL MEDICINE

## 2023-10-16 RX ORDER — TIZANIDINE 4 MG/1
4 TABLET ORAL 3 TIMES DAILY PRN
Qty: 90 TABLET | Refills: 0 | Status: SHIPPED | OUTPATIENT
Start: 2023-10-16

## 2023-10-16 RX ORDER — CEFUROXIME AXETIL 250 MG/1
TABLET ORAL
COMMUNITY

## 2023-10-16 ASSESSMENT — PATIENT HEALTH QUESTIONNAIRE - PHQ9
SUM OF ALL RESPONSES TO PHQ9 QUESTIONS 1 & 2: 0
SUM OF ALL RESPONSES TO PHQ QUESTIONS 1-9: 0
2. FEELING DOWN, DEPRESSED OR HOPELESS: 0
1. LITTLE INTEREST OR PLEASURE IN DOING THINGS: 0
SUM OF ALL RESPONSES TO PHQ QUESTIONS 1-9: 0

## 2023-10-16 ASSESSMENT — LIFESTYLE VARIABLES
HOW MANY STANDARD DRINKS CONTAINING ALCOHOL DO YOU HAVE ON A TYPICAL DAY: PATIENT DOES NOT DRINK
HOW OFTEN DO YOU HAVE A DRINK CONTAINING ALCOHOL: NEVER

## 2023-10-16 NOTE — PROGRESS NOTES
Renay Martins is a 77 y.o. female presenting for Medicare AWV and 6 Month Follow-Up  . 1. Have you been to the ER, urgent care clinic since your last visit? Hospitalized since your last visit? No    2. Have you seen or consulted any other health care providers outside of the [address] [place] [address] since your last visit? Include any pap smears or colon screening. No    After obtaining written consent and per orders of Dr. Jessica Mendenhall, injection of Fluad given by Adalberto Singh MA. Patient tolerated procedure well. VIS was given to them. No reactions noted.

## 2023-10-17 LAB
EST. AVERAGE GLUCOSE BLD GHB EST-MCNC: 111 MG/DL
HBA1C MFR BLD: 5.5 % (ref 4–5.6)

## 2023-10-26 DIAGNOSIS — M54.50 LUMBAR BACK PAIN: ICD-10-CM

## 2023-10-26 NOTE — TELEPHONE ENCOUNTER
Last Refill: 10-4-23  Last Visit: 10/16/2023   Next Visit: 4/16/2024     Requested Prescriptions     Pending Prescriptions Disp Refills    baclofen (LIORESAL) 10 MG tablet 45 tablet 0     Sig: Take 0.5 tablets by mouth nightly as needed (muscle spasms)

## 2023-10-27 RX ORDER — BACLOFEN 10 MG/1
5 TABLET ORAL NIGHTLY PRN
Qty: 45 TABLET | Refills: 0 | Status: SHIPPED | OUTPATIENT
Start: 2023-10-27

## 2024-03-04 RX ORDER — SIMVASTATIN 20 MG
20 TABLET ORAL NIGHTLY
Qty: 90 TABLET | Refills: 3 | Status: SHIPPED | OUTPATIENT
Start: 2024-03-04

## 2024-03-04 NOTE — TELEPHONE ENCOUNTER
Last Refill: 4-14-23  Last Visit: 10/16/2023   Next Visit: 4/16/2024     Requested Prescriptions     Pending Prescriptions Disp Refills    simvastatin (ZOCOR) 20 MG tablet [Pharmacy Med Name: SIMVASTATIN 20 MG Tablet] 90 tablet 3     Sig: TAKE 1 TABLET AT BEDTIME

## 2024-04-24 NOTE — TELEPHONE ENCOUNTER
PCP: MAXIM Garrett MD    Last appt: 10/16/2023    Future Appointments   Date Time Provider Department Center   5/2/2024 10:45 AM MAXIM Garrett MD [facility]       Requested Prescriptions     Pending Prescriptions Disp Refills    omeprazole (PRILOSEC) 20 MG delayed release capsule [Pharmacy Med Name: OMEPRAZOLE 20 MG Capsule Delayed Release] 90 capsule 1     Sig: TAKE 1 CAPSULE EVERY DAY

## 2024-04-25 RX ORDER — OMEPRAZOLE 20 MG/1
20 CAPSULE, DELAYED RELEASE ORAL DAILY
Qty: 90 CAPSULE | Refills: 1 | Status: SHIPPED | OUTPATIENT
Start: 2024-04-25

## 2024-05-02 ENCOUNTER — OFFICE VISIT (OUTPATIENT)
Facility: CLINIC | Age: 79
End: 2024-05-02
Payer: MEDICARE

## 2024-05-02 VITALS
SYSTOLIC BLOOD PRESSURE: 140 MMHG | HEIGHT: 64 IN | BODY MASS INDEX: 28.27 KG/M2 | TEMPERATURE: 98.2 F | DIASTOLIC BLOOD PRESSURE: 72 MMHG | WEIGHT: 165.6 LBS | OXYGEN SATURATION: 97 % | HEART RATE: 76 BPM | RESPIRATION RATE: 14 BRPM

## 2024-05-02 DIAGNOSIS — M85.89 OSTEOPENIA OF MULTIPLE SITES: ICD-10-CM

## 2024-05-02 DIAGNOSIS — R53.83 OTHER FATIGUE: ICD-10-CM

## 2024-05-02 DIAGNOSIS — E78.00 PURE HYPERCHOLESTEROLEMIA: ICD-10-CM

## 2024-05-02 DIAGNOSIS — E55.9 VITAMIN D DEFICIENCY: ICD-10-CM

## 2024-05-02 DIAGNOSIS — R73.02 IGT (IMPAIRED GLUCOSE TOLERANCE): Primary | ICD-10-CM

## 2024-05-02 DIAGNOSIS — Z79.899 ON STATIN THERAPY: ICD-10-CM

## 2024-05-02 DIAGNOSIS — K21.9 GASTROESOPHAGEAL REFLUX DISEASE WITHOUT ESOPHAGITIS: ICD-10-CM

## 2024-05-02 DIAGNOSIS — R35.0 FREQUENCY OF MICTURITION: ICD-10-CM

## 2024-05-02 PROCEDURE — G8427 DOCREV CUR MEDS BY ELIG CLIN: HCPCS | Performed by: INTERNAL MEDICINE

## 2024-05-02 PROCEDURE — G8399 PT W/DXA RESULTS DOCUMENT: HCPCS | Performed by: INTERNAL MEDICINE

## 2024-05-02 PROCEDURE — 99214 OFFICE O/P EST MOD 30 MIN: CPT | Performed by: INTERNAL MEDICINE

## 2024-05-02 PROCEDURE — 1123F ACP DISCUSS/DSCN MKR DOCD: CPT | Performed by: INTERNAL MEDICINE

## 2024-05-02 PROCEDURE — G8419 CALC BMI OUT NRM PARAM NOF/U: HCPCS | Performed by: INTERNAL MEDICINE

## 2024-05-02 PROCEDURE — 1036F TOBACCO NON-USER: CPT | Performed by: INTERNAL MEDICINE

## 2024-05-02 PROCEDURE — 1090F PRES/ABSN URINE INCON ASSESS: CPT | Performed by: INTERNAL MEDICINE

## 2024-05-02 SDOH — ECONOMIC STABILITY: HOUSING INSECURITY
IN THE LAST 12 MONTHS, WAS THERE A TIME WHEN YOU DID NOT HAVE A STEADY PLACE TO SLEEP OR SLEPT IN A SHELTER (INCLUDING NOW)?: NO

## 2024-05-02 SDOH — ECONOMIC STABILITY: FOOD INSECURITY: WITHIN THE PAST 12 MONTHS, THE FOOD YOU BOUGHT JUST DIDN'T LAST AND YOU DIDN'T HAVE MONEY TO GET MORE.: NEVER TRUE

## 2024-05-02 SDOH — ECONOMIC STABILITY: FOOD INSECURITY: WITHIN THE PAST 12 MONTHS, YOU WORRIED THAT YOUR FOOD WOULD RUN OUT BEFORE YOU GOT MONEY TO BUY MORE.: NEVER TRUE

## 2024-05-02 SDOH — ECONOMIC STABILITY: INCOME INSECURITY: HOW HARD IS IT FOR YOU TO PAY FOR THE VERY BASICS LIKE FOOD, HOUSING, MEDICAL CARE, AND HEATING?: NOT HARD AT ALL

## 2024-05-02 ASSESSMENT — PATIENT HEALTH QUESTIONNAIRE - PHQ9
SUM OF ALL RESPONSES TO PHQ QUESTIONS 1-9: 0
SUM OF ALL RESPONSES TO PHQ QUESTIONS 1-9: 0
1. LITTLE INTEREST OR PLEASURE IN DOING THINGS: NOT AT ALL
SUM OF ALL RESPONSES TO PHQ QUESTIONS 1-9: 0
SUM OF ALL RESPONSES TO PHQ9 QUESTIONS 1 & 2: 0
2. FEELING DOWN, DEPRESSED OR HOPELESS: NOT AT ALL
SUM OF ALL RESPONSES TO PHQ QUESTIONS 1-9: 0

## 2024-05-02 NOTE — PROGRESS NOTES
Pauly Hernadez is a 78 y.o. female     Chief Complaint   Patient presents with    6 Month Follow-Up       BP (!) 140/72 (Site: Left Upper Arm, Position: Sitting, Cuff Size: Medium Adult)   Pulse 76   Temp 98.2 °F (36.8 °C) (Oral)   Resp 14   Ht 1.626 m (5' 4\")   Wt 75.1 kg (165 lb 9.6 oz)   SpO2 97%   BMI 28.43 kg/m²     Health Maintenance Due   Topic Date Due    Respiratory Syncytial Virus (RSV) Pregnant or age 60 yrs+ (1 - 1-dose 60+ series) Never done    COVID-19 Vaccine (3 - 2023-24 season) 09/01/2023         \"Have you been to the ER, urgent care clinic since your last visit?  Hospitalized since your last visit?\"    NO  NO  “Have you seen or consulted any other health care providers outside of [facility] since your last visit?”    NO

## 2024-05-05 LAB
25(OH)D3 SERPL-MCNC: 26.9 NG/ML (ref 30–100)
ALBUMIN SERPL-MCNC: 4.1 G/DL (ref 3.5–5)
ALBUMIN/GLOB SERPL: 1.5 (ref 1.1–2.2)
ALP SERPL-CCNC: 107 U/L (ref 45–117)
ALT SERPL-CCNC: 19 U/L (ref 12–78)
ANION GAP SERPL CALC-SCNC: 4 MMOL/L (ref 5–15)
APPEARANCE UR: CLEAR
AST SERPL-CCNC: 22 U/L (ref 15–37)
BACTERIA URNS QL MICRO: NEGATIVE /HPF
BASOPHILS # BLD: 0.1 K/UL (ref 0–0.1)
BASOPHILS NFR BLD: 1 % (ref 0–1)
BILIRUB SERPL-MCNC: 0.5 MG/DL (ref 0.2–1)
BILIRUB UR QL: NEGATIVE
BUN SERPL-MCNC: 21 MG/DL (ref 6–20)
BUN/CREAT SERPL: 21 (ref 12–20)
CALCIUM SERPL-MCNC: 9.3 MG/DL (ref 8.5–10.1)
CHLORIDE SERPL-SCNC: 104 MMOL/L (ref 97–108)
CHOLEST SERPL-MCNC: 161 MG/DL
CK SERPL-CCNC: 79 U/L (ref 26–192)
CO2 SERPL-SCNC: 29 MMOL/L (ref 21–32)
COLOR UR: ABNORMAL
CREAT SERPL-MCNC: 0.99 MG/DL (ref 0.55–1.02)
DIFFERENTIAL METHOD BLD: NORMAL
EOSINOPHIL # BLD: 0.2 K/UL (ref 0–0.4)
EOSINOPHIL NFR BLD: 3 % (ref 0–7)
EPITH CASTS URNS QL MICRO: ABNORMAL /LPF
ERYTHROCYTE [DISTWIDTH] IN BLOOD BY AUTOMATED COUNT: 14.1 % (ref 11.5–14.5)
EST. AVERAGE GLUCOSE BLD GHB EST-MCNC: 111 MG/DL
GLOBULIN SER CALC-MCNC: 2.8 G/DL (ref 2–4)
GLUCOSE SERPL-MCNC: 95 MG/DL (ref 65–100)
GLUCOSE UR STRIP.AUTO-MCNC: NEGATIVE MG/DL
HBA1C MFR BLD: 5.5 % (ref 4–5.6)
HCT VFR BLD AUTO: 44 % (ref 35–47)
HDLC SERPL-MCNC: 81 MG/DL
HDLC SERPL: 2 (ref 0–5)
HGB BLD-MCNC: 13.9 G/DL (ref 11.5–16)
HGB UR QL STRIP: NEGATIVE
HYALINE CASTS URNS QL MICRO: ABNORMAL /LPF (ref 0–5)
IMM GRANULOCYTES # BLD AUTO: 0 K/UL (ref 0–0.04)
IMM GRANULOCYTES NFR BLD AUTO: 0 % (ref 0–0.5)
KETONES UR QL STRIP.AUTO: NEGATIVE MG/DL
LDLC SERPL CALC-MCNC: 69.4 MG/DL (ref 0–100)
LEUKOCYTE ESTERASE UR QL STRIP.AUTO: ABNORMAL
LYMPHOCYTES # BLD: 2.1 K/UL (ref 0.8–3.5)
LYMPHOCYTES NFR BLD: 36 % (ref 12–49)
MCH RBC QN AUTO: 30.2 PG (ref 26–34)
MCHC RBC AUTO-ENTMCNC: 31.6 G/DL (ref 30–36.5)
MCV RBC AUTO: 95.4 FL (ref 80–99)
MONOCYTES # BLD: 0.5 K/UL (ref 0–1)
MONOCYTES NFR BLD: 9 % (ref 5–13)
NEUTS SEG # BLD: 3 K/UL (ref 1.8–8)
NEUTS SEG NFR BLD: 51 % (ref 32–75)
NITRITE UR QL STRIP.AUTO: NEGATIVE
NRBC # BLD: 0 K/UL (ref 0–0.01)
NRBC BLD-RTO: 0 PER 100 WBC
PH UR STRIP: 5.5 (ref 5–8)
PLATELET # BLD AUTO: 248 K/UL (ref 150–400)
PMV BLD AUTO: 11.3 FL (ref 8.9–12.9)
POTASSIUM SERPL-SCNC: 4.3 MMOL/L (ref 3.5–5.1)
PROT SERPL-MCNC: 6.9 G/DL (ref 6.4–8.2)
PROT UR STRIP-MCNC: NEGATIVE MG/DL
RBC # BLD AUTO: 4.61 M/UL (ref 3.8–5.2)
RBC #/AREA URNS HPF: ABNORMAL /HPF (ref 0–5)
SODIUM SERPL-SCNC: 137 MMOL/L (ref 136–145)
SP GR UR REFRACTOMETRY: 1.01 (ref 1–1.03)
TRIGL SERPL-MCNC: 53 MG/DL
UROBILINOGEN UR QL STRIP.AUTO: 0.2 EU/DL (ref 0.2–1)
VLDLC SERPL CALC-MCNC: 10.6 MG/DL
WBC # BLD AUTO: 5.8 K/UL (ref 3.6–11)
WBC URNS QL MICRO: ABNORMAL /HPF (ref 0–4)

## 2024-05-31 ENCOUNTER — TELEPHONE (OUTPATIENT)
Facility: CLINIC | Age: 79
End: 2024-05-31

## 2024-06-03 NOTE — PROGRESS NOTES
Pauly Hernadez is a 79 y.o. female and presents with 6 Month Follow-Up  .    Subjective:  Pauly presents today for 6-month follow-up for several problems including GERD, impaired glucose tolerance, osteopenia, vitamin D deficiency, hyperlipidemia, monitoring statin therapy.  She is doing well on her current medical regimen.  She has no shortness of breath, chest pain, palpitations, PND, orthopnea, or pedal edema.  She remains on omeprazole 20 mg daily.  She remains on simvastatin 20 mg nightly with good results.    Past Medical History:   Diagnosis Date    Arthritis     hands    Cancer (HCC)     basal cell of face, squamous, and melanoma    Colitis 04/21/2018    [facility] ER     Colitis, ischemic (HCC) 9/26/2017    Duodenal ulcer, perforated ([facility]) 9/26/2017    Dyspepsia and other specified disorders of function of stomach     GERD (gastroesophageal reflux disease)     History of rectal bleeding 04/2018    Two episodes; first one 19 years ago - labeled as a \"possible ischemic attack\"    Hypercholesterolemia     Hyperlipidemia 9/26/2017    IBS (irritable bowel syndrome) 9/26/2017    IGT (impaired glucose tolerance) 09/26/2017    Borderline Hgb A1C    Insomnia 9/26/2017    Melanoma (HCC) 9/26/2017    On statin therapy 9/26/2017    Osteopenia 9/26/2017    Pneumonia of right lung due to infectious organism 9/6/2019    PUD (peptic ulcer disease)     Temporal arteritis ([facility]) 9/26/2017    Vitamin D deficiency 9/26/2017    Zoster 9/26/2017     Past Surgical History:   Procedure Laterality Date    COLONOSCOPY N/A 8/8/2018    COLONOSCOPY performed by Agustin Scott Jr., MD at [facility] ENDOSCOPY    COLONOSCOPY      COLONOSCOPY,BIOPSY  3/17/2015         COLONOSCOPY,DIAGNOSTIC  8/8/2018         GYN  1984    hysterectomy    GYN  1980's    laparoscopy    [place] UNLISTED PROCEDURE ABDOMEN PERITONEUM & OMENTUM  1985    perforated ulcer    TONSILLECTOMY      UPPER GASTROINTESTINAL ENDOSCOPY      UPPER GI ENDOSCOPY,BIOPSY

## 2024-07-16 ENCOUNTER — LAB (OUTPATIENT)
Facility: CLINIC | Age: 79
End: 2024-07-16

## 2024-07-16 DIAGNOSIS — R39.9 UTI SYMPTOMS: Primary | ICD-10-CM

## 2024-07-16 RX ORDER — CEFUROXIME AXETIL 250 MG/1
250 TABLET ORAL 2 TIMES DAILY
Qty: 20 TABLET | Refills: 0 | Status: SHIPPED | OUTPATIENT
Start: 2024-07-16 | End: 2024-07-26

## 2024-07-17 LAB
APPEARANCE UR: CLEAR
BACTERIA URNS QL MICRO: NEGATIVE /HPF
BILIRUB UR QL: NEGATIVE
COLOR UR: ABNORMAL
EPITH CASTS URNS QL MICRO: ABNORMAL /LPF
GLUCOSE UR STRIP.AUTO-MCNC: NEGATIVE MG/DL
HGB UR QL STRIP: NEGATIVE
HYALINE CASTS URNS QL MICRO: ABNORMAL /LPF (ref 0–5)
KETONES UR QL STRIP.AUTO: NEGATIVE MG/DL
LEUKOCYTE ESTERASE UR QL STRIP.AUTO: ABNORMAL
NITRITE UR QL STRIP.AUTO: NEGATIVE
PH UR STRIP: 5 (ref 5–8)
PROT UR STRIP-MCNC: NEGATIVE MG/DL
RBC #/AREA URNS HPF: ABNORMAL /HPF (ref 0–5)
SP GR UR REFRACTOMETRY: 1.01 (ref 1–1.03)
UROBILINOGEN UR QL STRIP.AUTO: 0.2 EU/DL (ref 0.2–1)
WBC URNS QL MICRO: ABNORMAL /HPF (ref 0–4)

## 2024-07-18 LAB
BACTERIA SPEC CULT: NORMAL
SERVICE CMNT-IMP: NORMAL

## 2024-07-18 RX ORDER — ESTRADIOL 0.1 MG/G
CREAM VAGINAL
Qty: 42.5 G | Refills: 1 | Status: CANCELLED | OUTPATIENT
Start: 2024-07-18

## 2024-07-18 NOTE — TELEPHONE ENCOUNTER
PCP: MAXIM Garrett MD    Last appt: 5/2/2024    Future Appointments   Date Time Provider Department Center   11/7/2024 10:45 AM MAXIM Garrett MD [facility]       Requested Prescriptions     Pending Prescriptions Disp Refills    estradiol (ESTRACE) 0.1 MG/GM vaginal cream 42.5 g 1     Sig: PLEASE SEE ATTACHED FOR DETAILED DIRECTIONS

## 2024-10-03 NOTE — TELEPHONE ENCOUNTER
Patient advised to apply a warm compress and take tylenol as directed if no better Monday please call to inform
Patient states she just just got a vaccine and now its red rash on her arm 3 inching in diameter     [phone]
Admission

## 2024-11-07 ENCOUNTER — OFFICE VISIT (OUTPATIENT)
Facility: CLINIC | Age: 79
End: 2024-11-07

## 2024-11-07 VITALS
BODY MASS INDEX: 28.61 KG/M2 | DIASTOLIC BLOOD PRESSURE: 79 MMHG | WEIGHT: 167.6 LBS | OXYGEN SATURATION: 100 % | SYSTOLIC BLOOD PRESSURE: 122 MMHG | HEIGHT: 64 IN | HEART RATE: 61 BPM | TEMPERATURE: 97.7 F | RESPIRATION RATE: 18 BRPM

## 2024-11-07 DIAGNOSIS — R73.02 IGT (IMPAIRED GLUCOSE TOLERANCE): Primary | ICD-10-CM

## 2024-11-07 DIAGNOSIS — E78.00 PURE HYPERCHOLESTEROLEMIA: ICD-10-CM

## 2024-11-07 DIAGNOSIS — Z79.899 ON STATIN THERAPY: ICD-10-CM

## 2024-11-07 DIAGNOSIS — M85.89 OSTEOPENIA OF MULTIPLE SITES: ICD-10-CM

## 2024-11-07 DIAGNOSIS — E55.9 VITAMIN D DEFICIENCY: ICD-10-CM

## 2024-11-07 DIAGNOSIS — Z00.00 MEDICARE ANNUAL WELLNESS VISIT, SUBSEQUENT: ICD-10-CM

## 2024-11-07 DIAGNOSIS — R35.0 FREQUENCY OF MICTURITION: ICD-10-CM

## 2024-11-07 DIAGNOSIS — K58.0 IRRITABLE BOWEL SYNDROME WITH DIARRHEA: ICD-10-CM

## 2024-11-07 DIAGNOSIS — K21.9 GASTROESOPHAGEAL REFLUX DISEASE WITHOUT ESOPHAGITIS: ICD-10-CM

## 2024-11-07 DIAGNOSIS — F51.01 PRIMARY INSOMNIA: ICD-10-CM

## 2024-11-07 DIAGNOSIS — R53.83 OTHER FATIGUE: ICD-10-CM

## 2024-11-07 DIAGNOSIS — H60.11 CELLULITIS OF RIGHT EAR: ICD-10-CM

## 2024-11-07 DIAGNOSIS — C43.9 MALIGNANT MELANOMA OF SKIN, UNSPECIFIED (HCC): ICD-10-CM

## 2024-11-07 RX ORDER — MUPIROCIN 20 MG/G
OINTMENT TOPICAL
Qty: 15 G | Refills: 0 | Status: SHIPPED | OUTPATIENT
Start: 2024-11-07 | End: 2024-11-14

## 2024-11-07 ASSESSMENT — PATIENT HEALTH QUESTIONNAIRE - PHQ9
1. LITTLE INTEREST OR PLEASURE IN DOING THINGS: NOT AT ALL
SUM OF ALL RESPONSES TO PHQ QUESTIONS 1-9: 0
SUM OF ALL RESPONSES TO PHQ9 QUESTIONS 1 & 2: 0
SUM OF ALL RESPONSES TO PHQ QUESTIONS 1-9: 0
2. FEELING DOWN, DEPRESSED OR HOPELESS: NOT AT ALL
SUM OF ALL RESPONSES TO PHQ QUESTIONS 1-9: 0
2. FEELING DOWN, DEPRESSED OR HOPELESS: NOT AT ALL
SUM OF ALL RESPONSES TO PHQ9 QUESTIONS 1 & 2: 0
SUM OF ALL RESPONSES TO PHQ QUESTIONS 1-9: 0
SUM OF ALL RESPONSES TO PHQ QUESTIONS 1-9: 0
1. LITTLE INTEREST OR PLEASURE IN DOING THINGS: NOT AT ALL
SUM OF ALL RESPONSES TO PHQ QUESTIONS 1-9: 0

## 2024-11-07 NOTE — PROGRESS NOTES
Pauly Hernadez is a 79 y.o. female     Chief Complaint   Patient presents with    Medicare AWV     Pt would like flu shot    R ear soreness    /79 (Site: Left Upper Arm, Position: Sitting, Cuff Size: Medium Adult)   Pulse 61   Temp 97.7 °F (36.5 °C) (Temporal)   Resp 18   Ht 1.626 m (5' 4\")   Wt 76 kg (167 lb 9.6 oz)   SpO2 100%   BMI 28.77 kg/m²     Health Maintenance Due   Topic Date Due    Respiratory Syncytial Virus (RSV) Pregnant or age 60 yrs+ (1 - 1-dose 60+ series) Never done    Flu vaccine (1) 08/01/2024    COVID-19 Vaccine (3 - 2023-24 season) 09/01/2024    Annual Wellness Visit (Medicare)  10/16/2024         \"Have you been to the ER, urgent care clinic since your last visit?  Hospitalized since your last visit?\"    NO    “Have you seen or consulted any other health care providers outside of [facility] since your [facility] visit?”    NO                   
Unknown (comments)  \"funny feeling in my mouth...years ago\"     Prior to Visit Medications    Medication Sig Taking? Authorizing Provider   ASPIRIN 81 PO Take 81 mg by mouth daily Yes Provider, MD Luci   omeprazole (PRILOSEC) 20 MG delayed release capsule TAKE 1 CAPSULE EVERY DAY Yes MAXIM Garrett MD   simvastatin (ZOCOR) 20 MG tablet TAKE 1 TABLET AT BEDTIME Yes MAXIM Garrett MD   tiZANidine (ZANAFLEX) 4 MG tablet Take 1 tablet by mouth 3 times daily as needed (muscle spasm) Yes MAXIM Garrett MD   ascorbic acid (VITAMIN C) 500 MG tablet Take by mouth Yes Automatic Reconciliation, Ar   Biotin 2.5 MG CAPS Take 5,000 mg by mouth daily Yes Automatic Reconciliation, Ar   Cetirizine HCl (ZYRTEC ALLERGY) 10 MG CAPS Take by mouth Yes Automatic Reconciliation, Ar   vitamin D3 (CHOLECALCIFEROL) 125 MCG (5000 UT) TABS tablet Take 1 tablet by mouth daily Yes Automatic Reconciliation, Ar   dicyclomine (BENTYL) 10 MG capsule Take 1 capsule by mouth 4 times daily as needed Yes Automatic Reconciliation, Ar   latanoprost (XALATAN) 0.005 % ophthalmic solution ceived the following from [facility]: Outside name: latanoprost (XALATAN) 0.005 % ophthalmic solution Yes Automatic Reconciliation, Ar   melatonin 5 MG TABS tablet Take 2 tablets by mouth Yes Automatic Reconciliation, Ar   baclofen (LIORESAL) 10 MG tablet Take 0.5 tablets by mouth nightly as needed (muscle spasms)  Patient not taking: Reported on 11/7/2024  MAXIM Garrett MD       [facility] (Including outside providers/suppliers regularly involved in providing care):   Patient Care Team:  MAXIM Garrett MD as PCP - General  MAXIM Garrett MD as PCP - Empaneled Provider  Iehsa Michael MD as Physician      Reviewed and updated this visit:  Allergies  Meds

## 2024-11-07 NOTE — PATIENT INSTRUCTIONS
Learning About Being Active as an Older Adult  Why is being active important as you get older?     Being active is one of the best things you can do for your health. And it's never too late to start. Being active--or getting active, if you aren't already--has definite benefits. It can:  Give you more energy,  Keep your mind sharp.  Improve balance to reduce your risk of falls.  Help you manage chronic illness with fewer medicines.  No matter how old you are, how fit you are, or what health problems you have, there is a form of activity that will work for you. And the more physical activity you can do, the better your overall health will be.  What kinds of activity can help you stay healthy?  Being more active will make your daily activities easier. Physical activity includes planned exercise and things you do in daily life. There are four types of activity:  Aerobic.  Doing aerobic activity makes your heart and lungs strong.  Includes walking, dancing, and gardening.  Aim for at least 2½ hours spread throughout the week.  It improves your energy and can help you sleep better.  Muscle-strengthening.  This type of activity can help maintain muscle and strengthen bones.  Includes climbing stairs, using resistance bands, and lifting or carrying heavy loads.  Aim for at least twice a week.  It can help protect the knees and other joints.  Stretching.  Stretching gives you better range of motion in joints and muscles.  Includes upper arm stretches, calf stretches, and gentle yoga.  Aim for at least twice a week, preferably after your muscles are warmed up from other activities.  It can help you function better in daily life.  Balancing.  This helps you stay coordinated and have good posture.  Includes heel-to-toe walking, mike chi, and certain types of yoga.  Aim for at least 3 days a week.  It can reduce your risk of falling.  Even if you have a hard time meeting the recommendations, it's better to be more active

## 2024-11-08 LAB
25(OH)D3 SERPL-MCNC: 30.9 NG/ML (ref 30–100)
ALBUMIN SERPL-MCNC: 4.2 G/DL (ref 3.5–5)
ALBUMIN/GLOB SERPL: 1.4 (ref 1.1–2.2)
ALP SERPL-CCNC: 118 U/L (ref 45–117)
ALT SERPL-CCNC: 22 U/L (ref 12–78)
ANION GAP SERPL CALC-SCNC: 3 MMOL/L (ref 2–12)
APPEARANCE UR: CLEAR
AST SERPL-CCNC: 20 U/L (ref 15–37)
BACTERIA URNS QL MICRO: NEGATIVE /HPF
BASOPHILS # BLD: 0.1 K/UL (ref 0–0.1)
BASOPHILS NFR BLD: 1 % (ref 0–1)
BILIRUB SERPL-MCNC: 0.4 MG/DL (ref 0.2–1)
BILIRUB UR QL: NEGATIVE
BUN SERPL-MCNC: 16 MG/DL (ref 6–20)
BUN/CREAT SERPL: 18 (ref 12–20)
CALCIUM SERPL-MCNC: 9.7 MG/DL (ref 8.5–10.1)
CHLORIDE SERPL-SCNC: 105 MMOL/L (ref 97–108)
CHOLEST SERPL-MCNC: 182 MG/DL
CK SERPL-CCNC: 61 U/L (ref 26–192)
CO2 SERPL-SCNC: 30 MMOL/L (ref 21–32)
COLOR UR: ABNORMAL
CREAT SERPL-MCNC: 0.91 MG/DL (ref 0.55–1.02)
DIFFERENTIAL METHOD BLD: NORMAL
EOSINOPHIL # BLD: 0.1 K/UL (ref 0–0.4)
EOSINOPHIL NFR BLD: 2 % (ref 0–7)
EPITH CASTS URNS QL MICRO: ABNORMAL /LPF
ERYTHROCYTE [DISTWIDTH] IN BLOOD BY AUTOMATED COUNT: 14.3 % (ref 11.5–14.5)
EST. AVERAGE GLUCOSE BLD GHB EST-MCNC: 117 MG/DL
GLOBULIN SER CALC-MCNC: 3.1 G/DL (ref 2–4)
GLUCOSE SERPL-MCNC: 96 MG/DL (ref 65–100)
GLUCOSE UR STRIP.AUTO-MCNC: NEGATIVE MG/DL
HBA1C MFR BLD: 5.7 % (ref 4–5.6)
HCT VFR BLD AUTO: 43.9 % (ref 35–47)
HDLC SERPL-MCNC: 82 MG/DL
HDLC SERPL: 2.2 (ref 0–5)
HGB BLD-MCNC: 14.1 G/DL (ref 11.5–16)
HGB UR QL STRIP: NEGATIVE
HYALINE CASTS URNS QL MICRO: ABNORMAL /LPF (ref 0–5)
IMM GRANULOCYTES # BLD AUTO: 0 K/UL (ref 0–0.04)
IMM GRANULOCYTES NFR BLD AUTO: 0 % (ref 0–0.5)
KETONES UR QL STRIP.AUTO: NEGATIVE MG/DL
LDLC SERPL CALC-MCNC: 80.6 MG/DL (ref 0–100)
LEUKOCYTE ESTERASE UR QL STRIP.AUTO: ABNORMAL
LYMPHOCYTES # BLD: 1.9 K/UL (ref 0.8–3.5)
LYMPHOCYTES NFR BLD: 29 % (ref 12–49)
MCH RBC QN AUTO: 29.9 PG (ref 26–34)
MCHC RBC AUTO-ENTMCNC: 32.1 G/DL (ref 30–36.5)
MCV RBC AUTO: 93.2 FL (ref 80–99)
MONOCYTES # BLD: 0.5 K/UL (ref 0–1)
MONOCYTES NFR BLD: 8 % (ref 5–13)
NEUTS SEG # BLD: 3.9 K/UL (ref 1.8–8)
NEUTS SEG NFR BLD: 60 % (ref 32–75)
NITRITE UR QL STRIP.AUTO: NEGATIVE
NRBC # BLD: 0 K/UL (ref 0–0.01)
NRBC BLD-RTO: 0 PER 100 WBC
PH UR STRIP: 5.5 (ref 5–8)
PLATELET # BLD AUTO: 268 K/UL (ref 150–400)
PMV BLD AUTO: 10.9 FL (ref 8.9–12.9)
POTASSIUM SERPL-SCNC: 4.3 MMOL/L (ref 3.5–5.1)
PROT SERPL-MCNC: 7.3 G/DL (ref 6.4–8.2)
PROT UR STRIP-MCNC: NEGATIVE MG/DL
RBC # BLD AUTO: 4.71 M/UL (ref 3.8–5.2)
RBC #/AREA URNS HPF: ABNORMAL /HPF (ref 0–5)
SODIUM SERPL-SCNC: 138 MMOL/L (ref 136–145)
SP GR UR REFRACTOMETRY: 1.01 (ref 1–1.03)
TRIGL SERPL-MCNC: 97 MG/DL
UROBILINOGEN UR QL STRIP.AUTO: 0.2 EU/DL (ref 0.2–1)
VLDLC SERPL CALC-MCNC: 19.4 MG/DL
WBC # BLD AUTO: 6.5 K/UL (ref 3.6–11)
WBC URNS QL MICRO: ABNORMAL /HPF (ref 0–4)

## 2024-11-18 ENCOUNTER — TELEPHONE (OUTPATIENT)
Facility: CLINIC | Age: 79
End: 2024-11-18

## 2024-11-27 NOTE — TELEPHONE ENCOUNTER
Requesting for back spasms. Patient is leaving for [place] on Monday.     PCP: MAXIM Garrett MD    Last appt: 11/7/2024  Future Appointments   Date Time Provider Department Center   5/7/2025 10:15 AM MAXIM Garrett MD [facility]       Requested Prescriptions     Pending Prescriptions Disp Refills    tiZANidine (ZANAFLEX) 4 MG tablet 90 tablet 0     Sig: Take 1 tablet by mouth 3 times daily as needed (muscle spasm)       Prior labs and Blood pressures:  BP Readings from Last 3 Encounters:   11/07/24 122/79   05/02/24 (!) 140/72   10/16/23 118/78     Lab Results   Component Value Date/Time     11/07/2024 12:09 PM    K 4.3 11/07/2024 12:09 PM     11/07/2024 12:09 PM    CO2 30 11/07/2024 12:09 PM    BUN 16 11/07/2024 12:09 PM    GFRAA >60 05/02/2022 09:09 AM     No results found for: \"HBA1C\", \"TNL5CRPH\"  Lab Results   Component Value Date/Time    CHOL 182 11/07/2024 12:09 PM    HDL 82 11/07/2024 12:09 PM    LDL 80.6 11/07/2024 12:09 PM    LDL 58.4 10/16/2023 10:58 AM    VLDL 19.4 11/07/2024 12:09 PM    VLDL 20 10/14/2020 02:45 PM     No results found for: \"VITD3\"        No results found for: \"TSH\", \"TSH2\", \"TSH3\"

## 2024-12-10 NOTE — TELEPHONE ENCOUNTER
PCP: MAXIM Garrett MD    Last appt: 11/7/2024    Future Appointments   Date Time Provider Department Center   5/7/2025 10:15 AM MAXIM Garrett MD [facility] DE[facility]       Requested Prescriptions     Pending Prescriptions Disp Refills    omeprazole (PRILOSEC) 20 MG delayed release capsule [Pharmacy Med Name: Omeprazole Oral Capsule Delayed Release 20 MG] 90 capsule 3     Sig: TAKE 1 CAPSULE EVERY DAY

## 2025-01-22 ENCOUNTER — TELEPHONE (OUTPATIENT)
Facility: CLINIC | Age: 80
End: 2025-01-22

## 2025-01-28 ENCOUNTER — TELEPHONE (OUTPATIENT)
Facility: CLINIC | Age: 80
End: 2025-01-28

## 2025-01-28 NOTE — TELEPHONE ENCOUNTER
Patient would like for her November labs to be mailed to her. She is in [place] until April 1. Please mail to her address in [place]- [facility] [place] [address] [address]

## 2025-01-28 NOTE — TELEPHONE ENCOUNTER
Called and left detailed message for pt that Dr. Garrett has not yet resulted her labs from November and once he does, she will receive a call and labs in the mail per her request.

## 2025-02-10 RX ORDER — SIMVASTATIN 20 MG
20 TABLET ORAL NIGHTLY
Qty: 90 TABLET | Refills: 3 | Status: SHIPPED | OUTPATIENT
Start: 2025-02-10

## 2025-02-10 NOTE — TELEPHONE ENCOUNTER
PCP: MAXIM Garrett MD    Last appt: 11/7/2024    Future Appointments   Date Time Provider Department Center   5/7/2025 10:15 AM MAXIM Garrett MD [facility] DEP       Requested Prescriptions     Pending Prescriptions Disp Refills    simvastatin (ZOCOR) 20 MG tablet [Pharmacy Med Name: Simvastatin Oral Tablet 20 MG] 90 tablet 3     Sig: TAKE 1 TABLET AT BEDTIME

## 2025-05-06 NOTE — TELEPHONE ENCOUNTER
PCP: MAXIM Garrett MD    Last appt: 11/7/2024    Future Appointments   Date Time Provider Department Center   5/22/2025  1:45 PM MAXIM Grarett MD [facility]       Requested Prescriptions     Pending Prescriptions Disp Refills    tiZANidine (ZANAFLEX) 4 MG tablet 90 tablet 0     Sig: Take 1 tablet by mouth 3 times daily as needed (muscle spasm)

## 2025-05-22 ENCOUNTER — OFFICE VISIT (OUTPATIENT)
Facility: CLINIC | Age: 80
End: 2025-05-22

## 2025-05-22 VITALS
OXYGEN SATURATION: 98 % | RESPIRATION RATE: 18 BRPM | TEMPERATURE: 98.7 F | DIASTOLIC BLOOD PRESSURE: 68 MMHG | SYSTOLIC BLOOD PRESSURE: 130 MMHG | BODY MASS INDEX: 28.07 KG/M2 | WEIGHT: 164.4 LBS | HEIGHT: 64 IN | HEART RATE: 60 BPM

## 2025-05-22 DIAGNOSIS — F51.01 PRIMARY INSOMNIA: ICD-10-CM

## 2025-05-22 DIAGNOSIS — R35.0 FREQUENCY OF MICTURITION: ICD-10-CM

## 2025-05-22 DIAGNOSIS — E55.9 VITAMIN D DEFICIENCY: ICD-10-CM

## 2025-05-22 DIAGNOSIS — E78.00 PURE HYPERCHOLESTEROLEMIA: ICD-10-CM

## 2025-05-22 DIAGNOSIS — R53.83 OTHER FATIGUE: ICD-10-CM

## 2025-05-22 DIAGNOSIS — K21.9 GASTROESOPHAGEAL REFLUX DISEASE WITHOUT ESOPHAGITIS: ICD-10-CM

## 2025-05-22 DIAGNOSIS — C43.9 MALIGNANT MELANOMA OF SKIN, UNSPECIFIED (HCC): ICD-10-CM

## 2025-05-22 DIAGNOSIS — K58.0 IRRITABLE BOWEL SYNDROME WITH DIARRHEA: ICD-10-CM

## 2025-05-22 DIAGNOSIS — R73.02 IGT (IMPAIRED GLUCOSE TOLERANCE): Primary | ICD-10-CM

## 2025-05-22 DIAGNOSIS — M85.89 OSTEOPENIA OF MULTIPLE SITES: ICD-10-CM

## 2025-05-22 DIAGNOSIS — Z79.899 ON STATIN THERAPY: ICD-10-CM

## 2025-05-22 RX ORDER — DICYCLOMINE HYDROCHLORIDE 10 MG/1
10 CAPSULE ORAL
Qty: 120 CAPSULE | Refills: 0 | Status: SHIPPED | OUTPATIENT
Start: 2025-05-22

## 2025-05-22 SDOH — ECONOMIC STABILITY: FOOD INSECURITY: WITHIN THE PAST 12 MONTHS, YOU WORRIED THAT YOUR FOOD WOULD RUN OUT BEFORE YOU GOT MONEY TO BUY MORE.: NEVER TRUE

## 2025-05-22 SDOH — ECONOMIC STABILITY: FOOD INSECURITY: WITHIN THE PAST 12 MONTHS, THE FOOD YOU BOUGHT JUST DIDN'T LAST AND YOU DIDN'T HAVE MONEY TO GET MORE.: NEVER TRUE

## 2025-05-22 ASSESSMENT — PATIENT HEALTH QUESTIONNAIRE - PHQ9
SUM OF ALL RESPONSES TO PHQ QUESTIONS 1-9: 0
1. LITTLE INTEREST OR PLEASURE IN DOING THINGS: NOT AT ALL
SUM OF ALL RESPONSES TO PHQ QUESTIONS 1-9: 0
2. FEELING DOWN, DEPRESSED OR HOPELESS: NOT AT ALL

## 2025-05-22 NOTE — PROGRESS NOTES
Pauly Hernadez is a 79 y.o. female     Chief Complaint   Patient presents with    6 Month Follow-Up       /68 (BP Site: Left Upper Arm, Patient Position: Sitting, BP Cuff Size: Large Adult)   Pulse 60   Temp 98.7 °F (37.1 °C) (Temporal)   Resp 18   Ht 1.626 m (5' 4\")   Wt 74.6 kg (164 lb 6.4 oz)   SpO2 98%   BMI 28.22 kg/m²     Health Maintenance Due   Topic Date Due    Respiratory Syncytial Virus (RSV) Pregnant or age 60 yrs+ (1 - 1-dose 75+ series) Never done    COVID-19 Vaccine (3 - 2024-25 season) 09/01/2024    DTaP/Tdap/Td vaccine (2 - Td or Tdap) 01/22/2025         \"Have you been to the ER, urgent care clinic since your last visit?  Hospitalized since your last visit?\"    [facility].      “Have you seen or consulted any other health care providers outside of [facility] since your last visit?”    NO

## 2025-05-22 NOTE — PROGRESS NOTES
Pauly Hernadez is a 79 y.o. female and presents with 6 Month Follow-Up  .    Subjective:  Pauly presents today for 6-month follow-up for several problems including GERD, IBS, impaired glucose tolerance, osteopenia, insomnia, vitamin D deficiency, hyperlipidemia, and monitoring statin therapy.  She has been doing well overall.  She has no shortness of breath, chest pain, palpitations, PND, orthopnea, or pedal edema.  She has a rash that is developed in her right groin area and has a topical antifungal cream for this which she has just started using.  She had a bone density test done 4 years ago which showed some mild osteopenia which we reviewed briefly today.  She will be due for follow-up bone density next year.    Past Medical History:   Diagnosis Date    Arthritis     hands    Cancer (HCC)     basal cell of face, squamous, and melanoma    Colitis 04/21/2018    [facility] ER     Colitis, ischemic 9/26/2017    Duodenal ulcer, perforated (HCC) 9/26/2017    Dyspepsia and other specified disorders of function of stomach     GERD (gastroesophageal reflux disease)     History of rectal bleeding 04/2018    Two episodes; first one 19 years ago - labeled as a \"possible ischemic attack\"    Hypercholesterolemia     Hyperlipidemia 9/26/2017    IBS (irritable bowel syndrome) 9/26/2017    IGT (impaired glucose tolerance) 09/26/2017    Borderline Hgb A1C    Insomnia 9/26/2017    Melanoma (HCC) 9/26/2017    On statin therapy 9/26/2017    Osteopenia 9/26/2017    Pneumonia of right lung due to infectious organism 9/6/2019    PUD (peptic ulcer disease)     Temporal arteritis ([facility]) 9/26/2017    Vitamin D deficiency 9/26/2017    Zoster 9/26/2017     Past Surgical History:   Procedure Laterality Date    COLONOSCOPY N/A 8/8/2018    COLONOSCOPY performed by Agustin Scott Jr., MD at hospitals ENDOSCOPY    COLONOSCOPY      COLONOSCOPY,BIOPSY  3/17/2015         COLONOSCOPY,DIAGNOSTIC  8/8/2018         GYN  [id number]

## 2025-05-23 LAB
25(OH)D3 SERPL-MCNC: 25.6 NG/ML (ref 30–100)
ALBUMIN SERPL-MCNC: 4.1 G/DL (ref 3.5–5)
ALBUMIN/GLOB SERPL: 1.3 (ref 1.1–2.2)
ALP SERPL-CCNC: 125 U/L (ref 45–117)
ALT SERPL-CCNC: 22 U/L (ref 12–78)
ANION GAP SERPL CALC-SCNC: 6 MMOL/L (ref 2–12)
APPEARANCE UR: CLEAR
AST SERPL-CCNC: 21 U/L (ref 15–37)
BACTERIA URNS QL MICRO: NEGATIVE /HPF
BASOPHILS # BLD: 0.05 K/UL (ref 0–0.1)
BASOPHILS NFR BLD: 0.8 % (ref 0–1)
BILIRUB SERPL-MCNC: 0.7 MG/DL (ref 0.2–1)
BILIRUB UR QL: NEGATIVE
BUN SERPL-MCNC: 20 MG/DL (ref 6–20)
BUN/CREAT SERPL: 23 (ref 12–20)
CALCIUM SERPL-MCNC: 9.3 MG/DL (ref 8.5–10.1)
CHLORIDE SERPL-SCNC: 101 MMOL/L (ref 97–108)
CHOLEST SERPL-MCNC: 196 MG/DL
CK SERPL-CCNC: 61 U/L (ref 26–192)
CO2 SERPL-SCNC: 29 MMOL/L (ref 21–32)
COLOR UR: ABNORMAL
CREAT SERPL-MCNC: 0.87 MG/DL (ref 0.55–1.02)
DIFFERENTIAL METHOD BLD: NORMAL
EOSINOPHIL # BLD: 0.17 K/UL (ref 0–0.4)
EOSINOPHIL NFR BLD: 2.8 % (ref 0–7)
EPITH CASTS URNS QL MICRO: ABNORMAL /LPF
ERYTHROCYTE [DISTWIDTH] IN BLOOD BY AUTOMATED COUNT: 13.9 % (ref 11.5–14.5)
EST. AVERAGE GLUCOSE BLD GHB EST-MCNC: 120 MG/DL
GLOBULIN SER CALC-MCNC: 3.2 G/DL (ref 2–4)
GLUCOSE SERPL-MCNC: 97 MG/DL (ref 65–100)
GLUCOSE UR STRIP.AUTO-MCNC: NEGATIVE MG/DL
HBA1C MFR BLD: 5.8 % (ref 4–5.6)
HCT VFR BLD AUTO: 44.5 % (ref 35–47)
HDLC SERPL-MCNC: 72 MG/DL
HDLC SERPL: 2.7 (ref 0–5)
HGB BLD-MCNC: 14.7 G/DL (ref 11.5–16)
HGB UR QL STRIP: NEGATIVE
HYALINE CASTS URNS QL MICRO: ABNORMAL /LPF (ref 0–5)
IMM GRANULOCYTES # BLD AUTO: 0.01 K/UL (ref 0–0.04)
IMM GRANULOCYTES NFR BLD AUTO: 0.2 % (ref 0–0.5)
KETONES UR QL STRIP.AUTO: NEGATIVE MG/DL
LDLC SERPL CALC-MCNC: 102.6 MG/DL (ref 0–100)
LEUKOCYTE ESTERASE UR QL STRIP.AUTO: ABNORMAL
LYMPHOCYTES # BLD: 2.03 K/UL (ref 0.8–3.5)
LYMPHOCYTES NFR BLD: 33 % (ref 12–49)
MCH RBC QN AUTO: 30.4 PG (ref 26–34)
MCHC RBC AUTO-ENTMCNC: 33 G/DL (ref 30–36.5)
MCV RBC AUTO: 91.9 FL (ref 80–99)
MONOCYTES # BLD: 0.52 K/UL (ref 0–1)
MONOCYTES NFR BLD: 8.4 % (ref 5–13)
NEUTS SEG # BLD: 3.38 K/UL (ref 1.8–8)
NEUTS SEG NFR BLD: 54.8 % (ref 32–75)
NITRITE UR QL STRIP.AUTO: NEGATIVE
NRBC # BLD: 0 K/UL (ref 0–0.01)
NRBC BLD-RTO: 0 PER 100 WBC
PH UR STRIP: 5.5 (ref 5–8)
PLATELET # BLD AUTO: 285 K/UL (ref 150–400)
PMV BLD AUTO: 11.2 FL (ref 8.9–12.9)
POTASSIUM SERPL-SCNC: 4.8 MMOL/L (ref 3.5–5.1)
PROT SERPL-MCNC: 7.3 G/DL (ref 6.4–8.2)
PROT UR STRIP-MCNC: NEGATIVE MG/DL
RBC # BLD AUTO: 4.84 M/UL (ref 3.8–5.2)
RBC #/AREA URNS HPF: ABNORMAL /HPF (ref 0–5)
SODIUM SERPL-SCNC: 136 MMOL/L (ref 136–145)
SP GR UR REFRACTOMETRY: 1.01 (ref 1–1.03)
TRIGL SERPL-MCNC: 107 MG/DL
UROBILINOGEN UR QL STRIP.AUTO: 0.2 EU/DL (ref 0.2–1)
VLDLC SERPL CALC-MCNC: 21.4 MG/DL
WBC # BLD AUTO: 6.2 K/UL (ref 3.6–11)
WBC URNS QL MICRO: ABNORMAL /HPF (ref 0–4)

## 2025-05-27 ENCOUNTER — RESULTS FOLLOW-UP (OUTPATIENT)
Facility: CLINIC | Age: 80
End: 2025-05-27

## 2025-05-27 NOTE — TELEPHONE ENCOUNTER
Your vitamin D level remains just below normal range.  Urine analysis is clear.  Your cholesterol profile is excellent.  Your A1c which is an average of your glucose is just above normal range of 5.8%.  Your glucose is stable at 97.  Your complete blood count is normal.  Recommend continue over-the-counter vitamin D3 5000 units daily with vitamin K2.

## (undated) DEVICE — BLOCK BITE ENDOSCP AD 21 MM W/ DIL BLU LF DISP

## (undated) DEVICE — BASIN EMSIS 16OZ GRAPHITE PLAS KID SHP MOLD GRAD FOR ORAL

## (undated) DEVICE — MEDI-VAC YANK SUCT HNDL W/TPRD BULBOUS TIP: Brand: CARDINAL HEALTH

## (undated) DEVICE — NEEDLE HYPO 18GA L1.5IN PNK S STL HUB POLYPR SHLD REG BVL

## (undated) DEVICE — Z DISCONTINUED PER MEDLINE LINE GAS SAMPLING O2/CO2 LNG AD 13 FT NSL W/ TBNG FILTERLINE

## (undated) DEVICE — SOLIDIFIER MEDC 1200ML -- CONVERT TO 356117

## (undated) DEVICE — 1200 GUARD II KIT W/5MM TUBE W/O VAC TUBE: Brand: GUARDIAN

## (undated) DEVICE — SYR 3ML LL TIP 1/10ML GRAD --

## (undated) DEVICE — FORCEPS BX L160CM DIA8MM GRSP DISECT CUP TIP NONLOCKING ROT

## (undated) DEVICE — SYR 10ML LUER LOK 1/5ML GRAD --

## (undated) DEVICE — BAG SPEC BIOHZRD 10 X 10 IN --

## (undated) DEVICE — Device

## (undated) DEVICE — SET ADMIN 16ML TBNG L100IN 2 Y INJ SITE IV PIGGY BK DISP

## (undated) DEVICE — CONTAINER SPEC 20 ML LID NEUT BUFF FORMALIN 10 % POLYPR STS

## (undated) DEVICE — CATH IV AUTOGRD BC BLU 22GA 25 -- INSYTE

## (undated) DEVICE — KENDALL RADIOLUCENT FOAM MONITORING ELECTRODE RECTANGULAR SHAPE: Brand: KENDALL

## (undated) DEVICE — SYRINGE 50ML E/T

## (undated) DEVICE — NEONATAL-ADULT SPO2 SENSOR: Brand: NELLCOR

## (undated) DEVICE — TOWEL 4 PLY TISS 19X30 SUE WHT